# Patient Record
Sex: MALE | Race: WHITE | Employment: OTHER | URBAN - METROPOLITAN AREA
[De-identification: names, ages, dates, MRNs, and addresses within clinical notes are randomized per-mention and may not be internally consistent; named-entity substitution may affect disease eponyms.]

---

## 2019-09-03 ENCOUNTER — HOSPITAL ENCOUNTER (INPATIENT)
Age: 79
LOS: 3 days | Discharge: HOME | DRG: 292 | End: 2019-09-06
Attending: EMERGENCY MEDICINE | Admitting: INTERNAL MEDICINE
Payer: MEDICARE

## 2019-09-03 ENCOUNTER — APPOINTMENT (OUTPATIENT)
Dept: GENERAL RADIOLOGY | Age: 79
DRG: 292 | End: 2019-09-03
Payer: MEDICARE

## 2019-09-03 DIAGNOSIS — I50.9 ACUTE ON CHRONIC CONGESTIVE HEART FAILURE, UNSPECIFIED HEART FAILURE TYPE (HCC): Primary | ICD-10-CM

## 2019-09-03 DIAGNOSIS — E83.42 HYPOMAGNESEMIA: ICD-10-CM

## 2019-09-03 DIAGNOSIS — E87.6 HYPOKALEMIA: ICD-10-CM

## 2019-09-03 LAB
ALBUMIN SERPL-MCNC: 4.1 G/DL (ref 3.5–5.2)
ALP BLD-CCNC: 67 U/L (ref 40–129)
ALT SERPL-CCNC: 12 U/L (ref 0–40)
ANION GAP SERPL CALCULATED.3IONS-SCNC: 10 MMOL/L (ref 7–16)
AST SERPL-CCNC: 19 U/L (ref 0–39)
BASOPHILS ABSOLUTE: 0.01 E9/L (ref 0–0.2)
BASOPHILS RELATIVE PERCENT: 0.2 % (ref 0–2)
BILIRUB SERPL-MCNC: 0.9 MG/DL (ref 0–1.2)
BUN BLDV-MCNC: 11 MG/DL (ref 8–23)
CALCIUM SERPL-MCNC: 9 MG/DL (ref 8.6–10.2)
CHLORIDE BLD-SCNC: 104 MMOL/L (ref 98–107)
CO2: 33 MMOL/L (ref 22–29)
CREAT SERPL-MCNC: 1.1 MG/DL (ref 0.7–1.2)
EOSINOPHILS ABSOLUTE: 0.14 E9/L (ref 0.05–0.5)
EOSINOPHILS RELATIVE PERCENT: 2.9 % (ref 0–6)
GFR AFRICAN AMERICAN: >60
GFR NON-AFRICAN AMERICAN: >60 ML/MIN/1.73
GLUCOSE BLD-MCNC: 106 MG/DL (ref 74–99)
HCT VFR BLD CALC: 43 % (ref 37–54)
HEMOGLOBIN: 14.3 G/DL (ref 12.5–16.5)
IMMATURE GRANULOCYTES #: 0.01 E9/L
IMMATURE GRANULOCYTES %: 0.2 % (ref 0–5)
LACTIC ACID: 1 MMOL/L (ref 0.5–2.2)
LYMPHOCYTES ABSOLUTE: 0.95 E9/L (ref 1.5–4)
LYMPHOCYTES RELATIVE PERCENT: 19.4 % (ref 20–42)
MAGNESIUM: 1.5 MG/DL (ref 1.6–2.6)
MCH RBC QN AUTO: 33.1 PG (ref 26–35)
MCHC RBC AUTO-ENTMCNC: 33.3 % (ref 32–34.5)
MCV RBC AUTO: 99.5 FL (ref 80–99.9)
MONOCYTES ABSOLUTE: 0.47 E9/L (ref 0.1–0.95)
MONOCYTES RELATIVE PERCENT: 9.6 % (ref 2–12)
NEUTROPHILS ABSOLUTE: 3.32 E9/L (ref 1.8–7.3)
NEUTROPHILS RELATIVE PERCENT: 67.7 % (ref 43–80)
PDW BLD-RTO: 13.3 FL (ref 11.5–15)
PLATELET # BLD: 152 E9/L (ref 130–450)
PMV BLD AUTO: 11.4 FL (ref 7–12)
POTASSIUM SERPL-SCNC: 3.4 MMOL/L (ref 3.5–5)
PRO-BNP: 3344 PG/ML (ref 0–450)
RBC # BLD: 4.32 E12/L (ref 3.8–5.8)
SODIUM BLD-SCNC: 147 MMOL/L (ref 132–146)
TOTAL PROTEIN: 6.8 G/DL (ref 6.4–8.3)
TROPONIN: <0.01 NG/ML (ref 0–0.03)
WBC # BLD: 4.9 E9/L (ref 4.5–11.5)

## 2019-09-03 PROCEDURE — 2500000003 HC RX 250 WO HCPCS: Performed by: INTERNAL MEDICINE

## 2019-09-03 PROCEDURE — 80053 COMPREHEN METABOLIC PANEL: CPT

## 2019-09-03 PROCEDURE — 6370000000 HC RX 637 (ALT 250 FOR IP): Performed by: INTERNAL MEDICINE

## 2019-09-03 PROCEDURE — 85025 COMPLETE CBC W/AUTO DIFF WBC: CPT

## 2019-09-03 PROCEDURE — 6370000000 HC RX 637 (ALT 250 FOR IP): Performed by: EMERGENCY MEDICINE

## 2019-09-03 PROCEDURE — 6360000002 HC RX W HCPCS: Performed by: EMERGENCY MEDICINE

## 2019-09-03 PROCEDURE — 2580000003 HC RX 258: Performed by: INTERNAL MEDICINE

## 2019-09-03 PROCEDURE — 83605 ASSAY OF LACTIC ACID: CPT

## 2019-09-03 PROCEDURE — 84484 ASSAY OF TROPONIN QUANT: CPT

## 2019-09-03 PROCEDURE — 83735 ASSAY OF MAGNESIUM: CPT

## 2019-09-03 PROCEDURE — 93005 ELECTROCARDIOGRAM TRACING: CPT | Performed by: NURSE PRACTITIONER

## 2019-09-03 PROCEDURE — 36415 COLL VENOUS BLD VENIPUNCTURE: CPT

## 2019-09-03 PROCEDURE — 83880 ASSAY OF NATRIURETIC PEPTIDE: CPT

## 2019-09-03 PROCEDURE — 99285 EMERGENCY DEPT VISIT HI MDM: CPT

## 2019-09-03 PROCEDURE — 2060000000 HC ICU INTERMEDIATE R&B

## 2019-09-03 PROCEDURE — 71046 X-RAY EXAM CHEST 2 VIEWS: CPT

## 2019-09-03 PROCEDURE — 99223 1ST HOSP IP/OBS HIGH 75: CPT | Performed by: INTERNAL MEDICINE

## 2019-09-03 RX ORDER — SODIUM CHLORIDE 0.9 % (FLUSH) 0.9 %
10 SYRINGE (ML) INJECTION EVERY 12 HOURS SCHEDULED
Status: DISCONTINUED | OUTPATIENT
Start: 2019-09-03 | End: 2019-09-06 | Stop reason: HOSPADM

## 2019-09-03 RX ORDER — METOPROLOL SUCCINATE 100 MG/1
100 TABLET, EXTENDED RELEASE ORAL 2 TIMES DAILY
Status: DISCONTINUED | OUTPATIENT
Start: 2019-09-04 | End: 2019-09-06 | Stop reason: HOSPADM

## 2019-09-03 RX ORDER — LISINOPRIL 5 MG/1
5 TABLET ORAL DAILY
Status: DISCONTINUED | OUTPATIENT
Start: 2019-09-04 | End: 2019-09-04

## 2019-09-03 RX ORDER — DABIGATRAN ETEXILATE 75 MG/1
150 CAPSULE, COATED PELLETS ORAL 2 TIMES DAILY
Status: DISCONTINUED | OUTPATIENT
Start: 2019-09-03 | End: 2019-09-06 | Stop reason: HOSPADM

## 2019-09-03 RX ORDER — MAGNESIUM SULFATE IN WATER 40 MG/ML
2 INJECTION, SOLUTION INTRAVENOUS ONCE
Status: COMPLETED | OUTPATIENT
Start: 2019-09-03 | End: 2019-09-03

## 2019-09-03 RX ORDER — POTASSIUM CHLORIDE 20 MEQ/1
40 TABLET, EXTENDED RELEASE ORAL ONCE
Status: COMPLETED | OUTPATIENT
Start: 2019-09-03 | End: 2019-09-03

## 2019-09-03 RX ORDER — ONDANSETRON 2 MG/ML
4 INJECTION INTRAMUSCULAR; INTRAVENOUS EVERY 6 HOURS PRN
Status: DISCONTINUED | OUTPATIENT
Start: 2019-09-03 | End: 2019-09-06 | Stop reason: HOSPADM

## 2019-09-03 RX ORDER — SPIRONOLACTONE 25 MG/1
25 TABLET ORAL DAILY
Status: DISCONTINUED | OUTPATIENT
Start: 2019-09-04 | End: 2019-09-06 | Stop reason: HOSPADM

## 2019-09-03 RX ORDER — ACETAMINOPHEN 325 MG/1
650 TABLET ORAL EVERY 4 HOURS PRN
Status: DISCONTINUED | OUTPATIENT
Start: 2019-09-03 | End: 2019-09-06 | Stop reason: HOSPADM

## 2019-09-03 RX ORDER — SODIUM CHLORIDE 0.9 % (FLUSH) 0.9 %
10 SYRINGE (ML) INJECTION PRN
Status: DISCONTINUED | OUTPATIENT
Start: 2019-09-03 | End: 2019-09-06 | Stop reason: HOSPADM

## 2019-09-03 RX ORDER — LANOLIN ALCOHOL/MO/W.PET/CERES
1000 CREAM (GRAM) TOPICAL DAILY
Status: ON HOLD | COMMUNITY
End: 2022-10-14 | Stop reason: HOSPADM

## 2019-09-03 RX ORDER — FUROSEMIDE 10 MG/ML
20 INJECTION INTRAMUSCULAR; INTRAVENOUS 2 TIMES DAILY
Status: DISCONTINUED | OUTPATIENT
Start: 2019-09-04 | End: 2019-09-04

## 2019-09-03 RX ADMIN — POTASSIUM CHLORIDE 40 MEQ: 20 TABLET, EXTENDED RELEASE ORAL at 17:12

## 2019-09-03 RX ADMIN — Medication 10 ML: at 22:22

## 2019-09-03 RX ADMIN — DABIGATRAN ETEXILATE MESYLATE 150 MG: 75 CAPSULE ORAL at 22:51

## 2019-09-03 RX ADMIN — FAMOTIDINE 20 MG: 10 INJECTION, SOLUTION INTRAVENOUS at 22:50

## 2019-09-03 RX ADMIN — MAGNESIUM SULFATE HEPTAHYDRATE 2 G: 40 INJECTION, SOLUTION INTRAVENOUS at 19:28

## 2019-09-03 ASSESSMENT — PAIN SCALES - GENERAL: PAINLEVEL_OUTOF10: 0

## 2019-09-04 LAB
ANION GAP SERPL CALCULATED.3IONS-SCNC: 12 MMOL/L (ref 7–16)
BASOPHILS ABSOLUTE: 0.02 E9/L (ref 0–0.2)
BASOPHILS RELATIVE PERCENT: 0.3 % (ref 0–2)
BUN BLDV-MCNC: 12 MG/DL (ref 8–23)
CALCIUM SERPL-MCNC: 9.2 MG/DL (ref 8.6–10.2)
CHLORIDE BLD-SCNC: 103 MMOL/L (ref 98–107)
CHOLESTEROL, TOTAL: 147 MG/DL (ref 0–199)
CO2: 27 MMOL/L (ref 22–29)
CREAT SERPL-MCNC: 1 MG/DL (ref 0.7–1.2)
EKG ATRIAL RATE: 42 BPM
EKG Q-T INTERVAL: 476 MS
EKG QRS DURATION: 200 MS
EKG QTC CALCULATION (BAZETT): 559 MS
EKG R AXIS: -77 DEGREES
EKG T AXIS: 85 DEGREES
EKG VENTRICULAR RATE: 83 BPM
EOSINOPHILS ABSOLUTE: 0.21 E9/L (ref 0.05–0.5)
EOSINOPHILS RELATIVE PERCENT: 3.6 % (ref 0–6)
GFR AFRICAN AMERICAN: >60
GFR NON-AFRICAN AMERICAN: >60 ML/MIN/1.73
GLUCOSE BLD-MCNC: 121 MG/DL (ref 74–99)
HCT VFR BLD CALC: 46.4 % (ref 37–54)
HDLC SERPL-MCNC: 64 MG/DL
HEMOGLOBIN: 15.4 G/DL (ref 12.5–16.5)
IMMATURE GRANULOCYTES #: 0.01 E9/L
IMMATURE GRANULOCYTES %: 0.2 % (ref 0–5)
LDL CHOLESTEROL CALCULATED: 59 MG/DL (ref 0–99)
LV EF: 35 %
LVEF MODALITY: NORMAL
LYMPHOCYTES ABSOLUTE: 1.06 E9/L (ref 1.5–4)
LYMPHOCYTES RELATIVE PERCENT: 18.3 % (ref 20–42)
MAGNESIUM: 2 MG/DL (ref 1.6–2.6)
MCH RBC QN AUTO: 33.5 PG (ref 26–35)
MCHC RBC AUTO-ENTMCNC: 33.2 % (ref 32–34.5)
MCV RBC AUTO: 100.9 FL (ref 80–99.9)
MONOCYTES ABSOLUTE: 0.48 E9/L (ref 0.1–0.95)
MONOCYTES RELATIVE PERCENT: 8.3 % (ref 2–12)
NEUTROPHILS ABSOLUTE: 4.01 E9/L (ref 1.8–7.3)
NEUTROPHILS RELATIVE PERCENT: 69.3 % (ref 43–80)
PDW BLD-RTO: 13.5 FL (ref 11.5–15)
PLATELET # BLD: 147 E9/L (ref 130–450)
PMV BLD AUTO: 11 FL (ref 7–12)
POTASSIUM SERPL-SCNC: 3.6 MMOL/L (ref 3.5–5)
RBC # BLD: 4.6 E12/L (ref 3.8–5.8)
SODIUM BLD-SCNC: 142 MMOL/L (ref 132–146)
TRIGL SERPL-MCNC: 119 MG/DL (ref 0–149)
VLDLC SERPL CALC-MCNC: 24 MG/DL
WBC # BLD: 5.8 E9/L (ref 4.5–11.5)

## 2019-09-04 PROCEDURE — 36415 COLL VENOUS BLD VENIPUNCTURE: CPT

## 2019-09-04 PROCEDURE — 6370000000 HC RX 637 (ALT 250 FOR IP): Performed by: INTERNAL MEDICINE

## 2019-09-04 PROCEDURE — 93010 ELECTROCARDIOGRAM REPORT: CPT | Performed by: INTERNAL MEDICINE

## 2019-09-04 PROCEDURE — 2500000003 HC RX 250 WO HCPCS: Performed by: INTERNAL MEDICINE

## 2019-09-04 PROCEDURE — 6360000002 HC RX W HCPCS: Performed by: INTERNAL MEDICINE

## 2019-09-04 PROCEDURE — 2060000000 HC ICU INTERMEDIATE R&B

## 2019-09-04 PROCEDURE — 83735 ASSAY OF MAGNESIUM: CPT

## 2019-09-04 PROCEDURE — 80048 BASIC METABOLIC PNL TOTAL CA: CPT

## 2019-09-04 PROCEDURE — 85025 COMPLETE CBC W/AUTO DIFF WBC: CPT

## 2019-09-04 PROCEDURE — 80061 LIPID PANEL: CPT

## 2019-09-04 PROCEDURE — 99233 SBSQ HOSP IP/OBS HIGH 50: CPT | Performed by: INTERNAL MEDICINE

## 2019-09-04 PROCEDURE — 2580000003 HC RX 258: Performed by: INTERNAL MEDICINE

## 2019-09-04 PROCEDURE — 93306 TTE W/DOPPLER COMPLETE: CPT

## 2019-09-04 RX ORDER — LISINOPRIL 10 MG/1
10 TABLET ORAL DAILY
Status: DISCONTINUED | OUTPATIENT
Start: 2019-09-04 | End: 2019-09-05

## 2019-09-04 RX ORDER — FUROSEMIDE 10 MG/ML
40 INJECTION INTRAMUSCULAR; INTRAVENOUS 2 TIMES DAILY
Status: COMPLETED | OUTPATIENT
Start: 2019-09-04 | End: 2019-09-05

## 2019-09-04 RX ADMIN — DABIGATRAN ETEXILATE MESYLATE 150 MG: 75 CAPSULE ORAL at 09:12

## 2019-09-04 RX ADMIN — METOPROLOL SUCCINATE 100 MG: 100 TABLET, EXTENDED RELEASE ORAL at 16:35

## 2019-09-04 RX ADMIN — Medication 10 ML: at 21:28

## 2019-09-04 RX ADMIN — FAMOTIDINE 20 MG: 10 INJECTION, SOLUTION INTRAVENOUS at 09:20

## 2019-09-04 RX ADMIN — LISINOPRIL 10 MG: 10 TABLET ORAL at 09:11

## 2019-09-04 RX ADMIN — SPIRONOLACTONE 25 MG: 25 TABLET ORAL at 09:11

## 2019-09-04 RX ADMIN — DABIGATRAN ETEXILATE MESYLATE 150 MG: 75 CAPSULE ORAL at 21:28

## 2019-09-04 RX ADMIN — Medication 10 ML: at 09:13

## 2019-09-04 RX ADMIN — METOPROLOL SUCCINATE 100 MG: 100 TABLET, EXTENDED RELEASE ORAL at 09:11

## 2019-09-04 RX ADMIN — FUROSEMIDE 40 MG: 10 INJECTION, SOLUTION INTRAMUSCULAR; INTRAVENOUS at 09:12

## 2019-09-04 RX ADMIN — FUROSEMIDE 40 MG: 10 INJECTION, SOLUTION INTRAMUSCULAR; INTRAVENOUS at 18:11

## 2019-09-04 RX ADMIN — FAMOTIDINE 20 MG: 10 INJECTION, SOLUTION INTRAVENOUS at 21:28

## 2019-09-04 ASSESSMENT — PAIN SCALES - GENERAL: PAINLEVEL_OUTOF10: 0

## 2019-09-05 LAB
ANION GAP SERPL CALCULATED.3IONS-SCNC: 12 MMOL/L (ref 7–16)
BASOPHILS ABSOLUTE: 0.02 E9/L (ref 0–0.2)
BASOPHILS RELATIVE PERCENT: 0.3 % (ref 0–2)
BUN BLDV-MCNC: 15 MG/DL (ref 8–23)
CALCIUM SERPL-MCNC: 9.4 MG/DL (ref 8.6–10.2)
CHLORIDE BLD-SCNC: 103 MMOL/L (ref 98–107)
CO2: 30 MMOL/L (ref 22–29)
CREAT SERPL-MCNC: 1.2 MG/DL (ref 0.7–1.2)
EOSINOPHILS ABSOLUTE: 0.22 E9/L (ref 0.05–0.5)
EOSINOPHILS RELATIVE PERCENT: 3.5 % (ref 0–6)
GFR AFRICAN AMERICAN: >60
GFR NON-AFRICAN AMERICAN: 58 ML/MIN/1.73
GLUCOSE BLD-MCNC: 108 MG/DL (ref 74–99)
HCT VFR BLD CALC: 43.7 % (ref 37–54)
HEMOGLOBIN: 14.7 G/DL (ref 12.5–16.5)
IMMATURE GRANULOCYTES #: 0.02 E9/L
IMMATURE GRANULOCYTES %: 0.3 % (ref 0–5)
LYMPHOCYTES ABSOLUTE: 1.38 E9/L (ref 1.5–4)
LYMPHOCYTES RELATIVE PERCENT: 22.2 % (ref 20–42)
MAGNESIUM: 1.8 MG/DL (ref 1.6–2.6)
MCH RBC QN AUTO: 33.3 PG (ref 26–35)
MCHC RBC AUTO-ENTMCNC: 33.6 % (ref 32–34.5)
MCV RBC AUTO: 98.9 FL (ref 80–99.9)
MONOCYTES ABSOLUTE: 0.69 E9/L (ref 0.1–0.95)
MONOCYTES RELATIVE PERCENT: 11.1 % (ref 2–12)
NEUTROPHILS ABSOLUTE: 3.9 E9/L (ref 1.8–7.3)
NEUTROPHILS RELATIVE PERCENT: 62.6 % (ref 43–80)
PDW BLD-RTO: 13.3 FL (ref 11.5–15)
PLATELET # BLD: 147 E9/L (ref 130–450)
PMV BLD AUTO: 11 FL (ref 7–12)
POTASSIUM SERPL-SCNC: 3.5 MMOL/L (ref 3.5–5)
PRO-BNP: 2618 PG/ML (ref 0–450)
RBC # BLD: 4.42 E12/L (ref 3.8–5.8)
SODIUM BLD-SCNC: 145 MMOL/L (ref 132–146)
WBC # BLD: 6.2 E9/L (ref 4.5–11.5)

## 2019-09-05 PROCEDURE — 6370000000 HC RX 637 (ALT 250 FOR IP): Performed by: INTERNAL MEDICINE

## 2019-09-05 PROCEDURE — 80048 BASIC METABOLIC PNL TOTAL CA: CPT

## 2019-09-05 PROCEDURE — 36415 COLL VENOUS BLD VENIPUNCTURE: CPT

## 2019-09-05 PROCEDURE — 2580000003 HC RX 258: Performed by: INTERNAL MEDICINE

## 2019-09-05 PROCEDURE — 83735 ASSAY OF MAGNESIUM: CPT

## 2019-09-05 PROCEDURE — 99232 SBSQ HOSP IP/OBS MODERATE 35: CPT | Performed by: INTERNAL MEDICINE

## 2019-09-05 PROCEDURE — 6360000002 HC RX W HCPCS: Performed by: INTERNAL MEDICINE

## 2019-09-05 PROCEDURE — 2500000003 HC RX 250 WO HCPCS: Performed by: INTERNAL MEDICINE

## 2019-09-05 PROCEDURE — 85025 COMPLETE CBC W/AUTO DIFF WBC: CPT

## 2019-09-05 PROCEDURE — 2060000000 HC ICU INTERMEDIATE R&B

## 2019-09-05 PROCEDURE — 83880 ASSAY OF NATRIURETIC PEPTIDE: CPT

## 2019-09-05 PROCEDURE — 2580000003 HC RX 258: Performed by: EMERGENCY MEDICINE

## 2019-09-05 RX ORDER — LISINOPRIL 20 MG/1
20 TABLET ORAL DAILY
Status: DISCONTINUED | OUTPATIENT
Start: 2019-09-05 | End: 2019-09-06 | Stop reason: HOSPADM

## 2019-09-05 RX ORDER — POTASSIUM CHLORIDE 20 MEQ/1
20 TABLET, EXTENDED RELEASE ORAL ONCE
Status: COMPLETED | OUTPATIENT
Start: 2019-09-05 | End: 2019-09-05

## 2019-09-05 RX ORDER — METOLAZONE 5 MG/1
5 TABLET ORAL ONCE
Status: COMPLETED | OUTPATIENT
Start: 2019-09-05 | End: 2019-09-05

## 2019-09-05 RX ORDER — FUROSEMIDE 40 MG/1
40 TABLET ORAL DAILY
Status: DISCONTINUED | OUTPATIENT
Start: 2019-09-06 | End: 2019-09-06 | Stop reason: HOSPADM

## 2019-09-05 RX ADMIN — Medication 10 ML: at 20:17

## 2019-09-05 RX ADMIN — SPIRONOLACTONE 25 MG: 25 TABLET ORAL at 08:56

## 2019-09-05 RX ADMIN — Medication 10 ML: at 08:57

## 2019-09-05 RX ADMIN — DABIGATRAN ETEXILATE MESYLATE 150 MG: 75 CAPSULE ORAL at 20:30

## 2019-09-05 RX ADMIN — POTASSIUM CHLORIDE 20 MEQ: 20 TABLET, EXTENDED RELEASE ORAL at 08:56

## 2019-09-05 RX ADMIN — LISINOPRIL 20 MG: 20 TABLET ORAL at 08:56

## 2019-09-05 RX ADMIN — POTASSIUM CHLORIDE 20 MEQ: 20 TABLET, EXTENDED RELEASE ORAL at 12:38

## 2019-09-05 RX ADMIN — METOPROLOL SUCCINATE 100 MG: 100 TABLET, EXTENDED RELEASE ORAL at 08:56

## 2019-09-05 RX ADMIN — DABIGATRAN ETEXILATE MESYLATE 150 MG: 75 CAPSULE ORAL at 08:56

## 2019-09-05 RX ADMIN — FAMOTIDINE 20 MG: 10 INJECTION, SOLUTION INTRAVENOUS at 20:16

## 2019-09-05 RX ADMIN — METOLAZONE 5 MG: 5 TABLET ORAL at 12:38

## 2019-09-05 RX ADMIN — FUROSEMIDE 40 MG: 10 INJECTION, SOLUTION INTRAMUSCULAR; INTRAVENOUS at 08:56

## 2019-09-05 RX ADMIN — FAMOTIDINE 20 MG: 10 INJECTION, SOLUTION INTRAVENOUS at 08:56

## 2019-09-05 RX ADMIN — FUROSEMIDE 40 MG: 10 INJECTION, SOLUTION INTRAMUSCULAR; INTRAVENOUS at 17:31

## 2019-09-05 RX ADMIN — Medication 10 ML: at 21:30

## 2019-09-05 RX ADMIN — METOPROLOL SUCCINATE 100 MG: 100 TABLET, EXTENDED RELEASE ORAL at 17:31

## 2019-09-05 ASSESSMENT — PAIN SCALES - GENERAL: PAINLEVEL_OUTOF10: 0

## 2019-09-06 VITALS
HEART RATE: 70 BPM | WEIGHT: 249.6 LBS | SYSTOLIC BLOOD PRESSURE: 138 MMHG | DIASTOLIC BLOOD PRESSURE: 80 MMHG | BODY MASS INDEX: 35.73 KG/M2 | OXYGEN SATURATION: 94 % | HEIGHT: 70 IN | TEMPERATURE: 98.2 F | RESPIRATION RATE: 18 BRPM

## 2019-09-06 LAB
ANION GAP SERPL CALCULATED.3IONS-SCNC: 11 MMOL/L (ref 7–16)
BASOPHILS ABSOLUTE: 0.03 E9/L (ref 0–0.2)
BASOPHILS RELATIVE PERCENT: 0.4 % (ref 0–2)
BUN BLDV-MCNC: 19 MG/DL (ref 8–23)
CALCIUM SERPL-MCNC: 9.2 MG/DL (ref 8.6–10.2)
CHLORIDE BLD-SCNC: 96 MMOL/L (ref 98–107)
CO2: 31 MMOL/L (ref 22–29)
CREAT SERPL-MCNC: 1.5 MG/DL (ref 0.7–1.2)
EOSINOPHILS ABSOLUTE: 0.28 E9/L (ref 0.05–0.5)
EOSINOPHILS RELATIVE PERCENT: 4 % (ref 0–6)
GFR AFRICAN AMERICAN: 55
GFR NON-AFRICAN AMERICAN: 45 ML/MIN/1.73
GLUCOSE BLD-MCNC: 119 MG/DL (ref 74–99)
HCT VFR BLD CALC: 43.9 % (ref 37–54)
HEMOGLOBIN: 14.7 G/DL (ref 12.5–16.5)
IMMATURE GRANULOCYTES #: 0.02 E9/L
IMMATURE GRANULOCYTES %: 0.3 % (ref 0–5)
LYMPHOCYTES ABSOLUTE: 1.2 E9/L (ref 1.5–4)
LYMPHOCYTES RELATIVE PERCENT: 17.2 % (ref 20–42)
MAGNESIUM: 1.7 MG/DL (ref 1.6–2.6)
MCH RBC QN AUTO: 33.5 PG (ref 26–35)
MCHC RBC AUTO-ENTMCNC: 33.5 % (ref 32–34.5)
MCV RBC AUTO: 100 FL (ref 80–99.9)
MONOCYTES ABSOLUTE: 0.68 E9/L (ref 0.1–0.95)
MONOCYTES RELATIVE PERCENT: 9.8 % (ref 2–12)
NEUTROPHILS ABSOLUTE: 4.75 E9/L (ref 1.8–7.3)
NEUTROPHILS RELATIVE PERCENT: 68.3 % (ref 43–80)
PDW BLD-RTO: 13.3 FL (ref 11.5–15)
PLATELET # BLD: 152 E9/L (ref 130–450)
PMV BLD AUTO: 11.2 FL (ref 7–12)
POTASSIUM SERPL-SCNC: 3.6 MMOL/L (ref 3.5–5)
RBC # BLD: 4.39 E12/L (ref 3.8–5.8)
SODIUM BLD-SCNC: 138 MMOL/L (ref 132–146)
WBC # BLD: 7 E9/L (ref 4.5–11.5)

## 2019-09-06 PROCEDURE — 36415 COLL VENOUS BLD VENIPUNCTURE: CPT

## 2019-09-06 PROCEDURE — 83735 ASSAY OF MAGNESIUM: CPT

## 2019-09-06 PROCEDURE — 2580000003 HC RX 258: Performed by: INTERNAL MEDICINE

## 2019-09-06 PROCEDURE — 2500000003 HC RX 250 WO HCPCS: Performed by: INTERNAL MEDICINE

## 2019-09-06 PROCEDURE — 80048 BASIC METABOLIC PNL TOTAL CA: CPT

## 2019-09-06 PROCEDURE — 6370000000 HC RX 637 (ALT 250 FOR IP): Performed by: INTERNAL MEDICINE

## 2019-09-06 PROCEDURE — 2580000003 HC RX 258: Performed by: EMERGENCY MEDICINE

## 2019-09-06 PROCEDURE — 85025 COMPLETE CBC W/AUTO DIFF WBC: CPT

## 2019-09-06 PROCEDURE — 99239 HOSP IP/OBS DSCHRG MGMT >30: CPT | Performed by: INTERNAL MEDICINE

## 2019-09-06 RX ORDER — LISINOPRIL 20 MG/1
20 TABLET ORAL DAILY
Qty: 30 TABLET | Refills: 0 | Status: ON HOLD | OUTPATIENT
Start: 2019-09-07 | End: 2022-10-14 | Stop reason: HOSPADM

## 2019-09-06 RX ADMIN — Medication 10 ML: at 08:16

## 2019-09-06 RX ADMIN — DABIGATRAN ETEXILATE MESYLATE 150 MG: 75 CAPSULE ORAL at 08:14

## 2019-09-06 RX ADMIN — LISINOPRIL 20 MG: 20 TABLET ORAL at 08:15

## 2019-09-06 RX ADMIN — SPIRONOLACTONE 25 MG: 25 TABLET ORAL at 08:15

## 2019-09-06 RX ADMIN — METOPROLOL SUCCINATE 100 MG: 100 TABLET, EXTENDED RELEASE ORAL at 08:15

## 2019-09-06 RX ADMIN — FUROSEMIDE 40 MG: 40 TABLET ORAL at 08:15

## 2019-09-06 RX ADMIN — FAMOTIDINE 20 MG: 10 INJECTION, SOLUTION INTRAVENOUS at 08:14

## 2019-09-06 ASSESSMENT — PAIN SCALES - GENERAL
PAINLEVEL_OUTOF10: 0

## 2019-09-07 ENCOUNTER — CARE COORDINATION (OUTPATIENT)
Dept: CASE MANAGEMENT | Age: 79
End: 2019-09-07

## 2019-09-16 ENCOUNTER — CARE COORDINATION (OUTPATIENT)
Dept: CASE MANAGEMENT | Age: 79
End: 2019-09-16

## 2019-09-16 NOTE — CARE COORDINATION
[facility] Transitions Follow Up Call    2019    Patient: Edna Mancini  Patient : 1940   MRN: <[id number]>  Reason for Admission  Discharge Date: 19 RARS: Readmission Risk Score: 12         Spoke with: Judi Harris Transitions Subsequent and Final Call    Subsequent and Final Calls  Have your medications changed?:  No  Do you have any questions related to your medications?:  No  Do you currently have any active services?:  No  Do you have any needs or concerns that I can assist you with?:  No  Identified Barriers:  None  Care Transitions Interventions  Other Interventions: Follow Up : Spoke with Sharyle Labor who said he is doing well. Weight is down to #246 from #253 on 19. Pt said he is actually able to sleep in bed now and no longer has to sleep in the recliner. Pt said he sits in the recliner during the day when watching TV and does prop his legs up anytime he is sitting up. Pt said his breathing is good and during our call he did not sound short of breath and was able to complete full sentences. Pt said he does not have any swelling right now but did notice a little dent in his ankles from his socks and said he is calling his doctor to ask about taking an extra lasix. Pt said he is working on adjusting to the low sodium diet, said he is trying but it is hard. I encouraged patient to take it in baby steps and eventually he will get there. Pt was very appreciative of the call today Will continue to follow   No future appointments.     Sheeba Sharma RN

## 2019-09-26 ENCOUNTER — CARE COORDINATION (OUTPATIENT)
Dept: CASE MANAGEMENT | Age: 79
End: 2019-09-26

## 2019-10-10 ENCOUNTER — CARE COORDINATION (OUTPATIENT)
Dept: CASE MANAGEMENT | Age: 79
End: 2019-10-10

## 2019-10-17 ENCOUNTER — CARE COORDINATION (OUTPATIENT)
Dept: CASE MANAGEMENT | Age: 79
End: 2019-10-17

## 2019-11-01 ENCOUNTER — CARE COORDINATION (OUTPATIENT)
Dept: CASE MANAGEMENT | Age: 79
End: 2019-11-01

## 2019-11-22 ENCOUNTER — CARE COORDINATION (OUTPATIENT)
Dept: CASE MANAGEMENT | Age: 79
End: 2019-11-22

## 2019-11-29 ENCOUNTER — CARE COORDINATION (OUTPATIENT)
Dept: CASE MANAGEMENT | Age: 79
End: 2019-11-29

## 2019-12-05 ENCOUNTER — CARE COORDINATION (OUTPATIENT)
Dept: CASE MANAGEMENT | Age: 79
End: 2019-12-05

## 2020-03-10 ENCOUNTER — HOSPITAL ENCOUNTER (OUTPATIENT)
Age: 80
Setting detail: OBSERVATION
Discharge: HOME | End: 2020-03-11
Attending: EMERGENCY MEDICINE | Admitting: INTERNAL MEDICINE
Payer: MEDICARE

## 2020-03-10 ENCOUNTER — APPOINTMENT (OUTPATIENT)
Dept: GENERAL RADIOLOGY | Age: 80
End: 2020-03-10
Payer: MEDICARE

## 2020-03-10 PROBLEM — I50.43 CHF (CONGESTIVE HEART FAILURE), NYHA CLASS I, ACUTE ON CHRONIC, COMBINED (HCC): Status: ACTIVE | Noted: 2020-03-10

## 2020-03-10 LAB
ALBUMIN SERPL-MCNC: 3.9 G/DL (ref 3.5–5.2)
ALP BLD-CCNC: 89 U/L (ref 40–129)
ALT SERPL-CCNC: 13 U/L (ref 0–40)
ANION GAP SERPL CALCULATED.3IONS-SCNC: 11 MMOL/L (ref 7–16)
APTT: 34.4 SEC (ref 24.5–35.1)
AST SERPL-CCNC: 16 U/L (ref 0–39)
BASOPHILS ABSOLUTE: 0.01 E9/L (ref 0–0.2)
BASOPHILS RELATIVE PERCENT: 0.2 % (ref 0–2)
BILIRUB SERPL-MCNC: 0.6 MG/DL (ref 0–1.2)
BUN BLDV-MCNC: 18 MG/DL (ref 8–23)
CALCIUM SERPL-MCNC: 8.7 MG/DL (ref 8.6–10.2)
CHLORIDE BLD-SCNC: 102 MMOL/L (ref 98–107)
CO2: 30 MMOL/L (ref 22–29)
CREAT SERPL-MCNC: 1.2 MG/DL (ref 0.7–1.2)
EKG ATRIAL RATE: 55 BPM
EKG Q-T INTERVAL: 512 MS
EKG QRS DURATION: 194 MS
EKG QTC CALCULATION (BAZETT): 552 MS
EKG R AXIS: -67 DEGREES
EKG T AXIS: 93 DEGREES
EKG VENTRICULAR RATE: 70 BPM
EOSINOPHILS ABSOLUTE: 0.15 E9/L (ref 0.05–0.5)
EOSINOPHILS RELATIVE PERCENT: 3.1 % (ref 0–6)
GFR AFRICAN AMERICAN: >60
GFR NON-AFRICAN AMERICAN: 58 ML/MIN/1.73
GLUCOSE BLD-MCNC: 103 MG/DL (ref 74–99)
HCT VFR BLD CALC: 43.4 % (ref 37–54)
HEMOGLOBIN: 14 G/DL (ref 12.5–16.5)
IMMATURE GRANULOCYTES #: 0.02 E9/L
IMMATURE GRANULOCYTES %: 0.4 % (ref 0–5)
INR BLD: 1
LIPASE: 47 U/L (ref 13–60)
LYMPHOCYTES ABSOLUTE: 1.24 E9/L (ref 1.5–4)
LYMPHOCYTES RELATIVE PERCENT: 25.4 % (ref 20–42)
MCH RBC QN AUTO: 31.7 PG (ref 26–35)
MCHC RBC AUTO-ENTMCNC: 32.3 % (ref 32–34.5)
MCV RBC AUTO: 98.2 FL (ref 80–99.9)
MONOCYTES ABSOLUTE: 0.39 E9/L (ref 0.1–0.95)
MONOCYTES RELATIVE PERCENT: 8 % (ref 2–12)
NEUTROPHILS ABSOLUTE: 3.07 E9/L (ref 1.8–7.3)
NEUTROPHILS RELATIVE PERCENT: 62.9 % (ref 43–80)
PDW BLD-RTO: 13.5 FL (ref 11.5–15)
PLATELET # BLD: 135 E9/L (ref 130–450)
PMV BLD AUTO: 11.3 FL (ref 7–12)
POTASSIUM REFLEX MAGNESIUM: 3.8 MMOL/L (ref 3.5–5)
PRO-BNP: 1771 PG/ML (ref 0–450)
PROTHROMBIN TIME: 12.4 SEC (ref 9.3–12.4)
RBC # BLD: 4.42 E12/L (ref 3.8–5.8)
SODIUM BLD-SCNC: 143 MMOL/L (ref 132–146)
TOTAL PROTEIN: 6.5 G/DL (ref 6.4–8.3)
TROPONIN: <0.01 NG/ML (ref 0–0.03)
WBC # BLD: 4.9 E9/L (ref 4.5–11.5)

## 2020-03-10 PROCEDURE — 85730 THROMBOPLASTIN TIME PARTIAL: CPT

## 2020-03-10 PROCEDURE — 93010 ELECTROCARDIOGRAM REPORT: CPT | Performed by: INTERNAL MEDICINE

## 2020-03-10 PROCEDURE — 6360000002 HC RX W HCPCS: Performed by: EMERGENCY MEDICINE

## 2020-03-10 PROCEDURE — 71045 X-RAY EXAM CHEST 1 VIEW: CPT

## 2020-03-10 PROCEDURE — 80053 COMPREHEN METABOLIC PANEL: CPT

## 2020-03-10 PROCEDURE — 93005 ELECTROCARDIOGRAM TRACING: CPT | Performed by: EMERGENCY MEDICINE

## 2020-03-10 PROCEDURE — 84484 ASSAY OF TROPONIN QUANT: CPT

## 2020-03-10 PROCEDURE — 83690 ASSAY OF LIPASE: CPT

## 2020-03-10 PROCEDURE — 96375 TX/PRO/DX INJ NEW DRUG ADDON: CPT

## 2020-03-10 PROCEDURE — G0378 HOSPITAL OBSERVATION PER HR: HCPCS

## 2020-03-10 PROCEDURE — 85025 COMPLETE CBC W/AUTO DIFF WBC: CPT

## 2020-03-10 PROCEDURE — 85610 PROTHROMBIN TIME: CPT

## 2020-03-10 PROCEDURE — 99285 EMERGENCY DEPT VISIT HI MDM: CPT

## 2020-03-10 PROCEDURE — 99219 PR INITIAL OBSERVATION CARE/DAY 50 MINUTES: CPT | Performed by: INTERNAL MEDICINE

## 2020-03-10 PROCEDURE — 96374 THER/PROPH/DIAG INJ IV PUSH: CPT

## 2020-03-10 PROCEDURE — 2500000003 HC RX 250 WO HCPCS: Performed by: INTERNAL MEDICINE

## 2020-03-10 PROCEDURE — 83880 ASSAY OF NATRIURETIC PEPTIDE: CPT

## 2020-03-10 PROCEDURE — 96376 TX/PRO/DX INJ SAME DRUG ADON: CPT

## 2020-03-10 PROCEDURE — 6370000000 HC RX 637 (ALT 250 FOR IP): Performed by: INTERNAL MEDICINE

## 2020-03-10 RX ORDER — ACETAMINOPHEN 325 MG/1
650 TABLET ORAL EVERY 6 HOURS PRN
Status: DISCONTINUED | OUTPATIENT
Start: 2020-03-10 | End: 2020-03-11 | Stop reason: HOSPADM

## 2020-03-10 RX ORDER — SODIUM CHLORIDE 0.9 % (FLUSH) 0.9 %
10 SYRINGE (ML) INJECTION PRN
Status: DISCONTINUED | OUTPATIENT
Start: 2020-03-10 | End: 2020-03-11 | Stop reason: HOSPADM

## 2020-03-10 RX ORDER — FUROSEMIDE 10 MG/ML
40 INJECTION INTRAMUSCULAR; INTRAVENOUS ONCE
Status: COMPLETED | OUTPATIENT
Start: 2020-03-10 | End: 2020-03-10

## 2020-03-10 RX ORDER — SPIRONOLACTONE 25 MG/1
25 TABLET ORAL DAILY
Status: DISCONTINUED | OUTPATIENT
Start: 2020-03-10 | End: 2020-03-11 | Stop reason: HOSPADM

## 2020-03-10 RX ORDER — LISINOPRIL 20 MG/1
20 TABLET ORAL DAILY
Status: DISCONTINUED | OUTPATIENT
Start: 2020-03-10 | End: 2020-03-11 | Stop reason: HOSPADM

## 2020-03-10 RX ORDER — ACETAMINOPHEN 650 MG/1
650 SUPPOSITORY RECTAL EVERY 6 HOURS PRN
Status: DISCONTINUED | OUTPATIENT
Start: 2020-03-10 | End: 2020-03-11 | Stop reason: HOSPADM

## 2020-03-10 RX ORDER — LANOLIN ALCOHOL/MO/W.PET/CERES
1000 CREAM (GRAM) TOPICAL DAILY
Status: DISCONTINUED | OUTPATIENT
Start: 2020-03-10 | End: 2020-03-11 | Stop reason: HOSPADM

## 2020-03-10 RX ORDER — ONDANSETRON 2 MG/ML
4 INJECTION INTRAMUSCULAR; INTRAVENOUS EVERY 6 HOURS PRN
Status: DISCONTINUED | OUTPATIENT
Start: 2020-03-10 | End: 2020-03-11 | Stop reason: HOSPADM

## 2020-03-10 RX ORDER — CETIRIZINE HYDROCHLORIDE 10 MG/1
10 TABLET ORAL DAILY
Status: DISCONTINUED | OUTPATIENT
Start: 2020-03-10 | End: 2020-03-11 | Stop reason: HOSPADM

## 2020-03-10 RX ORDER — METOPROLOL SUCCINATE 100 MG/1
100 TABLET, EXTENDED RELEASE ORAL DAILY
Status: DISCONTINUED | OUTPATIENT
Start: 2020-03-10 | End: 2020-03-11 | Stop reason: HOSPADM

## 2020-03-10 RX ORDER — POLYETHYLENE GLYCOL 3350 17 G/17G
17 POWDER, FOR SOLUTION ORAL DAILY PRN
Status: DISCONTINUED | OUTPATIENT
Start: 2020-03-10 | End: 2020-03-11 | Stop reason: HOSPADM

## 2020-03-10 RX ORDER — PROMETHAZINE HYDROCHLORIDE 25 MG/1
12.5 TABLET ORAL EVERY 6 HOURS PRN
Status: DISCONTINUED | OUTPATIENT
Start: 2020-03-10 | End: 2020-03-11 | Stop reason: HOSPADM

## 2020-03-10 RX ORDER — SODIUM CHLORIDE 0.9 % (FLUSH) 0.9 %
10 SYRINGE (ML) INJECTION EVERY 12 HOURS SCHEDULED
Status: DISCONTINUED | OUTPATIENT
Start: 2020-03-10 | End: 2020-03-11 | Stop reason: HOSPADM

## 2020-03-10 RX ORDER — BUMETANIDE 0.25 MG/ML
1 INJECTION, SOLUTION INTRAMUSCULAR; INTRAVENOUS 2 TIMES DAILY
Status: DISCONTINUED | OUTPATIENT
Start: 2020-03-10 | End: 2020-03-11 | Stop reason: HOSPADM

## 2020-03-10 RX ADMIN — CETIRIZINE HYDROCHLORIDE 10 MG: 10 TABLET, FILM COATED ORAL at 08:05

## 2020-03-10 RX ADMIN — METOPROLOL SUCCINATE 100 MG: 100 TABLET, EXTENDED RELEASE ORAL at 08:05

## 2020-03-10 RX ADMIN — APIXABAN 5 MG: 5 TABLET, FILM COATED ORAL at 08:05

## 2020-03-10 RX ADMIN — BUMETANIDE 1 MG: 0.25 INJECTION INTRAMUSCULAR; INTRAVENOUS at 08:05

## 2020-03-10 RX ADMIN — BUMETANIDE 1 MG: 0.25 INJECTION INTRAMUSCULAR; INTRAVENOUS at 21:30

## 2020-03-10 RX ADMIN — LISINOPRIL 20 MG: 20 TABLET ORAL at 08:05

## 2020-03-10 RX ADMIN — CYANOCOBALAMIN TAB 1000 MCG 1000 MCG: 1000 TAB at 08:08

## 2020-03-10 RX ADMIN — APIXABAN 5 MG: 5 TABLET, FILM COATED ORAL at 21:30

## 2020-03-10 RX ADMIN — FUROSEMIDE 40 MG: 10 INJECTION, SOLUTION INTRAMUSCULAR; INTRAVENOUS at 02:47

## 2020-03-10 RX ADMIN — SPIRONOLACTONE 25 MG: 25 TABLET ORAL at 08:05

## 2020-03-10 ASSESSMENT — PAIN SCALES - GENERAL
PAINLEVEL_OUTOF10: 0

## 2020-03-10 ASSESSMENT — ENCOUNTER SYMPTOMS
SORE THROAT: 0
GASTROINTESTINAL NEGATIVE: 1
VOMITING: 0
SPUTUM PRODUCTION: 0
COUGH: 0
SWOLLEN GLANDS: 0
SHORTNESS OF BREATH: 1
HEMOPTYSIS: 0
ABDOMINAL PAIN: 0
EYES NEGATIVE: 1

## 2020-03-10 NOTE — H&P
4. 9   RBC 4.42   HGB 14.0   HCT 43.4   MCV 98.2   MCH 31.7   MCHC 32.3   RDW 13.5      MPV 11.3       No results for input(s): POCGLU in the last 72 hours. BNP 1771    Radiology:   XR CHEST PORTABLE   Final Result   Mild CHF without edema. EKG: Ventricular paced rhythm    ASSESSMENT:      Active Problems:    CHF (congestive heart failure), NYHA class I, acute on chronic, combined ([address]  Resolved Problems:    * No resolved hospital problems. *      PLAN:    1. Acute on chronic CHF Exacerbation - Diurese with IV Bumex 1mg BID. Continue home ACEI Lisinopril 20mg, Toprol 100mg daily, Spironolactone 25mg daily. Daily weights and I/Os. Cardiology consulted, NPO until cardiology sees    2. Hx of Afib with ICD - Currently rate controlled with pacer. Continue eliquis 5 mg BID for anticoagulation. Cardiology consulted. Code Status: Full  DVT prophylaxis: Eliquis 5mg BID      NOTE: This report was transcribed using voice recognition software. Every effort was made to ensure accuracy; however, inadvertent computerized transcription errors may be present.   Electronically signed by Marianna Babcock MD on 3/10/2020 at 9:27 AM

## 2020-03-10 NOTE — CONSULTS
The [address]    Name: Amarilis Ibanez    Age: [de-identified] y.o. Date of Admission: 3/10/2020  1:57 AM    Date of Service: 3/10/2020    Reason for Consultation: Shortness of breath, weight gain    Referring Physician:     History of Present Illness: The patient is a [de-identified]y.o. year old male with known history of chronic CAD with prior angioplasty, Medtronic permanent pacemaker implanted initially December 2007 and generator change April 2014 and has had AV coy ablation, chronic Eliquis 5 mg twice a day for atrial fibrillation. Echocardiogram September 4, 2019 LVEF 35%, moderate mitral valve regurgitation and mild tricuspid valve regurgitation. He reports over the 3 days prior to admission some progressive shortness of breath and weight gain up to 10 pounds by his report. He denies any clear-cut orthopnea, unstable angina or syncope. He does try to keep somewhat active walking in grocery shopping but has noticed some increased shortness of breath when bending over to tie shoes. Past Medical History: As above and also history of obstructive sleep apnea but prefers not to wear CPAP or BiPAP. Past surgical history prior bilateral shoulder surgery, partial finger amputation. Review of Systems:     Constitutional: No fever, chills, sweats  Cardiac: As per HPI  Pulmonary: No cough, wheeze, hemoptysis  HEENT: No visual disturbances or difficult swallowing  GI: No nausea, vomiting, diarrhea, abdominal pain, rectal bleeding  : No dysuria or hematuria  Endocrine: No excessive thirst, heat or cold intolerance. Musculoskeletal: No joint pain or muscle aches. No claudication  Skin: No skin breakdown or rashes  Neuro: No headache, confusion, or seizures  Psych: No depression, anxiety      Family History:  No family history on file.     Social History:  Social History     Socioeconomic History    Marital status:      Spouse name: Not on file    Number of children: Not on file    Years of education: Not on file    Highest education level: Not on file   Occupational History    Not on file   Social Needs    Financial resource strain: Not on file    Food insecurity     Worry: Not on file     Inability: Not on file    Transportation needs     Medical: Not on file     Non-medical: Not on file   Tobacco Use    Smoking status: Former Smoker    Smokeless tobacco: Never Used   Substance and Sexual Activity    Alcohol use: Yes     Alcohol/week: 14.0 standard drinks     Types: 14 Shots of liquor per week    Drug use: Not on file    Sexual activity: Not on file   Lifestyle    Physical activity     Days per week: Not on file     Minutes per session: Not on file    Stress: Not on file   Relationships    Social connections     Talks on phone: Not on file     Gets together: Not on file     Attends Rastafari service: Not on file     Active member of club or organization: Not on file     Attends meetings of clubs or organizations: Not on file     Relationship status: Not on file    Intimate partner violence     Fear of current or ex partner: Not on file     Emotionally abused: Not on file     Physically abused: Not on file     Forced sexual activity: Not on file   Other Topics Concern    Not on file   Social History Narrative    Not on file       Allergies:   Allergies   Allergen Reactions    Coumadin [Warfarin Sodium] Other (See Comments)     Excessive bleeding requiring transfusions      Heparin Other (See Comments)     Excessive bleeding requiring transfusions       Current Medications:  Current Facility-Administered Medications   Medication Dose Route Frequency Provider Last Rate Last Dose    apixaban (ELIQUIS) tablet 5 mg  5 mg Oral BID Mimi Perez MD   5 mg at 03/10/20 0805    cetirizine (ZYRTEC) tablet 10 mg  10 mg Oral Daily Mimi Perez MD   10 mg at 03/10/20 0805    lisinopril (PRINIVIL;ZESTRIL) tablet 20 mg  20 mg Oral Daily Mimi Perez MD   20 mg at 03/10/20 5437    metoprolol succinate (TOPROL XL) extended release tablet 100 mg  100 mg Oral Daily Mimi Perez MD   100 mg at 03/10/20 0805    spironolactone (ALDACTONE) tablet 25 mg  25 mg Oral Daily Mimi Perez MD   25 mg at 03/10/20 0805    vitamin B-12 (CYANOCOBALAMIN) tablet 1,000 mcg  1,000 mcg Oral Daily Mimi Perez MD   1,000 mcg at 03/10/20 3508    bumetanide (BUMEX) injection 1 mg  1 mg Intravenous BID Mimi Perez MD   1 mg at 03/10/20 0805         Physical Exam:  /78   Pulse 70   Temp 98 °F (36.7 °C) (Oral)   Resp 18   Ht 5' 11\" (1.803 m)   Wt 260 lb 3.2 oz (118 kg)   SpO2 92%   BMI 36.29 kg/m²   Weight change: Wt Readings from Last 3 Encounters:   03/10/20 260 lb 3.2 oz (118 kg)   09/06/19 249 lb 9.6 oz (113.2 kg)         General: Awake, alert, oriented x3, no acute distress  HEENT: Unremarkable  Neck: No JVD or bruits. Cardiac: Regular rate and rhythm, normal S1 and S2, no extra heart sounds, murmurs, heaves, thrills  Resp: Lungs clear without wheezing or crackles. No accessory muscle use or retraction  Abdomen: soft, nontender, nondistended, no gross organomegaly or mass  Skin: Warm and dry, no cyanosis. Musculoskeletal: normal tone and strength in the upper and lower extremities bilaterally  Neuro: Grossly unremarkable  Psych: Cooperative, and normal affect  Permanent pacemaker in the left subclavicular region intact. Intake/Output:  No intake or output data in the 24 hours ending 03/10/20 0930  No intake/output data recorded. Laboratory Tests:  Lab Results   Component Value Date    CREATININE 1.2 03/10/2020    BUN 18 03/10/2020     03/10/2020    K 3.8 03/10/2020     03/10/2020    CO2 30 (H) 03/10/2020     No results for input(s): CKTOTAL, CKMB in the last 72 hours.     Invalid input(s): TROPONONI  No results found for: BNP  Lab Results   Component Value Date    WBC 4.9 03/10/2020    RBC 4.42 03/10/2020    HGB 14.0 03/10/2020    HCT 43.4 03/10/2020    MCV 98.2 03/10/2020    MCH 31.7 03/10/2020    MCHC 32.3 03/10/2020    RDW 13.5 03/10/2020     03/10/2020    MPV 11.3 03/10/2020     Recent Labs     03/10/20  0230   ALKPHOS 89   ALT 13   AST 16   PROT 6.5   BILITOT 0.6   LABALBU 3.9     Lab Results   Component Value Date    MG 1.7 09/06/2019     Lab Results   Component Value Date    PROTIME 12.4 03/10/2020    PROTIME 11.4 10/31/2010    INR 1.0 03/10/2020     No results found for: TSH  No components found for: CHLPL  Lab Results   Component Value Date    TRIG 119 09/04/2019     Lab Results   Component Value Date    HDL 64 09/04/2019     Lab Results   Component Value Date    LDLCALC 59 09/04/2019     Lab Results   Component Value Date    INR 1.0 03/10/2020       Admission ECG personally reviewed by me revealed atrial fibrillation ventricular man pacing heart rate is 70. ASSESSMENT / PLAN:    1. Acute on chronic systolic CHF and mildly elevated BNP at 1771. IV Bumex 1 mg twice a day. CHF education reviewed. Also continue lisinopril 20 mg daily, Aldactone 25 mg daily, metoprolol succinate 100 mg daily. Suspect some of the decompensation related to dietary indiscretion as he eats several meals out per week and probably a fair amount of significant salt load intake. #2 permanent atrial fibrillation with ventricular rate controlled continue Eliquis 5 mg twice a day and denies any stroke symptoms, bleeding or falls. #3 chronic CAD prior PTCA and continue to modify cardiovascular risk factors. #4 Medtronic permanent pacemaker sensing and pacing appropriately. Underlying atrial fibrillation. Follow-up outpatient device both in office and remotely. #5 moderate mitral valve regurgitation and continue afterload reduction and optimizing blood pressure. Can ambulate on telemetry as tolerated. 2 g sodium restricted diet. CHF education provided.                 Electronically signed by Nico Bal MD on 3/10/2020 at 9:30 AM

## 2020-03-10 NOTE — ED PROVIDER NOTES
This is a [de-identified]years old male with a past medical history significant for CHF, who presented to our emergency room with a chief complaint of shortness of breath and weight gain about 13 pounds over past few days. Patient denies chest pain. Patient denies URI. Patient denies fever. No other complaints at this time. The history is provided by the patient. No  was used. Shortness of Breath   Severity:  Moderate  Onset quality:  Gradual  Timing:  Constant  Progression:  Worsening  Chronicity:  Recurrent  Context: activity    Context: not animal exposure, not emotional upset, not fumes, not known allergens, not occupational exposure, not pollens, not smoke exposure, not strong odors, not URI and not weather changes    Relieved by:  Nothing  Worsened by:  Nothing  Ineffective treatments:  None tried  Associated symptoms: no abdominal pain, no chest pain, no claudication, no cough, no diaphoresis, no ear pain, no fever, no headaches, no hemoptysis, no neck pain, no PND, no rash, no sore throat, no sputum production, no syncope, no swollen glands and no vomiting    Risk factors: obesity    Risk factors: no recent alcohol use, no family hx of DVT, no hx of cancer, no hx of PE/DVT, no oral contraceptive use, no recent surgery and no tobacco use         Review of Systems   Constitutional: Negative for diaphoresis and fever. HENT: Negative. Negative for ear pain and sore throat. Eyes: Negative. Respiratory: Positive for shortness of breath. Negative for cough, hemoptysis and sputum production. Cardiovascular: Negative. Negative for chest pain, claudication, syncope and PND. Gastrointestinal: Negative. Negative for abdominal pain and vomiting. Endocrine: Negative. Genitourinary: Negative. Musculoskeletal: Negative. Negative for neck pain. Skin: Negative for rash. Neurological: Negative for headaches. Psychiatric/Behavioral: Negative.     All other systems reviewed and Protime 12.4 9.3 - 12.4 sec    INR 1.0    APTT   Result Value Ref Range    aPTT 34.4 24.5 - 35.1 sec   EKG 12 Lead   Result Value Ref Range    Ventricular Rate 70 BPM    Atrial Rate 55 BPM    QRS Duration 194 ms    Q-T Interval 512 ms    QTc Calculation (Bazett) 552 ms    R Axis -67 degrees    T Axis 93 degrees       RADIOLOGY:  XR CHEST PORTABLE    (Results Pending)       EKG: This EKG is signed and interpreted by me. EKG was done at 252 a.m. electronic pacemaker. Rate of 70. No other information is available on this EKG. No old EKG available for comparison}      ------------------------- NURSING NOTES AND VITALS REVIEWED ---------------------------  Date / Time Roomed:  3/10/2020  1:57 AM  ED Bed Assignment:  25/25    The nursing notes within the ED encounter and vital signs as below have been reviewed. Patient Vitals for the past 24 hrs:   BP Temp Temp src Pulse Resp SpO2 Height Weight   03/10/20 0316 (!) 171/89 97.5 °F (36.4 °C) Oral 74 20 92 % 5' 11\" (1.803 m) 267 lb (121.1 kg)   03/10/20 0200 (!) 182/84 -- -- 73 24 98 % -- --   03/10/20 0157 -- -- -- -- -- -- 5' 10\" (1.778 m) 249 lb (112.9 kg)       Oxygen Saturation Interpretation: Abnormal    ------------------------------------------ PROGRESS NOTES ------------------------------------------  Re-evaluation(s):  Time: 5:00 am  Patients symptoms are improving  Repeat physical examination is improved    Counseling:  I have spoken with the patient and discussed todays results, in addition to providing specific details for the plan of care and counseling regarding the diagnosis and prognosis. Their questions are answered at this time and they are agreeable with the plan of admission.    --------------------------------- ADDITIONAL PROVIDER NOTES ---------------------------------  Consultations:  Time: 5:00 am. Spoke with Dr. Mihir Collado. Discussed case. They will admit the patient.   This patient's ED course included: a personal history and physicial examination and re-evaluation prior to disposition    This patient has remained hemodynamically stable during their ED course. Diagnosis:  1. Congestive heart failure, unspecified HF chronicity, unspecified heart failure type ([address] New Problem       Disposition:  Patient's disposition: Admit to telemetry  Patient's condition is stable.                 Aye Flaherty, [place]  03/10/20 7188

## 2020-03-10 NOTE — CARE COORDINATION
3/10/2020  Social Work Discharge Planning: This worker went to meet with Pt;however, Pt is currently out of his room. SW will continue to follow. Electronically signed by JENNIFER Freeman on 3/10/2020 at 1:24 PM

## 2020-03-11 VITALS
RESPIRATION RATE: 18 BRPM | HEART RATE: 70 BPM | SYSTOLIC BLOOD PRESSURE: 134 MMHG | HEIGHT: 71 IN | BODY MASS INDEX: 36.5 KG/M2 | DIASTOLIC BLOOD PRESSURE: 98 MMHG | WEIGHT: 260.7 LBS | TEMPERATURE: 98 F | OXYGEN SATURATION: 95 %

## 2020-03-11 LAB
ANION GAP SERPL CALCULATED.3IONS-SCNC: 11 MMOL/L (ref 7–16)
BASOPHILS ABSOLUTE: 0.02 E9/L (ref 0–0.2)
BASOPHILS RELATIVE PERCENT: 0.4 % (ref 0–2)
BUN BLDV-MCNC: 16 MG/DL (ref 8–23)
CALCIUM SERPL-MCNC: 9.2 MG/DL (ref 8.6–10.2)
CHLORIDE BLD-SCNC: 100 MMOL/L (ref 98–107)
CO2: 34 MMOL/L (ref 22–29)
CREAT SERPL-MCNC: 1.2 MG/DL (ref 0.7–1.2)
EOSINOPHILS ABSOLUTE: 0.17 E9/L (ref 0.05–0.5)
EOSINOPHILS RELATIVE PERCENT: 3.1 % (ref 0–6)
GFR AFRICAN AMERICAN: >60
GFR NON-AFRICAN AMERICAN: 58 ML/MIN/1.73
GLUCOSE BLD-MCNC: 104 MG/DL (ref 74–99)
HCT VFR BLD CALC: 45.9 % (ref 37–54)
HEMOGLOBIN: 15.1 G/DL (ref 12.5–16.5)
IMMATURE GRANULOCYTES #: 0.02 E9/L
IMMATURE GRANULOCYTES %: 0.4 % (ref 0–5)
LYMPHOCYTES ABSOLUTE: 1.12 E9/L (ref 1.5–4)
LYMPHOCYTES RELATIVE PERCENT: 20.7 % (ref 20–42)
MCH RBC QN AUTO: 32 PG (ref 26–35)
MCHC RBC AUTO-ENTMCNC: 32.9 % (ref 32–34.5)
MCV RBC AUTO: 97.2 FL (ref 80–99.9)
MONOCYTES ABSOLUTE: 0.47 E9/L (ref 0.1–0.95)
MONOCYTES RELATIVE PERCENT: 8.7 % (ref 2–12)
NEUTROPHILS ABSOLUTE: 3.62 E9/L (ref 1.8–7.3)
NEUTROPHILS RELATIVE PERCENT: 66.7 % (ref 43–80)
PDW BLD-RTO: 13.4 FL (ref 11.5–15)
PLATELET # BLD: 136 E9/L (ref 130–450)
PMV BLD AUTO: 11.2 FL (ref 7–12)
POTASSIUM REFLEX MAGNESIUM: 3.8 MMOL/L (ref 3.5–5)
RBC # BLD: 4.72 E12/L (ref 3.8–5.8)
SODIUM BLD-SCNC: 145 MMOL/L (ref 132–146)
WBC # BLD: 5.4 E9/L (ref 4.5–11.5)

## 2020-03-11 PROCEDURE — 2580000003 HC RX 258: Performed by: FAMILY MEDICINE

## 2020-03-11 PROCEDURE — 96376 TX/PRO/DX INJ SAME DRUG ADON: CPT

## 2020-03-11 PROCEDURE — 99217 PR OBSERVATION CARE DISCHARGE MANAGEMENT: CPT | Performed by: INTERNAL MEDICINE

## 2020-03-11 PROCEDURE — 85025 COMPLETE CBC W/AUTO DIFF WBC: CPT

## 2020-03-11 PROCEDURE — 36415 COLL VENOUS BLD VENIPUNCTURE: CPT

## 2020-03-11 PROCEDURE — 80048 BASIC METABOLIC PNL TOTAL CA: CPT

## 2020-03-11 PROCEDURE — 2500000003 HC RX 250 WO HCPCS: Performed by: FAMILY MEDICINE

## 2020-03-11 PROCEDURE — G0378 HOSPITAL OBSERVATION PER HR: HCPCS

## 2020-03-11 PROCEDURE — 6370000000 HC RX 637 (ALT 250 FOR IP): Performed by: FAMILY MEDICINE

## 2020-03-11 RX ORDER — BUMETANIDE 2 MG/1
1 TABLET ORAL DAILY
Qty: 30 TABLET | Refills: 0 | Status: ON HOLD | OUTPATIENT
Start: 2020-03-11 | End: 2020-11-22 | Stop reason: HOSPADM

## 2020-03-11 RX ADMIN — LISINOPRIL 20 MG: 20 TABLET ORAL at 08:29

## 2020-03-11 RX ADMIN — SPIRONOLACTONE 25 MG: 25 TABLET ORAL at 08:29

## 2020-03-11 RX ADMIN — CETIRIZINE HYDROCHLORIDE 10 MG: 10 TABLET, FILM COATED ORAL at 08:29

## 2020-03-11 RX ADMIN — BUMETANIDE 1 MG: 0.25 INJECTION INTRAMUSCULAR; INTRAVENOUS at 08:29

## 2020-03-11 RX ADMIN — CYANOCOBALAMIN TAB 1000 MCG 1000 MCG: 1000 TAB at 08:29

## 2020-03-11 RX ADMIN — SODIUM CHLORIDE, PRESERVATIVE FREE 10 ML: 5 INJECTION INTRAVENOUS at 08:30

## 2020-03-11 RX ADMIN — APIXABAN 5 MG: 5 TABLET, FILM COATED ORAL at 08:30

## 2020-03-11 RX ADMIN — METOPROLOL SUCCINATE 100 MG: 100 TABLET, EXTENDED RELEASE ORAL at 08:29

## 2020-03-11 ASSESSMENT — PAIN SCALES - GENERAL
PAINLEVEL_OUTOF10: 0
PAINLEVEL_OUTOF10: 0

## 2020-03-11 NOTE — FLOWSHEET NOTE
I met with the patient and his daughter to discuss advance care planning. The patient does not have advance directives and is not interested at this time. The patient is not Yazdanism.

## 2020-03-11 NOTE — PROGRESS NOTES
[facility] CARDIOLOGY PROGRESS NOTE  [facility]        Subjective:  SOB, VENEGAS September 2019 echo LVEF 35 to 40% with moderate mitral valve regurgitation. Permanent AF on chronic Eliquis 5 mg twice a day. By his report he is feeling better today sitting up in a chair at bedside without indication of sputum production, chest pain or distress. Objective: Labs chart medications and telemetry are reviewed. Patient Vitals for the past 24 hrs:   BP Temp Temp src Pulse Resp SpO2 Weight   03/11/20 0815 (!) 134/98 98 °F (36.7 °C) Oral 70 18 95 % --   03/11/20 0551 -- -- -- -- -- -- 260 lb 11.2 oz (118.3 kg)   03/11/20 0130 129/74 98 °F (36.7 °C) Oral 74 18 92 % --   03/10/20 2100 (!) 157/82 97.4 °F (36.3 °C) Oral 69 18 95 % --   03/10/20 1549 (!) 166/87 97.3 °F (36.3 °C) Oral 70 18 -- --   03/10/20 1515 -- -- -- 70 -- 94 % --         Intake/Output Summary (Last 24 hours) at 3/11/2020 1033  Last data filed at 3/11/2020 0130  Gross per 24 hour   Intake 360 ml   Output 1475 ml   Net -1115 ml       Wt Readings from Last 3 Encounters:   03/11/20 260 lb 11.2 oz (118.3 kg)   09/06/19 249 lb 9.6 oz (113.2 kg)       Telemetry: I personally reviewed and shows atrial fibrillation with ventricular demand pacing heart rate is 70.     Current meds: Scheduled Meds:   apixaban  5 mg Oral BID    cetirizine  10 mg Oral Daily    lisinopril  20 mg Oral Daily    metoprolol succinate  100 mg Oral Daily    spironolactone  25 mg Oral Daily    vitamin B-12  1,000 mcg Oral Daily    bumetanide  1 mg Intravenous BID    sodium chloride flush  10 mL Intravenous 2 times per day     Continuous Infusions:  PRN Meds:.sodium chloride flush, acetaminophen **OR** acetaminophen, polyethylene glycol, promethazine **OR** ondansetron    Allergies: Coumadin [warfarin sodium] and Heparin      Labs:   Recent Labs     03/10/20  0230 03/11/20  0740   WBC 4.9 5.4   HGB 14.0 15.1   HCT 43.4 45.9   MCV 98.2 97.2    136       Labs:   Recent

## 2020-03-11 NOTE — DISCHARGE SUMMARY
MG tablet  Commonly known as:  BUMEX  Take 0.5 tablets by mouth daily        CONTINUE taking these medications    cetirizine 10 MG tablet  Commonly known as:  ZYRTEC     Eliquis 5 MG Tabs tablet  Generic drug:  apixaban     lisinopril 20 MG tablet  Commonly known as:  PRINIVIL;ZESTRIL  Take 1 tablet by mouth daily     metoprolol succinate 100 MG extended release tablet  Commonly known as:  TOPROL XL     spironolactone 25 MG tablet  Commonly known as:  ALDACTONE     vitamin B-12 1000 MCG tablet  Commonly known as:  CYANOCOBALAMIN        STOP taking these medications    furosemide 40 MG tablet  Commonly known as:  LASIX           Where to Get Your Medications      These medications were sent to [facility] [address]  [address] [address]    Phone:  [phone]   · bumetanide 2 MG tablet           Note that more than 30 minutes was spent in preparing discharge papers, discussing discharge with patient, medication review, etc.    Signed:  Electronically signed by Shana Kenny MD on 3/11/2020 at 3:36 PM    NOTE: This report was transcribed using voice recognition software. Every effort was made to ensure accuracy; however, inadvertent computerized transcription errors may be present.

## 2020-03-11 NOTE — DISCHARGE INSTR - DIET

## 2020-08-16 ENCOUNTER — HOSPITAL ENCOUNTER (EMERGENCY)
Age: 80
Discharge: HOME | End: 2020-08-16
Attending: FAMILY MEDICINE
Payer: MEDICARE

## 2020-08-16 ENCOUNTER — APPOINTMENT (OUTPATIENT)
Dept: GENERAL RADIOLOGY | Age: 80
End: 2020-08-16
Payer: MEDICARE

## 2020-08-16 VITALS
RESPIRATION RATE: 18 BRPM | HEIGHT: 71 IN | TEMPERATURE: 95.3 F | HEART RATE: 82 BPM | SYSTOLIC BLOOD PRESSURE: 134 MMHG | WEIGHT: 258 LBS | DIASTOLIC BLOOD PRESSURE: 80 MMHG | OXYGEN SATURATION: 91 % | BODY MASS INDEX: 36.12 KG/M2

## 2020-08-16 LAB
ALBUMIN SERPL-MCNC: 3.8 G/DL (ref 3.5–5.2)
ALP BLD-CCNC: 82 U/L (ref 40–129)
ALT SERPL-CCNC: 14 U/L (ref 0–40)
ANION GAP SERPL CALCULATED.3IONS-SCNC: 10 MMOL/L (ref 7–16)
AST SERPL-CCNC: 20 U/L (ref 0–39)
BASOPHILS ABSOLUTE: 0.02 E9/L (ref 0–0.2)
BASOPHILS RELATIVE PERCENT: 0.3 % (ref 0–2)
BILIRUB SERPL-MCNC: 0.8 MG/DL (ref 0–1.2)
BUN BLDV-MCNC: 20 MG/DL (ref 8–23)
C-REACTIVE PROTEIN: 1.7 MG/DL (ref 0–0.4)
CALCIUM SERPL-MCNC: 9.3 MG/DL (ref 8.6–10.2)
CHLORIDE BLD-SCNC: 100 MMOL/L (ref 98–107)
CO2: 30 MMOL/L (ref 22–29)
CREAT SERPL-MCNC: 1.1 MG/DL (ref 0.7–1.2)
EOSINOPHILS ABSOLUTE: 0.2 E9/L (ref 0.05–0.5)
EOSINOPHILS RELATIVE PERCENT: 3.4 % (ref 0–6)
GFR AFRICAN AMERICAN: >60
GFR NON-AFRICAN AMERICAN: >60 ML/MIN/1.73
GLUCOSE BLD-MCNC: 105 MG/DL (ref 74–99)
HCT VFR BLD CALC: 43.3 % (ref 37–54)
HEMOGLOBIN: 15.1 G/DL (ref 12.5–16.5)
IMMATURE GRANULOCYTES #: 0.01 E9/L
IMMATURE GRANULOCYTES %: 0.2 % (ref 0–5)
LYMPHOCYTES ABSOLUTE: 0.81 E9/L (ref 1.5–4)
LYMPHOCYTES RELATIVE PERCENT: 13.8 % (ref 20–42)
MCH RBC QN AUTO: 33.5 PG (ref 26–35)
MCHC RBC AUTO-ENTMCNC: 34.9 % (ref 32–34.5)
MCV RBC AUTO: 96 FL (ref 80–99.9)
MONOCYTES ABSOLUTE: 0.72 E9/L (ref 0.1–0.95)
MONOCYTES RELATIVE PERCENT: 12.2 % (ref 2–12)
NEUTROPHILS ABSOLUTE: 4.13 E9/L (ref 1.8–7.3)
NEUTROPHILS RELATIVE PERCENT: 70.1 % (ref 43–80)
PDW BLD-RTO: 13.8 FL (ref 11.5–15)
PLATELET # BLD: 145 E9/L (ref 130–450)
PMV BLD AUTO: 11 FL (ref 7–12)
POTASSIUM SERPL-SCNC: 3.6 MMOL/L (ref 3.5–5)
RBC # BLD: 4.51 E12/L (ref 3.8–5.8)
SEDIMENTATION RATE, ERYTHROCYTE: 19 MM/HR (ref 0–15)
SODIUM BLD-SCNC: 140 MMOL/L (ref 132–146)
TOTAL PROTEIN: 6.8 G/DL (ref 6.4–8.3)
URIC ACID, SERUM: 8.3 MG/DL (ref 3.4–7)
WBC # BLD: 5.9 E9/L (ref 4.5–11.5)

## 2020-08-16 PROCEDURE — 96374 THER/PROPH/DIAG INJ IV PUSH: CPT

## 2020-08-16 PROCEDURE — 85025 COMPLETE CBC W/AUTO DIFF WBC: CPT

## 2020-08-16 PROCEDURE — 36415 COLL VENOUS BLD VENIPUNCTURE: CPT

## 2020-08-16 PROCEDURE — 80053 COMPREHEN METABOLIC PANEL: CPT

## 2020-08-16 PROCEDURE — 86140 C-REACTIVE PROTEIN: CPT

## 2020-08-16 PROCEDURE — 6370000000 HC RX 637 (ALT 250 FOR IP): Performed by: FAMILY MEDICINE

## 2020-08-16 PROCEDURE — 6360000002 HC RX W HCPCS: Performed by: FAMILY MEDICINE

## 2020-08-16 PROCEDURE — 99283 EMERGENCY DEPT VISIT LOW MDM: CPT

## 2020-08-16 PROCEDURE — 84550 ASSAY OF BLOOD/URIC ACID: CPT

## 2020-08-16 PROCEDURE — 96375 TX/PRO/DX INJ NEW DRUG ADDON: CPT

## 2020-08-16 PROCEDURE — 73630 X-RAY EXAM OF FOOT: CPT

## 2020-08-16 PROCEDURE — 85651 RBC SED RATE NONAUTOMATED: CPT

## 2020-08-16 RX ORDER — FUROSEMIDE 40 MG/1
40 TABLET ORAL DAILY
Status: ON HOLD | COMMUNITY
End: 2020-11-22 | Stop reason: HOSPADM

## 2020-08-16 RX ORDER — PREDNISONE 10 MG/1
TABLET ORAL
Qty: 10 TABLET | Refills: 0 | Status: SHIPPED | OUTPATIENT
Start: 2020-08-16 | End: 2020-08-26

## 2020-08-16 RX ORDER — ACETAMINOPHEN 500 MG
1000 TABLET ORAL EVERY 8 HOURS PRN
Qty: 50 TABLET | Refills: 0 | Status: ON HOLD | OUTPATIENT
Start: 2020-08-16 | End: 2022-10-14 | Stop reason: HOSPADM

## 2020-08-16 RX ORDER — VALSARTAN 80 MG/1
80 TABLET ORAL DAILY
Status: ON HOLD | COMMUNITY
End: 2022-10-14 | Stop reason: HOSPADM

## 2020-08-16 RX ORDER — KETOROLAC TROMETHAMINE 30 MG/ML
15 INJECTION, SOLUTION INTRAMUSCULAR; INTRAVENOUS ONCE
Status: COMPLETED | OUTPATIENT
Start: 2020-08-16 | End: 2020-08-16

## 2020-08-16 RX ORDER — DEXAMETHASONE SODIUM PHOSPHATE 10 MG/ML
10 INJECTION, SOLUTION INTRAMUSCULAR; INTRAVENOUS ONCE
Status: COMPLETED | OUTPATIENT
Start: 2020-08-16 | End: 2020-08-16

## 2020-08-16 RX ORDER — IBUPROFEN 400 MG/1
400 TABLET ORAL EVERY 8 HOURS PRN
Qty: 12 TABLET | Refills: 0 | Status: ON HOLD | OUTPATIENT
Start: 2020-08-16 | End: 2022-10-14 | Stop reason: HOSPADM

## 2020-08-16 RX ORDER — COLCHICINE 0.6 MG/1
1.2 TABLET ORAL ONCE
Status: COMPLETED | OUTPATIENT
Start: 2020-08-16 | End: 2020-08-16

## 2020-08-16 RX ORDER — OMEPRAZOLE 20 MG/1
20 CAPSULE, DELAYED RELEASE ORAL DAILY
Status: ON HOLD | COMMUNITY
End: 2021-08-12

## 2020-08-16 RX ADMIN — DEXAMETHASONE SODIUM PHOSPHATE 10 MG: 10 INJECTION, SOLUTION INTRAMUSCULAR; INTRAVENOUS at 13:28

## 2020-08-16 RX ADMIN — COLCHICINE 1.2 MG: 0.6 TABLET, FILM COATED ORAL at 13:28

## 2020-08-16 RX ADMIN — KETOROLAC TROMETHAMINE 15 MG: 30 INJECTION, SOLUTION INTRAMUSCULAR at 13:28

## 2020-08-16 ASSESSMENT — PAIN DESCRIPTION - DESCRIPTORS: DESCRIPTORS: THROBBING

## 2020-08-16 ASSESSMENT — PAIN DESCRIPTION - PAIN TYPE
TYPE: ACUTE PAIN
TYPE: ACUTE PAIN

## 2020-08-16 ASSESSMENT — PAIN DESCRIPTION - LOCATION
LOCATION: FOOT
LOCATION: FOOT

## 2020-08-16 ASSESSMENT — PAIN SCALES - GENERAL
PAINLEVEL_OUTOF10: 10
PAINLEVEL_OUTOF10: 6
PAINLEVEL_OUTOF10: 10

## 2020-08-16 ASSESSMENT — PAIN DESCRIPTION - ORIENTATION
ORIENTATION: LEFT
ORIENTATION: LEFT

## 2020-08-16 ASSESSMENT — PAIN DESCRIPTION - PROGRESSION
CLINICAL_PROGRESSION: GRADUALLY IMPROVING
CLINICAL_PROGRESSION: GRADUALLY WORSENING

## 2020-08-16 ASSESSMENT — PAIN DESCRIPTION - FREQUENCY: FREQUENCY: CONTINUOUS

## 2020-08-16 NOTE — ED PROVIDER NOTES
Department of Emergency Medicine   ED  Provider Note  Admit Date/RoomTime: 8/16/2020 11:07 AM  ED Room: 12/12   Chief Complaint   Foot Pain (left foot pain and edema starting thursday, denies injury)    History of Present Illness   Source of history provided by:  patient and relative(s). History/Exam Limitations: none. Odalys Robledo is a [de-identified] y.o. old male presenting to the emergency department by private vehicle and ambulatory, for Left foot pain which occured 3 day(s) prior to arrival.  Cause of complaint: none known There has been a history of no prior problems with this area in the past.  Since onset the symptoms have been moderate in degree with ability to bear weight, but with some pain. His pain is aggraveated by standing, walking and relieved by nothing. ROS    Pertinent positives and negatives are stated within HPI, all other systems reviewed and are negative. Past Surgical History:   Procedure Laterality Date    ABLATION OF DYSRHYTHMIC FOCUS      x4    FINGER AMPUTATION      x3 right hand    SHOULDER SURGERY      total replacement bilat   Social History:  reports that he has quit smoking. He has never used smokeless tobacco. He reports current alcohol use of about 14.0 standard drinks of alcohol per week. Family History: family history is not on file. Allergies: Coumadin [warfarin sodium] and Heparin    Physical Exam           ED Triage Vitals   BP Temp Temp Source Pulse Resp SpO2 Height Weight   08/16/20 1112 08/16/20 1112 08/16/20 1112 08/16/20 1112 08/16/20 1112 08/16/20 1112 08/16/20 1113 08/16/20 1113   134/80 95.3 °F (35.2 °C) Temporal 82 18 91 % 5' 11\" (1.803 m) 258 lb (117 kg)      Oxygen Saturation Interpretation: Normal.    Constitutional:  Alert, development consistent with age. Neck:  Normal ROM. Supple. Foot: Left entire foot               Tenderness:  moderate. Swelling: Mild. Deformity: no.              ROM: diminished range with pain. Skin: Erythematous. Neurovascular: Motor deficit: none. Sensory deficit:   none. Pulse deficit: none. Capillary refill: normal.  Ankle:               Tenderness:  none. Swelling: None. Deformity: no.             ROM: full range of motion. Skin:  no erythema, rash or wounds noted. Gait:  limp. Lymphatics: No lymphangitis or adenopathy noted. Neurological:  Oriented. Motor functions intact.     Lab / Imaging Results   (All laboratory and radiology results have been personally reviewed by myself)  Labs:  Results for orders placed or performed during the hospital encounter of 08/16/20   CBC Auto Differential   Result Value Ref Range    WBC 5.9 4.5 - 11.5 E9/L    RBC 4.51 3.80 - 5.80 E12/L    Hemoglobin 15.1 12.5 - 16.5 g/dL    Hematocrit 43.3 37.0 - 54.0 %    MCV 96.0 80.0 - 99.9 fL    MCH 33.5 26.0 - 35.0 pg    MCHC 34.9 (H) 32.0 - 34.5 %    RDW 13.8 11.5 - 15.0 fL    Platelets 426 281 - 957 E9/L    MPV 11.0 7.0 - 12.0 fL    Neutrophils % 70.1 43.0 - 80.0 %    Immature Granulocytes % 0.2 0.0 - 5.0 %    Lymphocytes % 13.8 (L) 20.0 - 42.0 %    Monocytes % 12.2 (H) 2.0 - 12.0 %    Eosinophils % 3.4 0.0 - 6.0 %    Basophils % 0.3 0.0 - 2.0 %    Neutrophils Absolute 4.13 1.80 - 7.30 E9/L    Immature Granulocytes # 0.01 E9/L    Lymphocytes Absolute 0.81 (L) 1.50 - 4.00 E9/L    Monocytes Absolute 0.72 0.10 - 0.95 E9/L    Eosinophils Absolute 0.20 0.05 - 0.50 E9/L    Basophils Absolute 0.02 0.00 - 0.20 E9/L   Comprehensive Metabolic Panel   Result Value Ref Range    Sodium 140 132 - 146 mmol/L    Potassium 3.6 3.5 - 5.0 mmol/L    Chloride 100 98 - 107 mmol/L    CO2 30 (H) 22 - 29 mmol/L    Anion Gap 10 7 - 16 mmol/L    Glucose 105 (H) 74 - 99 mg/dL    BUN 20 8 - 23 mg/dL    CREATININE 1.1 0.7 - 1.2 mg/dL    GFR Non-African American >60 >=60 mL/min/1.73    GFR African American >60     Calcium 9.3 8.6 - 10.2 mg/dL    Total Protein 6.8 6.4 - 8.3 g/dL    Alb 3.8 3.5 - 5.2 g/dL    Total Bilirubin 0.8 0.0 - 1.2 mg/dL    Alkaline Phosphatase 82 40 - 129 U/L    ALT 14 0 - 40 U/L    AST 20 0 - 39 U/L   Uric acid   Result Value Ref Range    Uric Acid, Serum 8.3 (H) 3.4 - 7.0 mg/dL     Imaging: All Radiology results interpreted by Radiologist unless otherwise noted. XR FOOT LEFT (MIN 3 VIEWS)   Final Result      No acute fracture is identified. Degenerative changes. ED Course / Medical Decision Making     Medications   ketorolac (TORADOL) injection 15 mg (has no administration in time range)   colchicine (COLCRYS) tablet 1.2 mg (has no administration in time range)   dexamethasone (PF) (DECADRON) injection 10 mg (has no administration in time range)        Consult(s):   none. Procedure(s):   none    Medical Decision Making:    Patient is noted to have elevated uric acid white count is normal most likely gout films were obtained based on low  suspicion for bony injury as per history/physical findings . Plan is subsequently for symptom control, limited use and  immobilization with appropriate outpatient follow-up. Terri Gill Counseling: The emergency provider has spoken with the patient and family member patient and daughter and discussed todays results, in addition to providing specific details for the plan of care and counseling regarding the diagnosis and prognosis. Questions are answered at this time and they are agreeable with the plan. Assessment      1.  Acute idiopathic gout of left foot      Plan   Discharge to home  Patient condition is good    New Medications     New Prescriptions    ACETAMINOPHEN (TYLENOL) 500 MG TABLET    Take 2 tablets by mouth every 8 hours as needed for Pain    IBUPROFEN (IBU) 400 MG TABLET    Take 1 tablet by mouth every 8 hours as needed for Pain Take with full glass of water    PREDNISONE (DELTASONE) 10 MG TABLET    Two tablets qd     Electronically signed by Melissa Lauren MD   DD: 8/16/20  **This report was transcribed using voice recognition software. Every effort was made to ensure accuracy; however, inadvertent computerized transcription errors may be present.   END OF ED PROVIDER NOTE        Scott Huang MD  08/16/20 [id number]

## 2020-11-21 ENCOUNTER — APPOINTMENT (OUTPATIENT)
Dept: CT IMAGING | Age: 80
DRG: 177 | End: 2020-11-21
Payer: MEDICARE

## 2020-11-21 ENCOUNTER — HOSPITAL ENCOUNTER (INPATIENT)
Age: 80
LOS: 1 days | Discharge: HOME | DRG: 177 | End: 2020-11-22
Attending: EMERGENCY MEDICINE | Admitting: FAMILY MEDICINE
Payer: MEDICARE

## 2020-11-21 PROBLEM — J96.00 ACUTE RESPIRATORY FAILURE DUE TO COVID-19 (HCC): Status: ACTIVE | Noted: 2020-11-21

## 2020-11-21 PROBLEM — I10 ESSENTIAL HYPERTENSION: Status: ACTIVE | Noted: 2020-11-21

## 2020-11-21 PROBLEM — I48.91 ATRIAL FIBRILLATION (HCC): Status: ACTIVE | Noted: 2020-11-21

## 2020-11-21 PROBLEM — R19.7 DIARRHEA OF PRESUMED INFECTIOUS ORIGIN: Status: ACTIVE | Noted: 2020-11-21

## 2020-11-21 PROBLEM — U07.1 ACUTE RESPIRATORY FAILURE DUE TO COVID-19 (HCC): Status: ACTIVE | Noted: 2020-11-21

## 2020-11-21 LAB
ADENOVIRUS BY PCR: NOT DETECTED
ALBUMIN SERPL-MCNC: 3.2 G/DL (ref 3.5–5.2)
ALBUMIN SERPL-MCNC: 3.5 G/DL (ref 3.5–5.2)
ALP BLD-CCNC: 81 U/L (ref 40–129)
ALP BLD-CCNC: 86 U/L (ref 40–129)
ALT SERPL-CCNC: 16 U/L (ref 0–40)
ALT SERPL-CCNC: 18 U/L (ref 0–40)
ANION GAP SERPL CALCULATED.3IONS-SCNC: 11 MMOL/L (ref 7–16)
APTT: 30.5 SEC (ref 24.5–35.1)
AST SERPL-CCNC: 41 U/L (ref 0–39)
AST SERPL-CCNC: 47 U/L (ref 0–39)
ATYPICAL LYMPHOCYTE RELATIVE PERCENT: 0.9 % (ref 0–4)
BASOPHILS ABSOLUTE: 0 E9/L (ref 0–0.2)
BASOPHILS ABSOLUTE: 0.02 E9/L (ref 0–0.2)
BASOPHILS RELATIVE PERCENT: 0 % (ref 0–2)
BASOPHILS RELATIVE PERCENT: 0.2 % (ref 0–2)
BILIRUB SERPL-MCNC: 0.4 MG/DL (ref 0–1.2)
BILIRUB SERPL-MCNC: 0.6 MG/DL (ref 0–1.2)
BILIRUBIN DIRECT: 0.2 MG/DL (ref 0–0.3)
BILIRUBIN URINE: NEGATIVE
BILIRUBIN, INDIRECT: 0.4 MG/DL (ref 0–1)
BLOOD, URINE: NEGATIVE
BORDETELLA PARAPERTUSSIS BY PCR: NOT DETECTED
BORDETELLA PERTUSSIS BY PCR: NOT DETECTED
BUN BLDV-MCNC: 23 MG/DL (ref 8–23)
BUN BLDV-MCNC: 25 MG/DL (ref 8–23)
CALCIUM SERPL-MCNC: 9.1 MG/DL (ref 8.6–10.2)
CHLAMYDOPHILIA PNEUMONIAE BY PCR: NOT DETECTED
CHLORIDE BLD-SCNC: 104 MMOL/L (ref 98–107)
CLARITY: CLEAR
CO2: 30 MMOL/L (ref 22–29)
COLOR: NORMAL
CORONAVIRUS 229E BY PCR: NOT DETECTED
CORONAVIRUS HKU1 BY PCR: NOT DETECTED
CORONAVIRUS NL63 BY PCR: NOT DETECTED
CORONAVIRUS OC43 BY PCR: NOT DETECTED
CREAT SERPL-MCNC: 1 MG/DL (ref 0.7–1.2)
CREAT SERPL-MCNC: 1.1 MG/DL (ref 0.7–1.2)
D DIMER: 502 NG/ML DDU
EOSINOPHILS ABSOLUTE: 0 E9/L (ref 0.05–0.5)
EOSINOPHILS ABSOLUTE: 0.03 E9/L (ref 0.05–0.5)
EOSINOPHILS RELATIVE PERCENT: 0 % (ref 0–6)
EOSINOPHILS RELATIVE PERCENT: 0.3 % (ref 0–6)
FERRITIN: 842 NG/ML
FIBRINOGEN: >700 MG/DL (ref 225–540)
GFR AFRICAN AMERICAN: >60
GFR AFRICAN AMERICAN: >60
GFR NON-AFRICAN AMERICAN: >60 ML/MIN/1.73
GFR NON-AFRICAN AMERICAN: >60 ML/MIN/1.73
GLUCOSE BLD-MCNC: 87 MG/DL (ref 74–99)
GLUCOSE URINE: NEGATIVE MG/DL
HCT VFR BLD CALC: 49.1 % (ref 37–54)
HCT VFR BLD CALC: 50.1 % (ref 37–54)
HEMOGLOBIN: 16 G/DL (ref 12.5–16.5)
HEMOGLOBIN: 16.6 G/DL (ref 12.5–16.5)
HUMAN METAPNEUMOVIRUS BY PCR: NOT DETECTED
HUMAN RHINOVIRUS/ENTEROVIRUS BY PCR: NOT DETECTED
IMMATURE GRANULOCYTES #: 0.06 E9/L
IMMATURE GRANULOCYTES %: 0.6 % (ref 0–5)
INFLUENZA A BY PCR: NOT DETECTED
INFLUENZA B BY PCR: NOT DETECTED
INR BLD: 1.3
KETONES, URINE: NEGATIVE MG/DL
LACTATE DEHYDROGENASE: 444 U/L (ref 135–225)
LACTIC ACID: 1.6 MMOL/L (ref 0.5–2.2)
LEUKOCYTE ESTERASE, URINE: NEGATIVE
LIPASE: 68 U/L (ref 13–60)
LYMPHOCYTES ABSOLUTE: 0.69 E9/L (ref 1.5–4)
LYMPHOCYTES ABSOLUTE: 0.8 E9/L (ref 1.5–4)
LYMPHOCYTES RELATIVE PERCENT: 6.1 % (ref 20–42)
LYMPHOCYTES RELATIVE PERCENT: 7.8 % (ref 20–42)
MCH RBC QN AUTO: 32.7 PG (ref 26–35)
MCH RBC QN AUTO: 33 PG (ref 26–35)
MCHC RBC AUTO-ENTMCNC: 32.6 % (ref 32–34.5)
MCHC RBC AUTO-ENTMCNC: 33.1 % (ref 32–34.5)
MCV RBC AUTO: 100.2 FL (ref 80–99.9)
MCV RBC AUTO: 99.6 FL (ref 80–99.9)
MONOCYTES ABSOLUTE: 0.2 E9/L (ref 0.1–0.95)
MONOCYTES ABSOLUTE: 0.44 E9/L (ref 0.1–0.95)
MONOCYTES RELATIVE PERCENT: 1.7 % (ref 2–12)
MONOCYTES RELATIVE PERCENT: 4.3 % (ref 2–12)
MYCOPLASMA PNEUMONIAE BY PCR: NOT DETECTED
NEUTROPHILS ABSOLUTE: 8.92 E9/L (ref 1.8–7.3)
NEUTROPHILS ABSOLUTE: 8.94 E9/L (ref 1.8–7.3)
NEUTROPHILS RELATIVE PERCENT: 86.8 % (ref 43–80)
NEUTROPHILS RELATIVE PERCENT: 91.3 % (ref 43–80)
NITRITE, URINE: NEGATIVE
NUCLEATED RED BLOOD CELLS: 0.9 /100 WBC
PARAINFLUENZA VIRUS 1 BY PCR: NOT DETECTED
PARAINFLUENZA VIRUS 2 BY PCR: NOT DETECTED
PARAINFLUENZA VIRUS 3 BY PCR: NOT DETECTED
PARAINFLUENZA VIRUS 4 BY PCR: NOT DETECTED
PDW BLD-RTO: 13.4 FL (ref 11.5–15)
PDW BLD-RTO: 13.7 FL (ref 11.5–15)
PH UA: 6.5 (ref 5–9)
PLATELET # BLD: 207 E9/L (ref 130–450)
PLATELET # BLD: 214 E9/L (ref 130–450)
PMV BLD AUTO: 11.6 FL (ref 7–12)
PMV BLD AUTO: 11.7 FL (ref 7–12)
POLYCHROMASIA: ABNORMAL
POTASSIUM REFLEX MAGNESIUM: 4.3 MMOL/L (ref 3.5–5)
PRO-BNP: ABNORMAL PG/ML (ref 0–450)
PROCALCITONIN: 0.06 NG/ML (ref 0–0.08)
PROTEIN UA: NEGATIVE MG/DL
PROTHROMBIN TIME: 14.9 SEC (ref 9.3–12.4)
RBC # BLD: 4.9 E12/L (ref 3.8–5.8)
RBC # BLD: 5.03 E12/L (ref 3.8–5.8)
RESPIRATORY SYNCYTIAL VIRUS BY PCR: NOT DETECTED
SARS-COV-2, PCR: DETECTED
SODIUM BLD-SCNC: 145 MMOL/L (ref 132–146)
SPECIFIC GRAVITY UA: 1.01 (ref 1–1.03)
TOTAL CK: 45 U/L (ref 20–200)
TOTAL PROTEIN: 6.9 G/DL (ref 6.4–8.3)
TOTAL PROTEIN: 7.3 G/DL (ref 6.4–8.3)
TROPONIN: <0.01 NG/ML (ref 0–0.03)
TSH SERPL DL<=0.05 MIU/L-ACNC: 0.39 UIU/ML (ref 0.27–4.2)
UROBILINOGEN, URINE: 0.2 E.U./DL
WBC # BLD: 10.3 E9/L (ref 4.5–11.5)
WBC # BLD: 9.8 E9/L (ref 4.5–11.5)

## 2020-11-21 PROCEDURE — 81003 URINALYSIS AUTO W/O SCOPE: CPT

## 2020-11-21 PROCEDURE — 82728 ASSAY OF FERRITIN: CPT

## 2020-11-21 PROCEDURE — 6360000002 HC RX W HCPCS: Performed by: STUDENT IN AN ORGANIZED HEALTH CARE EDUCATION/TRAINING PROGRAM

## 2020-11-21 PROCEDURE — 6370000000 HC RX 637 (ALT 250 FOR IP): Performed by: CLINICAL NURSE SPECIALIST

## 2020-11-21 PROCEDURE — 83605 ASSAY OF LACTIC ACID: CPT

## 2020-11-21 PROCEDURE — 2580000003 HC RX 258: Performed by: STUDENT IN AN ORGANIZED HEALTH CARE EDUCATION/TRAINING PROGRAM

## 2020-11-21 PROCEDURE — 84443 ASSAY THYROID STIM HORMONE: CPT

## 2020-11-21 PROCEDURE — 83615 LACTATE (LD) (LDH) ENZYME: CPT

## 2020-11-21 PROCEDURE — 71275 CT ANGIOGRAPHY CHEST: CPT

## 2020-11-21 PROCEDURE — 85730 THROMBOPLASTIN TIME PARTIAL: CPT

## 2020-11-21 PROCEDURE — 6360000004 HC RX CONTRAST MEDICATION: Performed by: RADIOLOGY

## 2020-11-21 PROCEDURE — 85378 FIBRIN DEGRADE SEMIQUANT: CPT

## 2020-11-21 PROCEDURE — 85384 FIBRINOGEN ACTIVITY: CPT

## 2020-11-21 PROCEDURE — 82565 ASSAY OF CREATININE: CPT

## 2020-11-21 PROCEDURE — 84520 ASSAY OF UREA NITROGEN: CPT

## 2020-11-21 PROCEDURE — 84145 PROCALCITONIN (PCT): CPT

## 2020-11-21 PROCEDURE — 80053 COMPREHEN METABOLIC PANEL: CPT

## 2020-11-21 PROCEDURE — 82550 ASSAY OF CK (CPK): CPT

## 2020-11-21 PROCEDURE — 84484 ASSAY OF TROPONIN QUANT: CPT

## 2020-11-21 PROCEDURE — 85610 PROTHROMBIN TIME: CPT

## 2020-11-21 PROCEDURE — 2580000003 HC RX 258: Performed by: CLINICAL NURSE SPECIALIST

## 2020-11-21 PROCEDURE — APPSS45 APP SPLIT SHARED TIME 31-45 MINUTES: Performed by: CLINICAL NURSE SPECIALIST

## 2020-11-21 PROCEDURE — 85025 COMPLETE CBC W/AUTO DIFF WBC: CPT

## 2020-11-21 PROCEDURE — 80076 HEPATIC FUNCTION PANEL: CPT

## 2020-11-21 PROCEDURE — 0202U NFCT DS 22 TRGT SARS-COV-2: CPT

## 2020-11-21 PROCEDURE — 2060000000 HC ICU INTERMEDIATE R&B

## 2020-11-21 PROCEDURE — 74177 CT ABD & PELVIS W/CONTRAST: CPT

## 2020-11-21 PROCEDURE — 93005 ELECTROCARDIOGRAM TRACING: CPT | Performed by: STUDENT IN AN ORGANIZED HEALTH CARE EDUCATION/TRAINING PROGRAM

## 2020-11-21 PROCEDURE — 99285 EMERGENCY DEPT VISIT HI MDM: CPT

## 2020-11-21 PROCEDURE — 83880 ASSAY OF NATRIURETIC PEPTIDE: CPT

## 2020-11-21 PROCEDURE — 83690 ASSAY OF LIPASE: CPT

## 2020-11-21 RX ORDER — SPIRONOLACTONE 25 MG/1
25 TABLET ORAL DAILY
Status: DISCONTINUED | OUTPATIENT
Start: 2020-11-22 | End: 2020-11-22 | Stop reason: HOSPADM

## 2020-11-21 RX ORDER — DEXAMETHASONE SODIUM PHOSPHATE 10 MG/ML
10 INJECTION, SOLUTION INTRAMUSCULAR; INTRAVENOUS ONCE
Status: COMPLETED | OUTPATIENT
Start: 2020-11-21 | End: 2020-11-21

## 2020-11-21 RX ORDER — BUMETANIDE 1 MG/1
1 TABLET ORAL DAILY
Status: DISCONTINUED | OUTPATIENT
Start: 2020-11-22 | End: 2020-11-22

## 2020-11-21 RX ORDER — PROCHLORPERAZINE EDISYLATE 5 MG/ML
10 INJECTION INTRAMUSCULAR; INTRAVENOUS EVERY 6 HOURS PRN
Status: DISCONTINUED | OUTPATIENT
Start: 2020-11-21 | End: 2020-11-22 | Stop reason: HOSPADM

## 2020-11-21 RX ORDER — FUROSEMIDE 10 MG/ML
40 INJECTION INTRAMUSCULAR; INTRAVENOUS ONCE
Status: COMPLETED | OUTPATIENT
Start: 2020-11-21 | End: 2020-11-21

## 2020-11-21 RX ORDER — LACTOBACILLUS RHAMNOSUS GG 10B CELL
1 CAPSULE ORAL DAILY
Status: DISCONTINUED | OUTPATIENT
Start: 2020-11-22 | End: 2020-11-22 | Stop reason: HOSPADM

## 2020-11-21 RX ORDER — SPIRONOLACTONE 25 MG/1
25 TABLET ORAL DAILY
Status: CANCELLED | OUTPATIENT
Start: 2020-11-21

## 2020-11-21 RX ORDER — POLYETHYLENE GLYCOL 3350 17 G/17G
17 POWDER, FOR SOLUTION ORAL DAILY PRN
Status: DISCONTINUED | OUTPATIENT
Start: 2020-11-21 | End: 2020-11-22 | Stop reason: HOSPADM

## 2020-11-21 RX ORDER — PROMETHAZINE HYDROCHLORIDE 25 MG/1
12.5 TABLET ORAL EVERY 6 HOURS PRN
Status: DISCONTINUED | OUTPATIENT
Start: 2020-11-21 | End: 2020-11-21

## 2020-11-21 RX ORDER — ONDANSETRON 2 MG/ML
4 INJECTION INTRAMUSCULAR; INTRAVENOUS EVERY 6 HOURS PRN
Status: DISCONTINUED | OUTPATIENT
Start: 2020-11-21 | End: 2020-11-21

## 2020-11-21 RX ORDER — ACETAMINOPHEN 325 MG/1
650 TABLET ORAL EVERY 6 HOURS PRN
Status: DISCONTINUED | OUTPATIENT
Start: 2020-11-21 | End: 2020-11-22 | Stop reason: HOSPADM

## 2020-11-21 RX ORDER — SODIUM CHLORIDE 0.9 % (FLUSH) 0.9 %
10 SYRINGE (ML) INJECTION PRN
Status: DISCONTINUED | OUTPATIENT
Start: 2020-11-21 | End: 2020-11-22 | Stop reason: HOSPADM

## 2020-11-21 RX ORDER — SODIUM CHLORIDE 0.9 % (FLUSH) 0.9 %
10 SYRINGE (ML) INJECTION EVERY 12 HOURS SCHEDULED
Status: DISCONTINUED | OUTPATIENT
Start: 2020-11-21 | End: 2020-11-22 | Stop reason: HOSPADM

## 2020-11-21 RX ORDER — 0.9 % SODIUM CHLORIDE 0.9 %
1000 INTRAVENOUS SOLUTION INTRAVENOUS ONCE
Status: COMPLETED | OUTPATIENT
Start: 2020-11-21 | End: 2020-11-21

## 2020-11-21 RX ORDER — FUROSEMIDE 40 MG/1
40 TABLET ORAL 2 TIMES DAILY
Status: DISCONTINUED | OUTPATIENT
Start: 2020-11-22 | End: 2020-11-22

## 2020-11-21 RX ORDER — PANTOPRAZOLE SODIUM 40 MG/1
40 TABLET, DELAYED RELEASE ORAL
Status: DISCONTINUED | OUTPATIENT
Start: 2020-11-22 | End: 2020-11-22 | Stop reason: HOSPADM

## 2020-11-21 RX ORDER — METOPROLOL SUCCINATE 25 MG/1
100 TABLET, EXTENDED RELEASE ORAL DAILY
Status: DISCONTINUED | OUTPATIENT
Start: 2020-11-22 | End: 2020-11-22 | Stop reason: HOSPADM

## 2020-11-21 RX ORDER — LISINOPRIL 20 MG/1
20 TABLET ORAL DAILY
Status: DISCONTINUED | OUTPATIENT
Start: 2020-11-22 | End: 2020-11-22 | Stop reason: HOSPADM

## 2020-11-21 RX ORDER — CETIRIZINE HYDROCHLORIDE 10 MG/1
10 TABLET ORAL DAILY
Status: DISCONTINUED | OUTPATIENT
Start: 2020-11-22 | End: 2020-11-22 | Stop reason: HOSPADM

## 2020-11-21 RX ORDER — ACETAMINOPHEN 650 MG/1
650 SUPPOSITORY RECTAL EVERY 6 HOURS PRN
Status: DISCONTINUED | OUTPATIENT
Start: 2020-11-21 | End: 2020-11-22 | Stop reason: HOSPADM

## 2020-11-21 RX ORDER — LANOLIN ALCOHOL/MO/W.PET/CERES
1000 CREAM (GRAM) TOPICAL DAILY
Status: DISCONTINUED | OUTPATIENT
Start: 2020-11-22 | End: 2020-11-22 | Stop reason: HOSPADM

## 2020-11-21 RX ADMIN — SODIUM CHLORIDE, PRESERVATIVE FREE 10 ML: 5 INJECTION INTRAVENOUS at 23:11

## 2020-11-21 RX ADMIN — IOPAMIDOL 80 ML: 755 INJECTION, SOLUTION INTRAVENOUS at 12:39

## 2020-11-21 RX ADMIN — FUROSEMIDE 40 MG: 10 INJECTION, SOLUTION INTRAMUSCULAR; INTRAVENOUS at 14:16

## 2020-11-21 RX ADMIN — APIXABAN 5 MG: 5 TABLET, FILM COATED ORAL at 23:11

## 2020-11-21 RX ADMIN — SODIUM CHLORIDE 1000 ML: 9 INJECTION, SOLUTION INTRAVENOUS at 10:55

## 2020-11-21 RX ADMIN — DEXAMETHASONE SODIUM PHOSPHATE 10 MG: 10 INJECTION, SOLUTION INTRAMUSCULAR; INTRAVENOUS at 14:16

## 2020-11-21 ASSESSMENT — ENCOUNTER SYMPTOMS
ABDOMINAL PAIN: 1
VOMITING: 0
PHOTOPHOBIA: 0
TROUBLE SWALLOWING: 0
ABDOMINAL DISTENTION: 1
COUGH: 1
SORE THROAT: 0
NAUSEA: 0
SHORTNESS OF BREATH: 1
CONSTIPATION: 0
WHEEZING: 0
DIARRHEA: 1
EYE PAIN: 0

## 2020-11-21 ASSESSMENT — PAIN SCALES - GENERAL: PAINLEVEL_OUTOF10: 0

## 2020-11-21 NOTE — H&P
[facility] History and Physical      CHIEF COMPLAINT: Abdominal pain, nausea, vomiting and diarrhea ongoing for 1 week also with shortness of breath cough worsening over the last week. Chest heaviness today. History of Present Illness: This is a 80-year-old  man with a past medical history of CHF, hypertension, morbid obesity, sleep apnea, atrial fibrillation, hypertension. Patient comes into the ER today complaining of abdominal pain with nausea vomiting and diarrhea ongoing a week. He tells me that he is also had some dark emesis on and off over the last week and even through today having diarrhea 7-10 times all day. He told me that he does have a history of congestive heart failure and does feel volume overloaded because he cannot lay down without feeling like he cannot breathe and this is happened to him before he follows with [facility] cardiology in the past he stated. He feels that the shortness of breath cough of been getting worse he does not really describe any significant dietary or medication changes and he says his weight is pretty much the same he feels. Also complains of some chest heaviness and chest tightness with some generalized radiation to the arms mainly the left side. Describes it to me as a dull aching versus a stabbing chest pain. Upon arrival into the ER today did note a room air saturation of 84% he was placed on 3 L nasal cannula oxygen he typically does not require home oxygen. Had a CTA of the chest and CT the abdomen pelvis was negative for PE, CTA did show extensive bibasilar groundglass opacities otherwise no other GI related issues. Lipase was 68, lactic acid 1.6 troponins negative thus far. Labs show a BNP of 10,289, white blood cell count 10, hemoglobin 16, platelets 806, sodium 145, potassium 4.3 creatinine 1.0. He did receive Decadron and Lasix in the ER and 1 L fluid bolus.   Covid 19 PCR has been sent  Per previous notes it vitamin B-12 (CYANOCOBALAMIN) 1000 MCG tablet Take 1,000 mcg by mouth daily    Historical Provider, MD   cetirizine (ZYRTEC) 10 MG tablet Take 10 mg by mouth daily. Historical Provider, MD   spironolactone (ALDACTONE) 25 MG tablet Take 25 mg by mouth daily. Historical Provider, MD   metoprolol (TOPROL-XL) 100 MG XL tablet Take 100 mg by mouth daily     Historical Provider, MD       Allergies:    Coumadin [warfarin sodium] and Heparin    Social History:    reports that he has quit smoking. He has never used smokeless tobacco. He reports current alcohol use of about 14.0 standard drinks of alcohol per week. Patient is , 6 children, retired , no history of tobacco use or drug use, social drinker as above. Lives in a house with his girlfriend. From the [facility] area. Family History:   family history is not on file. Mother--denies any contributary histories, used a few profanities to describe her  Father- around age 79 had coronary artery disease      PHYSICAL EXAM:  Vitals:  BP (!) 177/103   Pulse 72   Temp 98.8 °F (37.1 °C)   Resp 20   Ht 5' 10\" (1.778 m)   Wt 260 lb (117.9 kg)   SpO2 95%   BMI 37.31 kg/m²     General Appearance: alert and oriented to person, place and time . shortness of breath, fatigue, weakness sitting at the bedside leaning over the bedside tray  Skin: warm and dry  Head: normocephalic and atraumatic  Eyes: pupils equal, round, and reactive to light, extraocular eye movements intact, conjunctivae normal  Neck: neck supple and non tender without mass   Pulmonary/Chest: 3 L nasal cannula oxygen, faint expiratory wheeze with some diminished respirations bilaterally. Dry intermittent cough.   Cardiovascular: normal rate, normal S1 and S2 and no carotid bruits  -Chest pacemaker  Abdomen: soft, generalized epigastric tenderness, softly distended, normal bowel sounds, no masses or organomegaly  Extremities: no cyanosis, no clubbing in the ER  -Dexamethasone in the ER  -COVID-19 sent  -We will continue steroids  -Send COVID-19 labs, interleukin-1, ferritin, CRP, D-dimer, LDH, procalcitonin, baseline labs repeated. -Titrate oxygen    Atypical chest pain substernal tenderness -follow in the setting of acute hypoxic respiratory failure respiratory distress  -Cycle troponins negative thus far  -EKG        Abdominal pain  -CT abdomen unremarkable no acute findings. ,  Renal cyst noted. -Been having diarrhea cramps nausea vomiting for about a week  -Zofran, Phenergan  -Send stool for C. difficile        Diarrhea  -As mentioned above ongoing for at least a week he said 7-10 watery diarrhea today  Differential would include viral gastroenteritis, C. difficile, COVID-19  -Send stool for C. Difficile  -Supportive care          Nausea and vomiting  -As above  -Compazine in setting of prolonged QTC        Congestive heart failure [place] class I-acute on chronic combined heart failure  -Acute on chronic systolic heart failure  -Last 2D echocardiogram September 2019 with some left ventricular hypertrophy EF of 35%  -Home regimen includes Bumex, Lasix and Aldactone---we will review med rec  -Consult local cardiology Dr Nicci White last consult   -Continue Lasix, Bumex, Aldactone for now with adjustments  -Received a dose of Lasix in the ER        Atrial fibrillation  -Continue home regimen of Eliquis and Toprol  -Monitor rhythm            Hypertension-monitor vitals  Continue with lisinopril, Toprol, diuretics  -Patient also on ARB ?--- review med rec. 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 Every effort was made to ensure accuracy; however, inadvertent computerized transcription errors may be present.   Electronically signed by MANUEL Bass on 11/21/2020 at 3:33 PM

## 2020-11-21 NOTE — PROGRESS NOTES
Please NOTE : the patient's QTc is 536 . The use of Zofran and Phenergan may further prolong the QT interval. Please DC the Zofran and Phenergan  at this time. If an antiemetic is needed, Tigan 200 mg IM q6h prn is a safer alternative. Thank you.

## 2020-11-21 NOTE — ED PROVIDER NOTES
Patient is a 59-year-old male with a past medical history CHF, A. fib on Eliquis, hypertension resented to the ED complaining of abdominal pain and shortness of breath. He reports that symptoms started about a week ago abruptly. He has been having diarrhea constantly since. As generalized abdominal pain that is intermittent. Reports that he has been been able to take anything per mouth. Does also have vomiting, dark emesis with some blood-tinged sputum. He reports that shortness of breath has progressively been getting worse. It is constant and does not take any oxygen at home. He did have a virtual visit with his PCP and was prescribed medications (zinc, vitamin C and prednisone) but denies taking any antibiotics. Patient reports also having midsternal chest pain, sharp and intermittent with some numbness in his left hand but denies any radiation to jaw or her left arm. He is independent at home and denies having any contact with Covid positive individuals. Reports having lightheadedness, dizziness, and blurry vision but denies any fever, chills, lower leg edema, palpitation or dysuria. Review of Systems   Constitutional: Positive for activity change, appetite change and fatigue. Negative for chills and fever. HENT: Negative for congestion, sore throat and trouble swallowing. Eyes: Positive for visual disturbance. Negative for photophobia and pain. Respiratory: Positive for cough and shortness of breath. Negative for wheezing. Cardiovascular: Positive for chest pain. Negative for palpitations and leg swelling. Gastrointestinal: Positive for abdominal distention, abdominal pain and diarrhea. Negative for constipation, nausea and vomiting. Endocrine: Negative for heat intolerance and polydipsia. Genitourinary: Negative for difficulty urinating, dysuria, frequency and hematuria. Musculoskeletal: Negative for joint swelling, neck pain and neck stiffness.    Skin: Negative for rash and wound. Neurological: Positive for dizziness, weakness and light-headedness. Negative for syncope, numbness and headaches. Psychiatric/Behavioral: Positive for sleep disturbance. Negative for agitation, behavioral problems and confusion. The patient is nervous/anxious. Physical Exam  Constitutional:       General: He is in acute distress (mild). Appearance: Normal appearance. He is obese. He is diaphoretic. He is not ill-appearing. HENT:      Head: Normocephalic and atraumatic. Right Ear: External ear normal.      Left Ear: External ear normal.      Nose: Nose normal. No congestion. Mouth/Throat:      Mouth: Mucous membranes are moist.      Pharynx: Oropharynx is clear. Eyes:      Conjunctiva/sclera: Conjunctivae normal.      Pupils: Pupils are equal, round, and reactive to light. Neck:      Musculoskeletal: Normal range of motion and neck supple. Cardiovascular:      Rate and Rhythm: Normal rate and regular rhythm. Pulses: Normal pulses. Heart sounds: Normal heart sounds. No murmur. Pulmonary:      Effort: Pulmonary effort is normal. No respiratory distress. Breath sounds: Examination of the right-upper field reveals decreased breath sounds. Examination of the left-upper field reveals decreased breath sounds. Examination of the right-middle field reveals decreased breath sounds. Examination of the left-middle field reveals decreased breath sounds. Examination of the right-lower field reveals decreased breath sounds and rales. Examination of the left-lower field reveals decreased breath sounds and rales. Decreased breath sounds and rales present. No wheezing or rhonchi. Comments: And on 4 L via nasal cannula  Abdominal:      General: Bowel sounds are normal. There is no distension. Palpations: Abdomen is soft. There is no mass. Tenderness: There is abdominal tenderness. There is no guarding or rebound.       Comments: Diffuse abdominal Detected    Influenza B by PCR Not Detected Not Detected    Mycoplasma pneumoniae by PCR Not Detected Not Detected    Parainfluenza Virus 1 by PCR Not Detected Not Detected    Parainfluenza Virus 2 by PCR Not Detected Not Detected    Parainfluenza Virus 3 by PCR Not Detected Not Detected    Parainfluenza Virus 4 by PCR Not Detected Not Detected    Respiratory Syncytial Virus by PCR Not Detected Not Detected   Troponin   Result Value Ref Range    Troponin <0.01 0.00 - 0.03 ng/mL   Brain Natriuretic Peptide   Result Value Ref Range    Pro-BNP 10,289 (H) 0 - 450 pg/mL   Lactic Acid, Plasma   Result Value Ref Range    Lactic Acid 1.6 0.5 - 2.2 mmol/L   CBC Auto Differential   Result Value Ref Range    WBC 10.3 4.5 - 11.5 E9/L    RBC 4.90 3.80 - 5.80 E12/L    Hemoglobin 16.0 12.5 - 16.5 g/dL    Hematocrit 49.1 37.0 - 54.0 %    .2 (H) 80.0 - 99.9 fL    MCH 32.7 26.0 - 35.0 pg    MCHC 32.6 32.0 - 34.5 %    RDW 13.4 11.5 - 15.0 fL    Platelets 476 058 - 632 E9/L    MPV 11.7 7.0 - 12.0 fL    Neutrophils % 86.8 (H) 43.0 - 80.0 %    Immature Granulocytes % 0.6 0.0 - 5.0 %    Lymphocytes % 7.8 (L) 20.0 - 42.0 %    Monocytes % 4.3 2.0 - 12.0 %    Eosinophils % 0.3 0.0 - 6.0 %    Basophils % 0.2 0.0 - 2.0 %    Neutrophils Absolute 8.94 (H) 1.80 - 7.30 E9/L    Immature Granulocytes # 0.06 E9/L    Lymphocytes Absolute 0.80 (L) 1.50 - 4.00 E9/L    Monocytes Absolute 0.44 0.10 - 0.95 E9/L    Eosinophils Absolute 0.03 (L) 0.05 - 0.50 E9/L    Basophils Absolute 0.02 0.00 - 0.20 E9/L   Comprehensive Metabolic Panel w/ Reflex to MG   Result Value Ref Range    Sodium 145 132 - 146 mmol/L    Potassium reflex Magnesium 4.3 3.5 - 5.0 mmol/L    Chloride 104 98 - 107 mmol/L    CO2 30 (H) 22 - 29 mmol/L    Anion Gap 11 7 - 16 mmol/L    Glucose 87 74 - 99 mg/dL    BUN 25 (H) 8 - 23 mg/dL    CREATININE 1.0 0.7 - 1.2 mg/dL    GFR Non-African American >60 >=60 mL/min/1.73    GFR African American >60     Calcium 9.1 8.6 - 10.2 mg/dL    Total Protein 6.9 6.4 - 8.3 g/dL    Alb 3.2 (L) 3.5 - 5.2 g/dL    Total Bilirubin 0.4 0.0 - 1.2 mg/dL    Alkaline Phosphatase 81 40 - 129 U/L    ALT 18 0 - 40 U/L    AST 47 (H) 0 - 39 U/L   Lipase   Result Value Ref Range    Lipase 68 (H) 13 - 60 U/L   Urinalysis   Result Value Ref Range    Color, UA Straw Straw/Yellow    Clarity, UA Clear Clear    Glucose, Ur Negative Negative mg/dL    Bilirubin Urine Negative Negative    Ketones, Urine Negative Negative mg/dL    Specific Gravity, UA 1.015 1.005 - 1.030    Blood, Urine Negative Negative    pH, UA 6.5 5.0 - 9.0    Protein, UA Negative Negative mg/dL    Urobilinogen, Urine 0.2 <2.0 E.U./dL    Nitrite, Urine Negative Negative    Leukocyte Esterase, Urine Negative Negative   Procalcitonin   Result Value Ref Range    Procalcitonin 0.06 0.00 - 0.08 ng/mL   Fibrinogen   Result Value Ref Range    Fibrinogen >700 (H) 225 - 540 mg/dL   APTT   Result Value Ref Range    aPTT 30.5 24.5 - 35.1 sec   BUN   Result Value Ref Range    BUN 23 8 - 23 mg/dL   CBC Auto Differential   Result Value Ref Range    WBC 9.8 4.5 - 11.5 E9/L    RBC 5.03 3.80 - 5.80 E12/L    Hemoglobin 16.6 (H) 12.5 - 16.5 g/dL    Hematocrit 50.1 37.0 - 54.0 %    MCV 99.6 80.0 - 99.9 fL    MCH 33.0 26.0 - 35.0 pg    MCHC 33.1 32.0 - 34.5 %    RDW 13.7 11.5 - 15.0 fL    Platelets 613 723 - 755 E9/L    MPV 11.6 7.0 - 12.0 fL    Neutrophils % 91.3 (H) 43.0 - 80.0 %    Lymphocytes % 6.1 (L) 20.0 - 42.0 %    Monocytes % 1.7 (L) 2.0 - 12.0 %    Eosinophils % 0.0 0.0 - 6.0 %    Basophils % 0.0 0.0 - 2.0 %    Neutrophils Absolute 8.92 (H) 1.80 - 7.30 E9/L    Lymphocytes Absolute 0.69 (L) 1.50 - 4.00 E9/L    Monocytes Absolute 0.20 0.10 - 0.95 E9/L    Eosinophils Absolute 0.00 (L) 0.05 - 0.50 E9/L    Basophils Absolute 0.00 0.00 - 0.20 E9/L    Atypical Lymphocytes Relative 0.9 0.0 - 4.0 %    nRBC 0.9 /100 WBC    Polychromasia 1+    Creatinine, Serum   Result Value Ref Range    CREATININE 1.1 0.7 - 1.2 mg/dL    GFR Non- American >60 >=60 mL/min/1.73    GFR African American >60    D-Dimer, Quantitative   Result Value Ref Range    D-Dimer, Quant 502 ng/mL DDU   Hepatic Function Panel   Result Value Ref Range    Total Protein 7.3 6.4 - 8.3 g/dL    Alb 3.5 3.5 - 5.2 g/dL    Alkaline Phosphatase 86 40 - 129 U/L    ALT 16 0 - 40 U/L    AST 41 (H) 0 - 39 U/L    Total Bilirubin 0.6 0.0 - 1.2 mg/dL    Bilirubin, Direct 0.2 0.0 - 0.3 mg/dL    Bilirubin, Indirect 0.4 0.0 - 1.0 mg/dL   Protime-INR   Result Value Ref Range    Protime 14.9 (H) 9.3 - 12.4 sec    INR 1.3    Lactate dehydrogenase   Result Value Ref Range     (H) 135 - 225 U/L   Ferritin   Result Value Ref Range    Ferritin 842 ng/mL   CK   Result Value Ref Range    Total CK 45 20 - 200 U/L   Troponin   Result Value Ref Range    Troponin <0.01 0.00 - 0.03 ng/mL   EKG 12 Lead   Result Value Ref Range    Ventricular Rate 77 BPM    Atrial Rate 53 BPM    QRS Duration 200 ms    Q-T Interval 474 ms    QTc Calculation (Bazett) 536 ms    R Axis -75 degrees    T Axis 84 degrees       Radiology:  CTA PULMONARY W CONTRAST   Final Result   Limited study due to some motion in the chest.  There is no obvious central   pulmonary emboli. Extensive bilateral predominantly peripheral ground-glass opacities and   infiltrates compatible with Covid-19 pattern. Cardiomegaly and significant coronary arterial vascular plaque      CT ABDOMEN PELVIS W IV CONTRAST Additional Contrast? None   Final Result   Limited study due to some motion in the chest.  There is no obvious central   pulmonary emboli. Extensive bilateral predominantly peripheral ground-glass opacities and   infiltrates compatible with Covid-19 pattern.    Cardiomegaly and significant coronary arterial vascular plaque               --------------------------------------------- PAST HISTORY ---------------------------------------------  Past Medical History:  has a past medical history of Apnea, sleep, Atrial fibrillation ([facility] [address] Range    Troponin <0.01 0.00 - 0.03 ng/mL   Brain Natriuretic Peptide   Result Value Ref Range    Pro-BNP 10,289 (H) 0 - 450 pg/mL   Lactic Acid, Plasma   Result Value Ref Range    Lactic Acid 1.6 0.5 - 2.2 mmol/L   CBC Auto Differential   Result Value Ref Range    WBC 10.3 4.5 - 11.5 E9/L    RBC 4.90 3.80 - 5.80 E12/L    Hemoglobin 16.0 12.5 - 16.5 g/dL    Hematocrit 49.1 37.0 - 54.0 %    .2 (H) 80.0 - 99.9 fL    MCH 32.7 26.0 - 35.0 pg    MCHC 32.6 32.0 - 34.5 %    RDW 13.4 11.5 - 15.0 fL    Platelets 535 952 - 268 E9/L    MPV 11.7 7.0 - 12.0 fL    Neutrophils % 86.8 (H) 43.0 - 80.0 %    Immature Granulocytes % 0.6 0.0 - 5.0 %    Lymphocytes % 7.8 (L) 20.0 - 42.0 %    Monocytes % 4.3 2.0 - 12.0 %    Eosinophils % 0.3 0.0 - 6.0 %    Basophils % 0.2 0.0 - 2.0 %    Neutrophils Absolute 8.94 (H) 1.80 - 7.30 E9/L    Immature Granulocytes # 0.06 E9/L    Lymphocytes Absolute 0.80 (L) 1.50 - 4.00 E9/L    Monocytes Absolute 0.44 0.10 - 0.95 E9/L    Eosinophils Absolute 0.03 (L) 0.05 - 0.50 E9/L    Basophils Absolute 0.02 0.00 - 0.20 E9/L   Comprehensive Metabolic Panel w/ Reflex to MG   Result Value Ref Range    Sodium 145 132 - 146 mmol/L    Potassium reflex Magnesium 4.3 3.5 - 5.0 mmol/L    Chloride 104 98 - 107 mmol/L    CO2 30 (H) 22 - 29 mmol/L    Anion Gap 11 7 - 16 mmol/L    Glucose 87 74 - 99 mg/dL    BUN 25 (H) 8 - 23 mg/dL    CREATININE 1.0 0.7 - 1.2 mg/dL    GFR Non-African American >60 >=60 mL/min/1.73    GFR African American >60     Calcium 9.1 8.6 - 10.2 mg/dL    Total Protein 6.9 6.4 - 8.3 g/dL    Alb 3.2 (L) 3.5 - 5.2 g/dL    Total Bilirubin 0.4 0.0 - 1.2 mg/dL    Alkaline Phosphatase 81 40 - 129 U/L    ALT 18 0 - 40 U/L    AST 47 (H) 0 - 39 U/L   Lipase   Result Value Ref Range    Lipase 68 (H) 13 - 60 U/L   Urinalysis   Result Value Ref Range    Color, UA Straw Straw/Yellow    Clarity, UA Clear Clear    Glucose, Ur Negative Negative mg/dL    Bilirubin Urine Negative Negative    Ketones, Urine Negative Negative mg/dL    Specific Gravity, UA 1.015 1.005 - 1.030    Blood, Urine Negative Negative    pH, UA 6.5 5.0 - 9.0    Protein, UA Negative Negative mg/dL    Urobilinogen, Urine 0.2 <2.0 E.U./dL    Nitrite, Urine Negative Negative    Leukocyte Esterase, Urine Negative Negative   Procalcitonin   Result Value Ref Range    Procalcitonin 0.06 0.00 - 0.08 ng/mL   Fibrinogen   Result Value Ref Range    Fibrinogen >700 (H) 225 - 540 mg/dL   APTT   Result Value Ref Range    aPTT 30.5 24.5 - 35.1 sec   BUN   Result Value Ref Range    BUN 23 8 - 23 mg/dL   CBC Auto Differential   Result Value Ref Range    WBC 9.8 4.5 - 11.5 E9/L    RBC 5.03 3.80 - 5.80 E12/L    Hemoglobin 16.6 (H) 12.5 - 16.5 g/dL    Hematocrit 50.1 37.0 - 54.0 %    MCV 99.6 80.0 - 99.9 fL    MCH 33.0 26.0 - 35.0 pg    MCHC 33.1 32.0 - 34.5 %    RDW 13.7 11.5 - 15.0 fL    Platelets 903 222 - 786 E9/L    MPV 11.6 7.0 - 12.0 fL    Neutrophils % 91.3 (H) 43.0 - 80.0 %    Lymphocytes % 6.1 (L) 20.0 - 42.0 %    Monocytes % 1.7 (L) 2.0 - 12.0 %    Eosinophils % 0.0 0.0 - 6.0 %    Basophils % 0.0 0.0 - 2.0 %    Neutrophils Absolute 8.92 (H) 1.80 - 7.30 E9/L    Lymphocytes Absolute 0.69 (L) 1.50 - 4.00 E9/L    Monocytes Absolute 0.20 0.10 - 0.95 E9/L    Eosinophils Absolute 0.00 (L) 0.05 - 0.50 E9/L    Basophils Absolute 0.00 0.00 - 0.20 E9/L    Atypical Lymphocytes Relative 0.9 0.0 - 4.0 %    nRBC 0.9 /100 WBC    Polychromasia 1+    Creatinine, Serum   Result Value Ref Range    CREATININE 1.1 0.7 - 1.2 mg/dL    GFR Non-African American >60 >=60 mL/min/1.73    GFR African American >60    D-Dimer, Quantitative   Result Value Ref Range    D-Dimer, Quant 502 ng/mL DDU   Hepatic Function Panel   Result Value Ref Range    Total Protein 7.3 6.4 - 8.3 g/dL    Alb 3.5 3.5 - 5.2 g/dL    Alkaline Phosphatase 86 40 - 129 U/L    ALT 16 0 - 40 U/L    AST 41 (H) 0 - 39 U/L    Total Bilirubin 0.6 0.0 - 1.2 mg/dL    Bilirubin, Direct 0.2 0.0 - 0.3 mg/dL    Bilirubin, Indirect 0.4 0.0 - 1.0 mg/dL   Protime-INR   Result Value Ref Range    Protime 14.9 (H) 9.3 - 12.4 sec    INR 1.3    Lactate dehydrogenase   Result Value Ref Range     (H) 135 - 225 U/L   Ferritin   Result Value Ref Range    Ferritin 842 ng/mL   CK   Result Value Ref Range    Total CK 45 20 - 200 U/L   Troponin   Result Value Ref Range    Troponin <0.01 0.00 - 0.03 ng/mL   EKG 12 Lead   Result Value Ref Range    Ventricular Rate 77 BPM    Atrial Rate 53 BPM    QRS Duration 200 ms    Q-T Interval 474 ms    QTc Calculation (Bazett) 536 ms    R Axis -75 degrees    T Axis 84 degrees       RADIOLOGY:  CTA PULMONARY W CONTRAST   Final Result   Limited study due to some motion in the chest.  There is no obvious central   pulmonary emboli. Extensive bilateral predominantly peripheral ground-glass opacities and   infiltrates compatible with Covid-19 pattern. Cardiomegaly and significant coronary arterial vascular plaque      CT ABDOMEN PELVIS W IV CONTRAST Additional Contrast? None   Final Result   Limited study due to some motion in the chest.  There is no obvious central   pulmonary emboli. Extensive bilateral predominantly peripheral ground-glass opacities and   infiltrates compatible with Covid-19 pattern. Cardiomegaly and significant coronary arterial vascular plaque                ------------------------- NURSING NOTES AND VITALS REVIEWED ---------------------------  Date / Time Roomed:  11/21/2020  9:15 AM  ED Bed Assignment:  07/07    The nursing notes within the ED encounter and vital signs as below have been reviewed.      Patient Vitals for the past 24 hrs:   BP Temp Temp src Pulse Resp SpO2 Height Weight   11/21/20 2116 (!) 155/86 -- -- 76 16 94 % -- --   11/21/20 2051 (!) 155/85 -- -- 81 16 93 % -- --   11/21/20 2000 (!) 185/94 -- -- 71 16 96 % -- --   11/21/20 1900 (!) 157/87 98.9 °F (37.2 °C) -- 70 18 98 % -- --   11/21/20 1645 (!) 151/83 98.4 °F (36.9 °C) -- 86 20 96 % -- --   11/21/20 1430 (!) 177/103 98.8 °F (37.1 °C) -- 72 20 95 % -- --   11/21/20 1315 (!) 191/98 98.6 °F (37 °C) -- 73 20 98 % -- --   11/21/20 1215 (!) 169/97 97.8 °F (36.6 °C) -- 71 20 96 % -- --   11/21/20 1045 (!) 150/89 98.6 °F (37 °C) -- 73 (!) 80 96 % -- --   11/21/20 0930 -- -- -- -- -- 96 % -- --   11/21/20 0929 (!) 150/89 97.9 °F (36.6 °C) Oral 80 20 (!) 84 % 5' 10\" (1.778 m) 260 lb (117.9 kg)       Oxygen Saturation Interpretation: Abnormal and Improved after treatment    ------------------------------------------ PROGRESS NOTES ------------------------------------------  Re-evaluation(s):  Time: 5839  Patients symptoms show no change  Repeat physical examination is not changed    Counseling:  I have spoken with the patient and discussed todays results, in addition to providing specific details for the plan of care and counseling regarding the diagnosis and prognosis. Their questions are answered at this time and they are agreeable with the plan of admission.    --------------------------------- ADDITIONAL PROVIDER NOTES ---------------------------------  Consultations:  Time: 9278. Spoke with Dr. Mat Duval. Discussed case. They will admit the patient. This patient's ED course included: a personal history and physicial examination, re-evaluation prior to disposition, multiple bedside re-evaluations, IV medications, cardiac monitoring, continuous pulse oximetry and complex medical decision making and emergency management    This patient has remained hemodynamically stable during their ED course. Diagnosis:  1. Acute hypoxemic respiratory failure due to COVID-19 [facility])        Disposition:  Patient's disposition: Admit to telemetry  Patient's condition is stable.                  Pamela Lazaro MD  Resident  11/21/20 5586

## 2020-11-22 VITALS
TEMPERATURE: 97.7 F | BODY MASS INDEX: 37.53 KG/M2 | DIASTOLIC BLOOD PRESSURE: 82 MMHG | OXYGEN SATURATION: 92 % | HEIGHT: 70 IN | HEART RATE: 78 BPM | SYSTOLIC BLOOD PRESSURE: 163 MMHG | RESPIRATION RATE: 18 BRPM | WEIGHT: 262.13 LBS

## 2020-11-22 LAB
ALBUMIN SERPL-MCNC: 3.3 G/DL (ref 3.5–5.2)
ALP BLD-CCNC: 88 U/L (ref 40–129)
ALT SERPL-CCNC: 18 U/L (ref 0–40)
ANION GAP SERPL CALCULATED.3IONS-SCNC: 12 MMOL/L (ref 7–16)
AST SERPL-CCNC: 32 U/L (ref 0–39)
BASOPHILS ABSOLUTE: 0 E9/L (ref 0–0.2)
BASOPHILS RELATIVE PERCENT: 0 % (ref 0–2)
BILIRUB SERPL-MCNC: 0.5 MG/DL (ref 0–1.2)
BUN BLDV-MCNC: 24 MG/DL (ref 8–23)
CALCIUM SERPL-MCNC: 9.1 MG/DL (ref 8.6–10.2)
CHLORIDE BLD-SCNC: 100 MMOL/L (ref 98–107)
CO2: 31 MMOL/L (ref 22–29)
CREAT SERPL-MCNC: 1 MG/DL (ref 0.7–1.2)
EKG ATRIAL RATE: 53 BPM
EKG Q-T INTERVAL: 474 MS
EKG QRS DURATION: 200 MS
EKG QTC CALCULATION (BAZETT): 536 MS
EKG R AXIS: -75 DEGREES
EKG T AXIS: 84 DEGREES
EKG VENTRICULAR RATE: 77 BPM
EOSINOPHILS ABSOLUTE: 0 E9/L (ref 0.05–0.5)
EOSINOPHILS RELATIVE PERCENT: 0 % (ref 0–6)
GFR AFRICAN AMERICAN: >60
GFR NON-AFRICAN AMERICAN: >60 ML/MIN/1.73
GLUCOSE BLD-MCNC: 107 MG/DL (ref 74–99)
HCT VFR BLD CALC: 54.1 % (ref 37–54)
HEMOGLOBIN: 17.7 G/DL (ref 12.5–16.5)
LYMPHOCYTES ABSOLUTE: 0.25 E9/L (ref 1.5–4)
LYMPHOCYTES RELATIVE PERCENT: 2.6 % (ref 20–42)
MCH RBC QN AUTO: 32.7 PG (ref 26–35)
MCHC RBC AUTO-ENTMCNC: 32.7 % (ref 32–34.5)
MCV RBC AUTO: 99.8 FL (ref 80–99.9)
METAMYELOCYTES RELATIVE PERCENT: 0.9 % (ref 0–1)
MONOCYTES ABSOLUTE: 0.25 E9/L (ref 0.1–0.95)
MONOCYTES RELATIVE PERCENT: 2.6 % (ref 2–12)
MYELOCYTE PERCENT: 0.9 % (ref 0–0)
NEUTROPHILS ABSOLUTE: 7.98 E9/L (ref 1.8–7.3)
NEUTROPHILS RELATIVE PERCENT: 93 % (ref 43–80)
NUCLEATED RED BLOOD CELLS: 0 /100 WBC
PDW BLD-RTO: 13.6 FL (ref 11.5–15)
PLATELET # BLD: 221 E9/L (ref 130–450)
PMV BLD AUTO: 11.3 FL (ref 7–12)
POTASSIUM REFLEX MAGNESIUM: 4.1 MMOL/L (ref 3.5–5)
RBC # BLD: 5.42 E12/L (ref 3.8–5.8)
SODIUM BLD-SCNC: 143 MMOL/L (ref 132–146)
TOTAL PROTEIN: 7.8 G/DL (ref 6.4–8.3)
WBC # BLD: 8.4 E9/L (ref 4.5–11.5)

## 2020-11-22 PROCEDURE — 99239 HOSP IP/OBS DSCHRG MGMT >30: CPT | Performed by: FAMILY MEDICINE

## 2020-11-22 PROCEDURE — 2580000003 HC RX 258: Performed by: CLINICAL NURSE SPECIALIST

## 2020-11-22 PROCEDURE — APPSS45 APP SPLIT SHARED TIME 31-45 MINUTES: Performed by: CLINICAL NURSE SPECIALIST

## 2020-11-22 PROCEDURE — 6370000000 HC RX 637 (ALT 250 FOR IP): Performed by: INTERNAL MEDICINE

## 2020-11-22 PROCEDURE — 36415 COLL VENOUS BLD VENIPUNCTURE: CPT

## 2020-11-22 PROCEDURE — 83520 IMMUNOASSAY QUANT NOS NONAB: CPT

## 2020-11-22 PROCEDURE — 85025 COMPLETE CBC W/AUTO DIFF WBC: CPT

## 2020-11-22 PROCEDURE — 80053 COMPREHEN METABOLIC PANEL: CPT

## 2020-11-22 PROCEDURE — 2700000000 HC OXYGEN THERAPY PER DAY

## 2020-11-22 PROCEDURE — 6370000000 HC RX 637 (ALT 250 FOR IP): Performed by: FAMILY MEDICINE

## 2020-11-22 PROCEDURE — 6370000000 HC RX 637 (ALT 250 FOR IP): Performed by: CLINICAL NURSE SPECIALIST

## 2020-11-22 RX ORDER — ZINC SULFATE 50(220)MG
50 CAPSULE ORAL DAILY
Qty: 10 CAPSULE | Refills: 0 | Status: ON HOLD | OUTPATIENT
Start: 2020-11-22 | End: 2022-10-11

## 2020-11-22 RX ORDER — BUMETANIDE 1 MG/1
1 TABLET ORAL 2 TIMES DAILY
Qty: 30 TABLET | Refills: 3 | Status: SHIPPED | OUTPATIENT
Start: 2020-11-22

## 2020-11-22 RX ORDER — ASCORBIC ACID 500 MG
1000 TABLET ORAL DAILY
Qty: 30 TABLET | Refills: 3 | Status: SHIPPED | OUTPATIENT
Start: 2020-11-22 | End: 2022-10-11

## 2020-11-22 RX ORDER — BUMETANIDE 1 MG/1
1 TABLET ORAL 2 TIMES DAILY
Status: DISCONTINUED | OUTPATIENT
Start: 2020-11-22 | End: 2020-11-22 | Stop reason: HOSPADM

## 2020-11-22 RX ORDER — DEXAMETHASONE 6 MG/1
6 TABLET ORAL 2 TIMES DAILY WITH MEALS
Qty: 20 TABLET | Refills: 0 | Status: SHIPPED | OUTPATIENT
Start: 2020-11-22 | End: 2020-12-02

## 2020-11-22 RX ORDER — BUMETANIDE 0.25 MG/ML
1 INJECTION, SOLUTION INTRAMUSCULAR; INTRAVENOUS 2 TIMES DAILY
Status: DISCONTINUED | OUTPATIENT
Start: 2020-11-22 | End: 2020-11-22

## 2020-11-22 RX ADMIN — SPIRONOLACTONE 25 MG: 25 TABLET ORAL at 09:05

## 2020-11-22 RX ADMIN — SODIUM CHLORIDE, PRESERVATIVE FREE 10 ML: 5 INJECTION INTRAVENOUS at 09:06

## 2020-11-22 RX ADMIN — Medication 1 CAPSULE: at 09:05

## 2020-11-22 RX ADMIN — METOPROLOL SUCCINATE 100 MG: 25 TABLET, EXTENDED RELEASE ORAL at 09:05

## 2020-11-22 RX ADMIN — APIXABAN 5 MG: 5 TABLET, FILM COATED ORAL at 09:05

## 2020-11-22 RX ADMIN — PANTOPRAZOLE SODIUM 40 MG: 40 TABLET, DELAYED RELEASE ORAL at 06:00

## 2020-11-22 RX ADMIN — BUMETANIDE 1 MG: 1 TABLET ORAL at 09:04

## 2020-11-22 RX ADMIN — CYANOCOBALAMIN TAB 1000 MCG 1000 MCG: 1000 TAB at 09:05

## 2020-11-22 RX ADMIN — LISINOPRIL 20 MG: 20 TABLET ORAL at 09:05

## 2020-11-22 RX ADMIN — CETIRIZINE HYDROCHLORIDE 10 MG: 10 TABLET, FILM COATED ORAL at 09:05

## 2020-11-22 ASSESSMENT — PAIN SCALES - GENERAL
PAINLEVEL_OUTOF10: 0
PAINLEVEL_OUTOF10: 0

## 2020-11-22 NOTE — ED NOTES
Patient unable to provide stool sample at this time; instructed to use call light when able to.      Cristiano Rice RN  11/21/20 3113

## 2020-11-22 NOTE — PROGRESS NOTES
Pt discharged home. Instructions given, pt verbalized understanding. Pt's home oxygen concentrator here, explained to pt how to use, verbalized understanding. IV removed, tele pack monitor removed, cleaned, and placed in storage room. [facility] Significant other here at this time waiting.

## 2020-11-22 NOTE — CONSULTS
The [address] [facility] CONSULT-[facility] CARDIOLOGY    Name: Vikas Nguyen    Age: [de-identified] y.o. Date of Admission: 11/21/2020  9:15 AM    Date of Service: 11/22/2020    Reason for Consultation: Acute on chronic systolic heart failure    Referring Physician: [facility]    History of Present Illness: The patient is a [de-identified]y.o. year old male admitted with worsening shortness of breath on activity he could tolerate in the past over the last several days and found to have diffuse groundglass opacities due to COVID-19. Also tested positive for COVID-19 in ER. This morning, he states he feels \"like a brand-new person. \"  Much better and with no further dyspnea. Does not believe the testing that states he has COVID-19. Past Medical History:   Hypertension, hyperlipidemia, permanent atrial fibrillation post AV node ablation in the past with permanent single-chamber pacemaker. On anticoagulation. Remote PCI of unknown vessel with no ischemia on Lexiscan June 2018. Echo done late 2019 revealed LVEF 35%, moderate mitral regurgitation and pulmonary hypertension. Echocardiogram completed September 4, 2019 revealed moderately severe left ventricular concentric hypertrophy, LVEF 35%, indeterminate diastolic function, moderate mitral regurgitation, mild tricuspid regurgitation and pulmonary hypertension. Review of Systems:     Constitutional: No fever, chills, sweats  Cardiac: As per HPI  Pulmonary: As per HPI  HEENT: No visual disturbances or difficult swallowing  GI: No nausea, vomiting, diarrhea, abdominal pain, rectal bleeding  : No dysuria or hematuria  Endocrine: No excessive thirst, heat or cold intolerance. Musculoskeletal: Joint pain but no muscle aches. No claudication  Skin: No skin breakdown or rashes  Neuro: No headache, confusion, or seizures  Psych: No depression, anxiety      Family History:  History reviewed. No pertinent family history.     Social History:  Social History  cetirizine (ZYRTEC) tablet 10 mg  10 mg Oral Daily Erik Roman, APRN - CNS        furosemide (LASIX) tablet 40 mg  40 mg Oral BID Erik Owens, APRN - CNS        lisinopril (PRINIVIL;ZESTRIL) tablet 20 mg  20 mg Oral Daily Erik Roman, APRN - CNS        metoprolol succinate (TOPROL XL) extended release tablet 100 mg  100 mg Oral Daily Erik Roman, APRN - CNS        pantoprazole (PROTONIX) tablet 40 mg  40 mg Oral QAM AC Erik Owens, APRN - CNS   40 mg at 11/22/20 0600    vitamin B-12 (CYANOCOBALAMIN) tablet 1,000 mcg  1,000 mcg Oral Daily Erik Roman, APRN - CNS        sodium chloride flush 0.9 % injection 10 mL  10 mL Intravenous 2 times per day Erik Roman, APRN - CNS   10 mL at 11/21/20 2311    sodium chloride flush 0.9 % injection 10 mL  10 mL Intravenous PRN Erik Roman, APRN - CNS        acetaminophen (TYLENOL) tablet 650 mg  650 mg Oral Q6H PRN Erik Roman, APRN - CNS        Or    acetaminophen (TYLENOL) suppository 650 mg  650 mg Rectal Q6H PRN Erik Roman, APRN - CNS        polyethylene glycol (GLYCOLAX) packet 17 g  17 g Oral Daily PRN Erik Roman, APRN - CNS        prochlorperazine (COMPAZINE) injection 10 mg  10 mg Intravenous Q6H PRN Erik Roman, APRN - CNS        perflutren lipid microspheres (DEFINITY) injection 1.65 mg  1.5 mL Intravenous ONCE PRN Matthew Mark MD        spironolactone (ALDACTONE) tablet 25 mg  25 mg Oral Daily Matthew Mark MD        lactobacillus (CULTURELLE) capsule 1 capsule  1 capsule Oral Daily Matthew Mark MD        bismuth subsalicylate (PEPTO BISMOL) 525 MG/15ML suspension 30 mL  30 mL Oral Q6H PRN Matthew Mark MD             Physical Exam:  BP (!) 146/90   Pulse 70   Temp 97.7 °F (36.5 °C) (Oral)   Resp 16   Ht 5' 10\" (1.778 m)   Wt 262 lb 2 oz (118.9 kg)   SpO2 95%   BMI 37.61 kg/m²   Weight change:    Wt Readings from Last 3 Encounters:   11/22/20 262 lb 2 oz (118.9 kg)   08/16/20 258 lb (117 kg)   03/11/20 260 lb 11.2 oz (118.3 kg)         General: Awake, alert, oriented x3, no acute distress  HEENT: Unremarkable  Neck: No JVD or bruits. Cardiac: Regular rate and rhythm, normal S1 and S2, no extra heart sounds, murmurs, heaves, thrills  Resp: Lungs clear bilaterally without wheezing or crackles. No accessory muscle use or retraction  Abdomen: soft, nontender, nondistended, no gross organomegaly or mass  Skin: Warm and dry, no cyanosis. Trace bilateral lower extremity edema. Musculoskeletal: normal tone and strength in the upper and lower extremities bilaterally  Neuro: Grossly unremarkable  Psych: Cooperative, and normal affect    Intake/Output:  No intake or output data in the 24 hours ending 11/22/20 0654  No intake/output data recorded.     Laboratory Tests:  Lab Results   Component Value Date    CREATININE 1.0 11/22/2020    BUN 24 (H) 11/22/2020     11/22/2020    K 4.1 11/22/2020     11/22/2020    CO2 31 (H) 11/22/2020     Recent Labs     11/21/20  1658   CKTOTAL 45     No results found for: BNP  Lab Results   Component Value Date    WBC 8.4 11/22/2020    RBC 5.42 11/22/2020    HGB 17.7 11/22/2020    HCT 54.1 11/22/2020    MCV 99.8 11/22/2020    MCH 32.7 11/22/2020    MCHC 32.7 11/22/2020    RDW 13.6 11/22/2020     11/22/2020    MPV 11.3 11/22/2020     Recent Labs     11/21/20  1043 11/21/20  1658 11/22/20  0335   ALKPHOS 81 86 88   ALT 18 16 18   AST 47* 41* 32   PROT 6.9 7.3 7.8   BILITOT 0.4 0.6 0.5   BILIDIR  --  0.2  --    LABALBU 3.2* 3.5 3.3*     Lab Results   Component Value Date    MG 1.7 09/06/2019     Lab Results   Component Value Date    PROTIME 14.9 11/21/2020    PROTIME 11.4 10/31/2010    INR 1.3 11/21/2020     Lab Results   Component Value Date    TSH 0.391 11/21/2020     No components found for: CHLPL  Lab Results   Component Value Date    TRIG 119 09/04/2019     Lab Results   Component Value Date    HDL 64 09/04/2019     Lab Results   Component Value Date    LDLCALC 59 09/04/2019     Lab Results   Component Value Date    INR 1.3 11/21/2020       Admission ECG reviewed by me revealed AF with ventricular paced rhythm. ASSESSMENT / PLAN:    #1.  Shortness of breath-probably due to combination of COVID-19 pneumonia and acute systolic heart failure. Echocardiogram ordered by primary care physician but he had echo around 1 year ago. Do not want to expose echo staff to COVID 19. He appears euvolemic to me with clear lungs on examination and trace bilateral lower extremity edema. On both oral bumetanide and furosemide. I feel he can stop furosemide and continue with bumetanide 1 mg orally twice daily. Okay from my standpoint for discharge when okay with PCP. Sign off and have arranged follow-up in my office in 4 weeks. #2.  Secondary cardiomyopathy-continue beta-blocker and ACE inhibitor for cardioprotection. #3. Hypertension-blood pressure borderline elevated but have made no changes pending diuresis. #4.  Hyperlipidemia-continue statin. #5.  Permanent atrial fibrillation and pacemaker-no acute issues. #6.  Anticoagulation status-continue Eliquis. #7.  As above, okay for discharge from my standpoint and he already has follow-up appointment arranged.     Electronically signed by Simi Zayas MD on 11/22/2020 at 6:54 AM

## 2020-11-22 NOTE — ED NOTES
Patient unable to provide stool sample at this time; instructed to use call light when able to.      Eloy Hernandez RN  11/21/20 9905

## 2020-11-22 NOTE — PROGRESS NOTES
non-tender, non-distended, normal bowel sounds, no masses or organomegaly  Extremities: no cyanosis, no clubbing and no edema  Neurologic: no cranial nerve deficit and speech normal  hl      Recent Labs     11/21/20  1043 11/21/20  1658 11/22/20  0335     --  143   K 4.3  --  4.1     --  100   CO2 30*  --  31*   BUN 25* 23 24*   CREATININE 1.0 1.1 1.0   GLUCOSE 87  --  107*   CALCIUM 9.1  --  9.1       Recent Labs     11/21/20  1043 11/21/20  1658 11/22/20  0335   WBC 10.3 9.8 8.4   RBC 4.90 5.03 5.42   HGB 16.0 16.6* 17.7*   HCT 49.1 50.1 54.1*   .2* 99.6 99.8   MCH 32.7 33.0 32.7   MCHC 32.6 33.1 32.7   RDW 13.4 13.7 13.6    214 221   MPV 11.7 11.6 11.3       Lactic acid 1.6  Lipase 68  Troponin negative     Radiology:   CTA PULMONARY W CONTRAST   Final Result   Limited study due to some motion in the chest.  There is no obvious central   pulmonary emboli. Extensive bilateral predominantly peripheral ground-glass opacities and   infiltrates compatible with Covid-19 pattern. Cardiomegaly and significant coronary arterial vascular plaque       CT ABDOMEN PELVIS W IV CONTRAST Additional Contrast? None   Final Result   Limited study due to some motion in the chest.  There is no obvious central   pulmonary emboli. Extensive bilateral predominantly peripheral ground-glass opacities and   infiltrates compatible with Covid-19 pattern. Cardiomegaly and significant coronary arterial vascular plaque             Assessment:    Active Problems:    Acute respiratory failure due to COVID-19 [facility])    Atrial fibrillation (HCC)    Essential hypertension    Diarrhea of presumed infectious origin  Resolved Problems:    * No resolved hospital problems. *            PLAN:     1.   Acute hypoxic respiratory failure  -Room air saturation on admission was 84% he required 3 L nasal cannula to maintain And typically does not require oxygen  -Differential includes COVID-19 viral pneumonia, CHF exacerbation, bacterial pneumonia, fluid volume overload     -Patient received dose of Lasix in the ER  -Dexamethasone in the ER  -COVID-19 sent  -We will continue steroids  -Send COVID-19 labs, interleukin-1, ferritin, CRP, D-dimer, LDH, procalcitonin, baseline labs repeated. -Titrate oxygen     Atypical chest pain substernal tenderness -follow in the setting of acute hypoxic respiratory failure respiratory distress  -Cycle troponins negative thus far  -EKG           Abdominal pain  -CT abdomen unremarkable no acute findings. ,  Renal cyst noted. -Been having diarrhea cramps nausea vomiting for about a week  -Zofran, Phenergan  -Send stool for C. difficile           Diarrhea  -As mentioned above ongoing for at least a week he said 7-10 watery diarrhea today  Differential would include viral gastroenteritis, C. difficile, COVID-19  -Send stool for C. Difficile  -Supportive care              Nausea and vomiting  -As above  -Compazine in setting of prolonged QTC           Congestive heart failure [place] class I-acute on chronic combined heart failure  -Acute on chronic systolic heart failure  -Last 2D echocardiogram September 2019 with some left ventricular hypertrophy EF of 35%  -Home regimen includes Bumex, Lasix and Aldactone---we will review med rec  -Consult local cardiology Dr Elvia Avalos last consult   -Continue Lasix, Bumex, Aldactone for now with adjustments  -Received a dose of Lasix in the ER           Atrial fibrillation  -Continue home regimen of Eliquis and Toprol  -Monitor rhythm                 Hypertension-monitor vitals  Continue with lisinopril, Toprol, diuretics  -Patient also on ARB ?--- review med rec. 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   Discussion with patient and he understands he is making informed decision that could result in death        Code Status: full  DVT prophylaxis: Eliquis  Diet low-sodium    NOTE: This report was transcribed using voice recognition software. Every effort was made to ensure accuracy; however, inadvertent computerized transcription errors may be present.   Electronically signed by MANUEL Umanzor on 11/22/2020 at 9:11 AM

## 2020-11-22 NOTE — DISCHARGE SUMMARY
[facility] Physician Discharge Summary       Ella Gonzalez DO  [address] [facility] [facility] [address]  [phone]    In 1 week        Activity level: As tolerated     Dispo: Home    Condition on discharge: Patient wants to be discharged, still has ongoing hypoxia he was educated on importance of following medical advice but he wants to go home. Patient ID:  Sophia Yeh  [id number]  9 y.o.  1940    Admit date: 11/21/2020    Discharge date and time:  11/22/2020  2:01 PM    Admission Diagnoses: Active Problems:    Acute respiratory failure due to COVID-19 [facility])    Atrial fibrillation (HCC)    Essential hypertension    Diarrhea of presumed infectious origin  Resolved Problems:    * No resolved hospital problems. *      Discharge Diagnoses: Active Problems:    Acute respiratory failure due to COVID-19 [facility])    Atrial fibrillation (HCC)    Essential hypertension    Diarrhea of presumed infectious origin  Resolved Problems:    * No resolved hospital problems. *      Consults:  IP CONSULT TO CARDIOLOGY    Procedures:   No OR procedures    Hospital Course:   Patient Sophia Yeh is a [de-identified] y.o. presented with Acute respiratory failure due to COVID-19 ([address] [U07.1, J96.00]  Acute respiratory failure due to COVID-19 ([address] [U07.1, J96.00]  This is a 19-year-old  man with a past medical history of CHF, hypertension, morbid obesity, sleep apnea, atrial fibrillation, hypertension. Patient admitted yesterday afternoon he came in from home with a week of shortness of breath, abdominal pain, nausea and vomiting. As previously mentioned in my admission he is got a history of congestive heart failure. He does not require home oxygen in the ER he was hypoxic at 84% he had to be placed on 3 L nasal cannula CT the abdomen pelvis was negative for PE CTA showing bibasilar groundglass opacities.   COVID-19 test was positive today he was started on Decadron he did receive Lasix and IV fluids in Component  Value  Ref Range & Units  Status  Collected  Lab    Adenovirus by PCR  Not Detected  Not Detected  Final  11/21/2020 10:43 AM  [facility]    Bordetella parapertussis by PCR  Not Detected  Not Detected  Final  11/21/2020 10:43 AM  [facility]    Bordetella pertussis by PCR  Not Detected  Not Detected  Final  11/21/2020 10:43 AM  [facility]    Chlamydophilia pneumoniae by PCR  Not Detected  Not Detected  Final  11/21/2020 10:43 AM  [facility]    Coronavirus 229E by PCR  Not Detected  Not Detected  Final  11/21/2020 10:43 AM  [facility]    Coronavirus HKU1 by PCR  Not Detected  Not Detected  Final  11/21/2020 10:43 AM  [facility]    Coronavirus NL63 by PCR  Not Detected  Not Detected  Final  11/21/2020 10:43 AM  [facility]    Coronavirus OC43 by PCR  Not Detected  Not Detected  Final  11/21/2020 10:43 AM  [facility]    SARS-CoV-2, PCR  DETECTEDAbnormal    Not Detected  Final  11/21/2020 10:43 AM  [facility]        Labs 11/21/2020  CK total 45  Troponin 0 0.01  Ferritin 842    D-dimer 502  Fibrinogen 700  Calcitonin 0.06    No results for input(s): POCGLU in the last 72 hours. Imaging:  Ct Abdomen Pelvis W Iv Contrast Additional Contrast? None    Result Date: 11/21/2020  EXAMINATION: CTA OF THE CHEST; CT OF THE ABDOMEN AND PELVIS WITH CONTRAST 11/21/2020 12:35 pm TECHNIQUE: CTA of the chest was performed after the administration of intravenous contrast.  Multiplanar reformatted images are provided for review. MIP images are provided for review.  Dose modulation, iterative reconstruction, and/or weight based adjustment of the mA/kV was utilized to reduce the radiation dose to as low as reasonably achievable.; CT of the abdomen and pelvis was performed with the administration of intravenous contrast. Multiplanar reformatted images are provided for review. Dose modulation, iterative reconstruction, and/or weight based adjustment of the mA/kV was utilized to reduce the radiation dose to as low as reasonably achievable. COMPARISON: None HISTORY: ORDERING SYSTEM PROVIDED HISTORY: SOB TECHNOLOGIST PROVIDED HISTORY: Reason for exam:->SOB; ORDERING SYSTEM PROVIDED HISTORY: dairrhea TECHNOLOGIST PROVIDED HISTORY: Reason for exam:->dairrhea Additional Contrast?->None FINDINGS: Chest: Motion artifacts limit evaluation. There is some bibasilar hyperinflation. Mediastinum: No central pulmonary emboli are noted. Heart is moderately enlarged. There are small paratracheal lymph nodes. There is coronary arterial vascular plaque. Lungs/pleura: There are bilateral peripheral ground-glass opacities and infiltrative changes. These are most pronounced in the lower lobes and lingular region. There are no significant effusions. Soft Tissues/Bones: There are mild degenerative changes in the thoracic spine Abdomen/Pelvis: Organs: Liver and spleen are normal.  There are tiny gallstones. Pancreas is normal.  There are small right renal cysts. There is a dominant left renal cyst measuring 6.8 cm. GI/Bowel: Unremarkable Pelvis: Bladder is normal. Peritoneum/Retroperitoneum: No abdominal or pelvic lymphadenopathy or fluid collections are noted. Aorta has no acute process. Bones/Soft Tissues: There is moderate degenerative changes in lower lumbar spine. Limited study due to some motion in the chest.  There is no obvious central pulmonary emboli. Extensive bilateral predominantly peripheral ground-glass opacities and infiltrates compatible with Covid-19 pattern.  Cardiomegaly and significant coronary arterial vascular plaque    Cta Pulmonary W Contrast    Result Date: 11/21/2020  EXAMINATION: CTA OF THE CHEST; CT OF THE ABDOMEN AND PELVIS WITH CONTRAST 11/21/2020 12:35 pm TECHNIQUE: CTA of the chest was performed after the Extensive bilateral predominantly peripheral ground-glass opacities and infiltrates compatible with Covid-19 pattern.  Cardiomegaly and significant coronary arterial vascular plaque      Patient Instructions:      Medication List      START taking these medications    dexamethasone 6 MG tablet  Commonly known as:  DECADRON  Take 1 tablet by mouth 2 times daily (with meals) for 10 days     vitamin C 500 MG tablet  Commonly known as:  ASCORBIC ACID  Take 2 tablets by mouth daily for 10 days     zinc sulfate 220 (50 Zn) MG capsule  Commonly known as:  Orazinc  Take 1 capsule by mouth daily        CHANGE how you take these medications    bumetanide 1 MG tablet  Commonly known as:  BUMEX  Take 1 tablet by mouth 2 times daily  What changed:    · medication strength  · when to take this        CONTINUE taking these medications    acetaminophen 500 MG tablet  Commonly known as:  TYLENOL  Take 2 tablets by mouth every 8 hours as needed for Pain     cetirizine 10 MG tablet  Commonly known as:  ZYRTEC     Eliquis 5 MG Tabs tablet  Generic drug:  apixaban     ibuprofen 400 MG tablet  Commonly known as:  IBU  Take 1 tablet by mouth every 8 hours as needed for Pain Take with full glass of water     lisinopril 20 MG tablet  Commonly known as:  PRINIVIL;ZESTRIL  Take 1 tablet by mouth daily     metoprolol succinate 100 MG extended release tablet  Commonly known as:  TOPROL XL     omeprazole 20 MG delayed release capsule  Commonly known as:  PRILOSEC     spironolactone 25 MG tablet  Commonly known as:  ALDACTONE     valsartan 80 MG tablet  Commonly known as:  DIOVAN     vitamin B-12 1000 MCG tablet  Commonly known as:  CYANOCOBALAMIN        STOP taking these medications    furosemide 40 MG tablet  Commonly known as:  LASIX           Where to Get Your Medications      These medications were sent to [facility], [address] [place] [address] [phone] Tia Dawn [phone]  [address] [facility], [address] [place] [id number]    Phone: [phone]   · bumetanide 1 MG tablet  · dexamethasone 6 MG tablet  · vitamin C 500 MG tablet  · zinc sulfate 220 (50 Zn) MG capsule       PLAN:     1.  Acute hypoxic respiratory failure  -Room air saturation on admission was 84% he required 3 L nasal cannula to maintain And typically does not require oxygen  -Differential includes COVID-19 viral pneumonia, CHF exacerbation, bacterial pneumonia, fluid volume overload     -Patient received dose of Lasix in the ER  -Dexamethasone in the ER  -We will continue steroids  -Send COVID-19 labs, interleukin-1, ferritin, CRP, D-dimer, LDH, procalcitonin, baseline labs repeated. -Titrate oxygen    -COVID-19 positive     Atypical chest pain substernal tenderness -follow in the setting of acute hypoxic respiratory failure respiratory distress  -Cycle troponins negative thus far  -EKG           Abdominal pain  -CT abdomen unremarkable no acute findings. ,  Renal cyst noted. -Been having diarrhea cramps nausea vomiting for about a week  -Zofran, Phenergan  -Send stool for C. difficile           Diarrhea  -As mentioned above ongoing for at least a week he said 7-10 watery diarrhea today  Differential would include viral gastroenteritis, C. difficile, COVID-19  -Send stool for C.  Difficile  -Supportive care              Nausea and vomiting  -As above  -Compazine in setting of prolonged QTC           Congestive heart failure [place] class I-acute on chronic combined heart failure  -Acute on chronic systolic heart failure  -Last 2D echocardiogram September 2019 with some left ventricular hypertrophy EF of 35%  -Home regimen includes Bumex, Lasix and Aldactone---we will review med rec  -Consult local cardiology Dr Sekou Murguia last consult   -Continue Lasix, Bumex, Aldactone for now with adjustments  -Received a dose of Lasix in the ER   -Reviewed cardiology consult this morning otherwise no changes        Atrial fibrillation  -Continue home regimen of Eliquis and Toprol  -Monitor

## 2020-11-22 NOTE — ED NOTES
Patient unable to provide stool sample at this time; instructed to use call light when able to.      Amada Julio RN  11/21/20 2001

## 2020-11-22 NOTE — PROGRESS NOTES
Pulse ox was_94__% on room air at rest.  Ambulated patient on room air. Oxygen saturation was ___86__% on room air while ambulating. Oxygen applied. Recovery pulse ox was __90___% on __2___liters of oxygen while ambulating.

## 2020-11-23 ENCOUNTER — CARE COORDINATION (OUTPATIENT)
Dept: CASE MANAGEMENT | Age: 80
End: 2020-11-23

## 2020-11-23 NOTE — CARE COORDINATION
[facility] Transitions Initial Follow Up Call    Call within 2 business days of discharge: Yes    Patient: Yehuda Decker Patient : 1940   MRN: [id number]  Reason for Admission: acute hypoxemic respiratory failure due to covid-19  Discharge Date: 20 RARS: Readmission Risk Score: 12      Last Discharge [facility]       Complaint Diagnosis Description Type Department Provider    20 Shortness of Breath; Abdominal Pain Acute hypoxemic respiratory failure due to COVID-19 [facility]) ED to Hosp-Admission (Discharged) (ADMITTED) NEELA Domingo MD; Theodore Vaca. .. Patient contacted regarding [id number] diagnosis\". Discussed COVID-19 related testing which was available at this time. Test results were positive done on 20. Patient informed of results, if available? Yes, patient was already aware of positive result. Care Transition Nurse/ Ambulatory Care Manager contacted the patient by telephone to perform post discharge assessment. Call within 2 business days of discharge: Yes. Verified name and  with patient as identifiers. Provided introduction to self, and explanation of the CTN/ACM role, and reason for call due to risk factors for infection and/or exposure to COVID-19. Symptoms reviewed with patient who verbalized the following symptoms: Patient states the only symptom remaining is nausea. Patient denies any SOB, states he is wearing 2L NC. Patient states he is self-isolating. Due to no new or worsening symptoms encounter was not routed to provider for escalation. Discussed follow-up appointments. If no appointment was previously scheduled, appointment scheduling offered: No, patient states he will call his PCP office himself. [address] follow up appointment(s): No future appointments.   Non[facility] follow up appointment(s): none    Non-face-to-face services provided:  Obtained and reviewed discharge summary and/or continuity of care documents     Advance Care Planning: Does patient have an Advance Directive:  decision maker updated. Patient has following risk factors of: heart failure, acute respiratory failure and HTN. CTN/ACM reviewed discharge instructions, medical action plan and red flags such as increased shortness of breath, increasing fever and signs of decompensation with patient who verbalized understanding. Discussed exposure protocols and quarantine with CDC Guidelines What to do if you are sick with coronavirus disease 2019.  Patient was given an opportunity for questions and concerns. The patient agrees to contact the [facility] exposure line [phone], [facility] department [place] Department of Health: ([phone]) and PCP office for questions related to their healthcare. CTN/ACM provided contact information for future needs. Reviewed and educated patient on any new and changed medications related to discharge diagnosis. Patient declined full med review. Patient states he has his new medications of decadron, vitamin c, and zinc as per AVS    Patient/family/caregiver given information for [employer] [facility] and agrees to enroll no  Patient's preferred e-mail: declines   Patient's preferred phone number: declines    Plan for follow-up call in 5-7 days based on severity of symptoms and risk factors. Patient agreeable to follow up call. Follow Up  No future appointments.     Alin Soares RN

## 2020-12-01 ENCOUNTER — CARE COORDINATION (OUTPATIENT)
Dept: CASE MANAGEMENT | Age: 80
End: 2020-12-01

## 2020-12-01 NOTE — CARE COORDINATION
Patient resolved from the Care Transitions episode on 12/1/20. Patient/family has been provided the following resources and education related to COVID-19:                         Signs, symptoms and red flags related to COVID-19            CDC exposure and quarantine guidelines            [facility] exposure contact - [phone]            Contact for their local Department of Health                 Patient currently reports that the following symptoms have improved:  Nausea has resolved. Patient remains in his oxygen at 2 L NC and states as long as he is on the oxygen he does not have SOB but becomes SOB without it. Patient states he has not yet called his PCP for appointment but will do so tomorrow. No further outreach scheduled with this CTN/ACM. Episode of Care resolved. Patient has this CTN/ACM contact information if future needs arise.

## 2020-12-07 LAB
Lab: NORMAL
REPORT: NORMAL
THIS TEST SENT TO: NORMAL

## 2021-06-15 ENCOUNTER — HOSPITAL ENCOUNTER (OUTPATIENT)
Dept: ULTRASOUND IMAGING | Age: 81
Discharge: HOME | End: 2021-06-17
Payer: OTHER GOVERNMENT

## 2021-06-15 DIAGNOSIS — M79.604 PAIN OF RIGHT LOWER EXTREMITY: ICD-10-CM

## 2021-06-15 DIAGNOSIS — R60.9 SWELLING: ICD-10-CM

## 2021-06-15 PROCEDURE — 93971 EXTREMITY STUDY: CPT | Performed by: RADIOLOGY

## 2021-06-15 PROCEDURE — 93971 EXTREMITY STUDY: CPT

## 2021-08-12 ENCOUNTER — HOSPITAL ENCOUNTER (INPATIENT)
Age: 81
LOS: 3 days | Discharge: HOME | DRG: 871 | End: 2021-08-15
Attending: EMERGENCY MEDICINE | Admitting: INTERNAL MEDICINE
Payer: OTHER GOVERNMENT

## 2021-08-12 ENCOUNTER — APPOINTMENT (OUTPATIENT)
Dept: GENERAL RADIOLOGY | Age: 81
DRG: 871 | End: 2021-08-12
Payer: OTHER GOVERNMENT

## 2021-08-12 ENCOUNTER — APPOINTMENT (OUTPATIENT)
Dept: CT IMAGING | Age: 81
DRG: 871 | End: 2021-08-12
Payer: OTHER GOVERNMENT

## 2021-08-12 DIAGNOSIS — A41.9 SEPTIC SHOCK (HCC): Primary | ICD-10-CM

## 2021-08-12 DIAGNOSIS — R65.21 SEPTIC SHOCK (HCC): Primary | ICD-10-CM

## 2021-08-12 DIAGNOSIS — Z86.79 HX OF CARDIOMYOPATHY: ICD-10-CM

## 2021-08-12 DIAGNOSIS — J96.01 ACUTE RESPIRATORY FAILURE WITH HYPOXIA (HCC): ICD-10-CM

## 2021-08-12 DIAGNOSIS — J18.9 PNEUMONIA OF BOTH LUNGS DUE TO INFECTIOUS ORGANISM, UNSPECIFIED PART OF LUNG: ICD-10-CM

## 2021-08-12 LAB
ADENOVIRUS BY PCR: NOT DETECTED
ALBUMIN SERPL-MCNC: 3.8 G/DL (ref 3.5–5.2)
ALP BLD-CCNC: 88 U/L (ref 40–129)
ALT SERPL-CCNC: 10 U/L (ref 0–40)
ANION GAP SERPL CALCULATED.3IONS-SCNC: 15 MMOL/L (ref 7–16)
ANISOCYTOSIS: ABNORMAL
AST SERPL-CCNC: 14 U/L (ref 0–39)
B.E.: -1.5 MMOL/L (ref -3–3)
B.E.: -12.2 MMOL/L (ref -3–3)
B.E.: -4.3 MMOL/L (ref -3–3)
BASOPHILS ABSOLUTE: 0 E9/L (ref 0–0.2)
BASOPHILS RELATIVE PERCENT: 0 % (ref 0–2)
BILIRUB SERPL-MCNC: 1.2 MG/DL (ref 0–1.2)
BILIRUBIN DIRECT: 0.4 MG/DL (ref 0–0.3)
BILIRUBIN, INDIRECT: 0.8 MG/DL (ref 0–1)
BORDETELLA PARAPERTUSSIS BY PCR: NOT DETECTED
BORDETELLA PERTUSSIS BY PCR: NOT DETECTED
BUN BLDV-MCNC: 31 MG/DL (ref 6–23)
C-REACTIVE PROTEIN: 26.5 MG/DL (ref 0–0.4)
CALCIUM SERPL-MCNC: 9 MG/DL (ref 8.6–10.2)
CHLAMYDOPHILIA PNEUMONIAE BY PCR: NOT DETECTED
CHLORIDE BLD-SCNC: 100 MMOL/L (ref 98–107)
CO2: 23 MMOL/L (ref 22–29)
COHB: 1.4 % (ref 0–1.5)
COHB: 1.4 % (ref 0–1.5)
COHB: 1.5 % (ref 0–1.5)
COMMENT: ABNORMAL
CORONAVIRUS 229E BY PCR: NOT DETECTED
CORONAVIRUS HKU1 BY PCR: NOT DETECTED
CORONAVIRUS NL63 BY PCR: NOT DETECTED
CORONAVIRUS OC43 BY PCR: NOT DETECTED
CREAT SERPL-MCNC: 1.9 MG/DL (ref 0.7–1.2)
CRITICAL: ABNORMAL
DATE ANALYZED: ABNORMAL
DATE OF COLLECTION: ABNORMAL
EKG ATRIAL RATE: 85 BPM
EKG Q-T INTERVAL: 422 MS
EKG QRS DURATION: 196 MS
EKG QTC CALCULATION (BAZETT): 498 MS
EKG R AXIS: -84 DEGREES
EKG T AXIS: 86 DEGREES
EKG VENTRICULAR RATE: 84 BPM
EOSINOPHILS ABSOLUTE: 0 E9/L (ref 0.05–0.5)
EOSINOPHILS RELATIVE PERCENT: 0 % (ref 0–6)
GFR AFRICAN AMERICAN: 41
GFR NON-AFRICAN AMERICAN: 34 ML/MIN/1.73
GLUCOSE BLD-MCNC: 131 MG/DL (ref 74–99)
HCO3: 12.3 MMOL/L (ref 22–26)
HCO3: 20.5 MMOL/L (ref 22–26)
HCO3: 25.3 MMOL/L (ref 22–26)
HCT VFR BLD CALC: 43.6 % (ref 37–54)
HEMOGLOBIN: 15 G/DL (ref 12.5–16.5)
HHB: 2.7 % (ref 0–5)
HHB: 40.7 % (ref 0–5)
HHB: 66 % (ref 0–5)
HUMAN METAPNEUMOVIRUS BY PCR: NOT DETECTED
HUMAN RHINOVIRUS/ENTEROVIRUS BY PCR: NOT DETECTED
INFLUENZA A BY PCR: NOT DETECTED
INFLUENZA B BY PCR: NOT DETECTED
LAB: ABNORMAL
LACTIC ACID, SEPSIS: 1.7 MMOL/L (ref 0.5–1.9)
LACTIC ACID, SEPSIS: 3.3 MMOL/L (ref 0.5–1.9)
LACTIC ACID: 1.2 MMOL/L (ref 0.5–2.2)
LYMPHOCYTES ABSOLUTE: 0.26 E9/L (ref 1.5–4)
LYMPHOCYTES RELATIVE PERCENT: 0.9 % (ref 20–42)
Lab: ABNORMAL
MAGNESIUM: 1.2 MG/DL (ref 1.6–2.6)
MCH RBC QN AUTO: 32.8 PG (ref 26–35)
MCHC RBC AUTO-ENTMCNC: 34.4 % (ref 32–34.5)
MCV RBC AUTO: 95.2 FL (ref 80–99.9)
METAMYELOCYTES RELATIVE PERCENT: 0.9 % (ref 0–1)
METER GLUCOSE: 133 MG/DL (ref 74–99)
METHB: 0.2 % (ref 0–1.5)
METHB: 0.2 % (ref 0–1.5)
METHB: 0.3 % (ref 0–1.5)
MODE: ABNORMAL
MONOCYTES ABSOLUTE: 1.03 E9/L (ref 0.1–0.95)
MONOCYTES RELATIVE PERCENT: 3.5 % (ref 2–12)
MYCOPLASMA PNEUMONIAE BY PCR: NOT DETECTED
NEUTROPHILS ABSOLUTE: 24.42 E9/L (ref 1.8–7.3)
NEUTROPHILS RELATIVE PERCENT: 93.8 % (ref 43–80)
NUCLEATED RED BLOOD CELLS: 0 /100 WBC
O2 CONTENT: 18.8 ML/DL
O2 CONTENT: 6.4 ML/DL
O2 CONTENT: 7.4 ML/DL
O2 SATURATION: 32.9 % (ref 92–98.5)
O2 SATURATION: 58.6 % (ref 92–98.5)
O2 SATURATION: 97.3 % (ref 92–98.5)
O2HB: 32.3 % (ref 94–97)
O2HB: 57.7 % (ref 94–97)
O2HB: 95.6 % (ref 94–97)
OPERATOR ID: 316
OPERATOR ID: 316
OPERATOR ID: 420
OVALOCYTES: ABNORMAL
PARAINFLUENZA VIRUS 1 BY PCR: NOT DETECTED
PARAINFLUENZA VIRUS 2 BY PCR: NOT DETECTED
PARAINFLUENZA VIRUS 3 BY PCR: NOT DETECTED
PARAINFLUENZA VIRUS 4 BY PCR: NOT DETECTED
PATIENT TEMP: 37 C
PCO2: 24 MMHG (ref 35–45)
PCO2: 37.1 MMHG (ref 35–45)
PCO2: 50.5 MMHG (ref 35–45)
PDW BLD-RTO: 13.5 FL (ref 11.5–15)
PH BLOOD GAS: 7.32 (ref 7.35–7.45)
PH BLOOD GAS: 7.33 (ref 7.35–7.45)
PH BLOOD GAS: 7.36 (ref 7.35–7.45)
PLATELET # BLD: 188 E9/L (ref 130–450)
PMV BLD AUTO: 12 FL (ref 7–12)
PO2: 21.4 MMHG (ref 75–100)
PO2: 32.3 MMHG (ref 75–100)
PO2: 97.4 MMHG (ref 75–100)
POIKILOCYTES: ABNORMAL
POLYCHROMASIA: ABNORMAL
POTASSIUM SERPL-SCNC: 4.1 MMOL/L (ref 3.5–5)
PRO-BNP: 3836 PG/ML (ref 0–450)
PROMYELOCYTES PERCENT: 0.9 % (ref 0–0)
RBC # BLD: 4.58 E12/L (ref 3.8–5.8)
RESPIRATORY SYNCYTIAL VIRUS BY PCR: NOT DETECTED
SARS-COV-2, NAAT: NOT DETECTED
SARS-COV-2, PCR: NOT DETECTED
SEDIMENTATION RATE, ERYTHROCYTE: 57 MM/HR (ref 0–15)
SODIUM BLD-SCNC: 138 MMOL/L (ref 132–146)
SOURCE, BLOOD GAS: ABNORMAL
THB: 13.9 G/DL (ref 11.5–16.5)
THB: 14.1 G/DL (ref 11.5–16.5)
THB: 9.1 G/DL (ref 11.5–16.5)
TIME ANALYZED: 1555
TIME ANALYZED: 1625
TIME ANALYZED: 1732
TOTAL PROTEIN: 7.3 G/DL (ref 6.4–8.3)
TROPONIN, HIGH SENSITIVITY: 35 NG/L (ref 0–11)
TROPONIN, HIGH SENSITIVITY: 37 NG/L (ref 0–11)
WBC # BLD: 25.7 E9/L (ref 4.5–11.5)

## 2021-08-12 PROCEDURE — 6370000000 HC RX 637 (ALT 250 FOR IP): Performed by: INTERNAL MEDICINE

## 2021-08-12 PROCEDURE — 82805 BLOOD GASES W/O2 SATURATION: CPT

## 2021-08-12 PROCEDURE — 2500000003 HC RX 250 WO HCPCS: Performed by: EMERGENCY MEDICINE

## 2021-08-12 PROCEDURE — 84484 ASSAY OF TROPONIN QUANT: CPT

## 2021-08-12 PROCEDURE — 83735 ASSAY OF MAGNESIUM: CPT

## 2021-08-12 PROCEDURE — 86140 C-REACTIVE PROTEIN: CPT

## 2021-08-12 PROCEDURE — 6360000002 HC RX W HCPCS: Performed by: EMERGENCY MEDICINE

## 2021-08-12 PROCEDURE — 96361 HYDRATE IV INFUSION ADD-ON: CPT

## 2021-08-12 PROCEDURE — 87070 CULTURE OTHR SPECIMN AEROBIC: CPT

## 2021-08-12 PROCEDURE — 36415 COLL VENOUS BLD VENIPUNCTURE: CPT

## 2021-08-12 PROCEDURE — 83880 ASSAY OF NATRIURETIC PEPTIDE: CPT

## 2021-08-12 PROCEDURE — 99223 1ST HOSP IP/OBS HIGH 75: CPT | Performed by: INTERNAL MEDICINE

## 2021-08-12 PROCEDURE — 6360000002 HC RX W HCPCS: Performed by: INTERNAL MEDICINE

## 2021-08-12 PROCEDURE — 93010 ELECTROCARDIOGRAM REPORT: CPT | Performed by: INTERNAL MEDICINE

## 2021-08-12 PROCEDURE — 87635 SARS-COV-2 COVID-19 AMP PRB: CPT

## 2021-08-12 PROCEDURE — 87040 BLOOD CULTURE FOR BACTERIA: CPT

## 2021-08-12 PROCEDURE — 70450 CT HEAD/BRAIN W/O DYE: CPT

## 2021-08-12 PROCEDURE — 84145 PROCALCITONIN (PCT): CPT

## 2021-08-12 PROCEDURE — 2580000003 HC RX 258: Performed by: INTERNAL MEDICINE

## 2021-08-12 PROCEDURE — 0202U NFCT DS 22 TRGT SARS-COV-2: CPT

## 2021-08-12 PROCEDURE — 99284 EMERGENCY DEPT VISIT MOD MDM: CPT

## 2021-08-12 PROCEDURE — 71275 CT ANGIOGRAPHY CHEST: CPT

## 2021-08-12 PROCEDURE — 80048 BASIC METABOLIC PNL TOTAL CA: CPT

## 2021-08-12 PROCEDURE — 87081 CULTURE SCREEN ONLY: CPT

## 2021-08-12 PROCEDURE — 2580000003 HC RX 258: Performed by: EMERGENCY MEDICINE

## 2021-08-12 PROCEDURE — 80076 HEPATIC FUNCTION PANEL: CPT

## 2021-08-12 PROCEDURE — 71045 X-RAY EXAM CHEST 1 VIEW: CPT

## 2021-08-12 PROCEDURE — 93005 ELECTROCARDIOGRAM TRACING: CPT | Performed by: EMERGENCY MEDICINE

## 2021-08-12 PROCEDURE — 6360000004 HC RX CONTRAST MEDICATION: Performed by: RADIOLOGY

## 2021-08-12 PROCEDURE — 83605 ASSAY OF LACTIC ACID: CPT

## 2021-08-12 PROCEDURE — 85651 RBC SED RATE NONAUTOMATED: CPT

## 2021-08-12 PROCEDURE — 87206 SMEAR FLUORESCENT/ACID STAI: CPT

## 2021-08-12 PROCEDURE — 85025 COMPLETE CBC W/AUTO DIFF WBC: CPT

## 2021-08-12 PROCEDURE — 02HV33Z INSERTION OF INFUSION DEVICE INTO SUPERIOR VENA CAVA, PERCUTANEOUS APPROACH: ICD-10-PCS | Performed by: EMERGENCY MEDICINE

## 2021-08-12 PROCEDURE — 96374 THER/PROPH/DIAG INJ IV PUSH: CPT

## 2021-08-12 PROCEDURE — 82962 GLUCOSE BLOOD TEST: CPT

## 2021-08-12 PROCEDURE — 2000000000 HC ICU R&B

## 2021-08-12 PROCEDURE — 74174 CTA ABD&PLVS W/CONTRAST: CPT

## 2021-08-12 RX ORDER — SODIUM CHLORIDE 9 MG/ML
25 INJECTION, SOLUTION INTRAVENOUS PRN
Status: DISCONTINUED | OUTPATIENT
Start: 2021-08-12 | End: 2021-08-15 | Stop reason: HOSPADM

## 2021-08-12 RX ORDER — PANTOPRAZOLE SODIUM 40 MG/1
40 TABLET, DELAYED RELEASE ORAL
Status: DISCONTINUED | OUTPATIENT
Start: 2021-08-13 | End: 2021-08-15 | Stop reason: HOSPADM

## 2021-08-12 RX ORDER — 0.9 % SODIUM CHLORIDE 0.9 %
500 INTRAVENOUS SOLUTION INTRAVENOUS ONCE
Status: COMPLETED | OUTPATIENT
Start: 2021-08-12 | End: 2021-08-12

## 2021-08-12 RX ORDER — POLYETHYLENE GLYCOL 3350 17 G/17G
17 POWDER, FOR SOLUTION ORAL DAILY PRN
Status: DISCONTINUED | OUTPATIENT
Start: 2021-08-12 | End: 2021-08-15 | Stop reason: HOSPADM

## 2021-08-12 RX ORDER — SODIUM CHLORIDE 0.9 % (FLUSH) 0.9 %
10 SYRINGE (ML) INJECTION PRN
Status: DISCONTINUED | OUTPATIENT
Start: 2021-08-12 | End: 2021-08-15 | Stop reason: HOSPADM

## 2021-08-12 RX ORDER — MAGNESIUM SULFATE IN WATER 40 MG/ML
2000 INJECTION, SOLUTION INTRAVENOUS ONCE
Status: COMPLETED | OUTPATIENT
Start: 2021-08-12 | End: 2021-08-12

## 2021-08-12 RX ORDER — SODIUM CHLORIDE 9 MG/ML
INJECTION, SOLUTION INTRAVENOUS EVERY 12 HOURS
Status: DISCONTINUED | OUTPATIENT
Start: 2021-08-13 | End: 2021-08-13

## 2021-08-12 RX ORDER — ZINC SULFATE 50(220)MG
50 CAPSULE ORAL DAILY
Status: DISCONTINUED | OUTPATIENT
Start: 2021-08-12 | End: 2021-08-15 | Stop reason: HOSPADM

## 2021-08-12 RX ORDER — SODIUM CHLORIDE 0.9 % (FLUSH) 0.9 %
10 SYRINGE (ML) INJECTION PRN
Status: DISCONTINUED | OUTPATIENT
Start: 2021-08-12 | End: 2021-08-12 | Stop reason: SDUPTHER

## 2021-08-12 RX ORDER — ACETAMINOPHEN 650 MG/1
650 SUPPOSITORY RECTAL EVERY 6 HOURS PRN
Status: DISCONTINUED | OUTPATIENT
Start: 2021-08-12 | End: 2021-08-15 | Stop reason: HOSPADM

## 2021-08-12 RX ORDER — ACETAMINOPHEN 325 MG/1
650 TABLET ORAL EVERY 6 HOURS PRN
Status: DISCONTINUED | OUTPATIENT
Start: 2021-08-12 | End: 2021-08-15 | Stop reason: HOSPADM

## 2021-08-12 RX ORDER — SODIUM CHLORIDE 0.9 % (FLUSH) 0.9 %
10 SYRINGE (ML) INJECTION EVERY 12 HOURS SCHEDULED
Status: DISCONTINUED | OUTPATIENT
Start: 2021-08-12 | End: 2021-08-15 | Stop reason: HOSPADM

## 2021-08-12 RX ADMIN — APIXABAN 5 MG: 5 TABLET, FILM COATED ORAL at 20:30

## 2021-08-12 RX ADMIN — SODIUM CHLORIDE 500 ML: 9 INJECTION, SOLUTION INTRAVENOUS at 15:56

## 2021-08-12 RX ADMIN — SODIUM CHLORIDE 500 ML: 9 INJECTION, SOLUTION INTRAVENOUS at 17:23

## 2021-08-12 RX ADMIN — IOPAMIDOL 100 ML: 755 INJECTION, SOLUTION INTRAVENOUS at 15:33

## 2021-08-12 RX ADMIN — DOXYCYCLINE 100 MG: 100 INJECTION, POWDER, LYOPHILIZED, FOR SOLUTION INTRAVENOUS at 18:55

## 2021-08-12 RX ADMIN — SODIUM CHLORIDE 500 ML: 9 INJECTION, SOLUTION INTRAVENOUS at 16:41

## 2021-08-12 RX ADMIN — SODIUM CHLORIDE 500 ML: 9 INJECTION, SOLUTION INTRAVENOUS at 16:08

## 2021-08-12 RX ADMIN — SODIUM CHLORIDE: 9 INJECTION, SOLUTION INTRAVENOUS at 23:00

## 2021-08-12 RX ADMIN — CEFEPIME HYDROCHLORIDE 1000 MG: 1 INJECTION, POWDER, FOR SOLUTION INTRAMUSCULAR; INTRAVENOUS at 20:17

## 2021-08-12 RX ADMIN — MAGNESIUM SULFATE HEPTAHYDRATE 2000 MG: 40 INJECTION, SOLUTION INTRAVENOUS at 16:58

## 2021-08-12 RX ADMIN — ZINC SULFATE 220 MG (50 MG) CAPSULE 50 MG: CAPSULE at 20:28

## 2021-08-12 RX ADMIN — SODIUM CHLORIDE, PRESERVATIVE FREE 10 ML: 5 INJECTION INTRAVENOUS at 20:14

## 2021-08-12 RX ADMIN — WATER 2000 MG: 1 INJECTION INTRAMUSCULAR; INTRAVENOUS; SUBCUTANEOUS at 16:52

## 2021-08-12 ASSESSMENT — PAIN SCALES - GENERAL
PAINLEVEL_OUTOF10: 0

## 2021-08-12 ASSESSMENT — ENCOUNTER SYMPTOMS
BACK PAIN: 0
SWOLLEN GLANDS: 0
DIARRHEA: 0
SHORTNESS OF BREATH: 1
EYE PAIN: 0
SORE THROAT: 0
COUGH: 1
EYE DISCHARGE: 0
HEMOPTYSIS: 0
WHEEZING: 0
VOMITING: 0
SINUS PRESSURE: 0
EYE REDNESS: 0
ABDOMINAL PAIN: 0
NAUSEA: 0

## 2021-08-12 NOTE — ED NOTES
Called will ext [phone] to tell him patient ready to come to ICU TLC done Levophed not started /51 mean [address], RN  08/12/21 8069

## 2021-08-12 NOTE — ED NOTES
Name: Ayana Holman  : 1940  MRN: [id number]    Date: 2021    Benefits of immediately proceeding with Radiology exam outweigh the risks and therefore the following is being waived:      [] Pregnancy test    [] Protocol for Iodine allergy    [] MRI questionnaire    [x] BUN/Creatinine        DO Jose David Bosch DO  21 3537

## 2021-08-12 NOTE — H&P
[facility] History and Physical      CHIEF COMPLAINT:  SOB    History of Present Illness:  81 yo female with a hx of afib on eliquis, hypertension, heart failure LVEF 35%. For the past few days he has been complaining of a cough productive of mucus and shortness of breath. Has generalized weakness. Had one episode of vomiting. No fever chills that he knows of. He tracks his weight states it is around 250 with no recent abrupt change. No sick contacts. Had Covid vaccine and March. Presented to the ED due to shortness of breath. Found to be hypotensive despite 2 L IVF therefore started on pressors. Informant(s) for H&P: Patient    REVIEW OF SYSTEMS:  A comprehensive 14 point review of systems was negative except for: what is in the HPI    PMH:  Past Medical History:   Diagnosis Date    Apnea, sleep     Atrial fibrillation ([address]     Hypertension     Obesity     Pacemaker        Surgical History:  Past Surgical History:   Procedure Laterality Date    ABLATION OF DYSRHYTHMIC FOCUS      x4    FINGER AMPUTATION      x3 right hand    SHOULDER SURGERY      total replacement bilat       Medications Prior to Admission:    Prior to Admission medications    Medication Sig Start Date End Date Taking?  Authorizing Provider   vitamin C (ASCORBIC ACID) 500 MG tablet Take 2 tablets by mouth daily for 10 days 11/22/20 12/2/20  MANUEL Burton   zinc sulfate (ORAZINC) 220 (50 Zn) MG capsule Take 1 capsule by mouth daily 11/22/20 11/22/21  MANUEL Burton   bumetanide (BUMEX) 1 MG tablet Take 1 tablet by mouth 2 times daily 11/22/20   MANUEL Burton   omeprazole (PRILOSEC) 20 MG delayed release capsule Take 20 mg by mouth daily    Historical Provider, MD   valsartan (DIOVAN) 80 MG tablet Take 80 mg by mouth daily    Historical Provider, MD   ibuprofen (IBU) 400 MG tablet Take 1 tablet by mouth every 8 hours as needed for Pain Take with full glass of water 8/16/20 Edson Colin MD   acetaminophen (TYLENOL) 500 MG tablet Take 2 tablets by mouth every 8 hours as needed for Pain 20   Edson Colin MD   apixaban (ELIQUIS) 5 MG TABS tablet Take by mouth 2 times daily    Historical Provider, MD   lisinopril (PRINIVIL;ZESTRIL) 20 MG tablet Take 1 tablet by mouth daily 19   Ferdinand Castro MD   vitamin B-12 (CYANOCOBALAMIN) 1000 MCG tablet Take 1,000 mcg by mouth daily    Historical Provider, MD   cetirizine (ZYRTEC) 10 MG tablet Take 10 mg by mouth daily. Historical Provider, MD   spironolactone (ALDACTONE) 25 MG tablet Take 25 mg by mouth daily. Historical Provider, MD   metoprolol (TOPROL-XL) 100 MG XL tablet Take 100 mg by mouth daily     Historical Provider, MD       Allergies:    Coumadin [warfarin sodium] and Heparin    Social History:    reports that he has quit smoking. He has never used smokeless tobacco. He reports current alcohol use of about 14.0 standard drinks of alcohol per week. Family History:    Mother--denies any contributary histories  Father- around age 79 had coronary artery disease    PHYSICAL EXAM:  Vitals:  BP (!) 88/56   Pulse 69   Temp 96.9 °F (36.1 °C) (Temporal)   Resp 18   Ht 5' 10\" (1.778 m)   Wt 250 lb (113.4 kg)   SpO2 97%   BMI 35.87 kg/m²   General Appearance: alert and oriented to person, place and time and in no acute distress  Skin: warm and dry, turgor not diminished  Head: normocephalic and atraumatic  Eyes: pupils equal, round, and reactive to light, extraocular eye movements intact, conjunctivae normal  Neck: neck supple and non tender without mass   Pulmonary/Chest: clear to auscultation bilaterally- no wheezes, rales or rhonchi, normal air movement, no respiratory distress  Cardiovascular: normal rate, normal S1 and S2 and 2 out of 6 systolic ejection murmur  Abdomen: soft, non-tender, non-distended, normal bowel sounds, no masses or organomegaly  Extremities: no cyanosis, no clubbing and no edema  Neurologic: no cranial nerve deficit and speech normal    LABS:  Recent Labs     08/12/21  1526      K 4.1      CO2 23   BUN 31*   CREATININE 1.9*   GLUCOSE 131*   CALCIUM 9.0       Recent Labs     08/12/21  1526   WBC 25.7*   RBC 4.58   HGB 15.0   HCT 43.6   MCV 95.2   MCH 32.8   MCHC 34.4   RDW 13.5      MPV 12.0       No results for input(s): POCGLU in the last 72 hours. Radiology:   CT HEAD WO CONTRAST   Final Result   No acute intracranial abnormality. No evidence of metastatic disease on this study limited by absence of   intravenous contrast.         CTA CHEST W CONTRAST   Final Result   1. Multifocal areas of consolidation seen within the left upper lobe, right   lower lobe and to a lesser extent within the right middle lobe. The area of   consolidation within the left upper lobe is more rounded in appearance and   measures approximately 5 cm x 4.7 cm. Given the multiplicity and appearance   of the consolidation within the right lower lobe I would favor these findings   to represent pneumonia. Malignancy within the left upper lobe is less likely   but cannot be completely excluded. Dedicated follow-up chest x-ray is   recommended in 2-3 weeks to confirm reduction in size or resolution and   exclude underlying pathology. 2.  There is no evidence of a thoracic aortic aneurysm or dissection      3. There is no pulmonary embolus. CTA ABDOMEN PELVIS W CONTRAST   Final Result   1. There is no abdominal aortic aneurysm or dissection. 2. There is no acute intra-abdominal or intrapelvic pathology   3. Bilateral renal cortical thinning. 4. Multifocal bilateral pulmonary opacities as described on the dedicated CT   of the chest.  Please see the CT of the chest report. EKG:  Ventricular-paced rhythm with frequent premature ventricular complexes  Abnormal ECG  When compared with ECG of 21-NOV-2020 10:30,  premature

## 2021-08-12 NOTE — ED PROVIDER NOTES
Pupils: Pupils are equal, round, and reactive to light. Cardiovascular:      Rate and Rhythm: Normal rate and regular rhythm. Pulmonary:      Effort: Pulmonary effort is normal.      Breath sounds: Normal breath sounds. No decreased breath sounds, wheezing, rhonchi or rales. Abdominal:      General: Bowel sounds are normal.      Palpations: Abdomen is soft. Genitourinary:     Rectum: Guaiac result negative. Musculoskeletal:         General: Normal range of motion. Cervical back: Normal range of motion. Right lower leg: No tenderness. No edema. Left lower leg: No tenderness. No edema. Skin:     General: Skin is warm. Capillary Refill: Capillary refill takes less than 2 seconds. Neurological:      General: No focal deficit present. Mental Status: He is alert. Procedures   Central Line Placement Procedure Note    Indication: vascular access, central venous monitoring and centrally administered medications    Consent: The patient was counseled regarding the procedure, its indications, risks, potential complications and alternatives, and any questions were answered. Consent was obtained to proceed. Procedure: The patient was positioned appropriately and the skin over the right internal jugular vein was prepped with Chloraprep. Local anesthesia was obtained by infiltration using 1% Lidocaine without epinephrine. A large bore needle was used to identify the vein. A guide wire was then inserted into the vein through the needle. A triple lumen catheter was then inserted into the vessel over the guide wire using the Seldinger technique. All ports showed good, free flowing blood return and were flushed with saline solution. The catheter was then securely fastened to the skin with suture at 18 cm.   Two sutures were placed into the kit included tube clamp, proximal eyelets and a suture end from each of the securing sutures was extended around the catheter and tied to the proximal eyelets as an added measure to prevent dislodgement. An antibiotic disk was placed and the site was then covered with a sterile dressing. A post procedure X-ray was ordered and showed good line position. The patient tolerated the procedure well. Complications: None        MDM  Number of Diagnoses or Management Options  Diagnosis management comments: Patient seen and examined. Labs and imaging were ordered. Patient hypoxic hypotensive and diaphoretic. Labs and emergent CT scans of the chest and abdomen were ordered to rule out dissection or aneurysm rupture these were found to show evidence of pneumonia. Patient was given small boluses of fluids given his history of congestive heart failure with an EF of 35 patient made hypotensive despite 2 L of fluid it was felt the patient warranted ICU admission. Central line was placed and Levophed was started. Antibiotics were initiated. Case discussed with ICU and with hospitalist who will admit the patient. Central line showed good placement and patient was accepted to the intensive care unit. ED Course as of Aug 12 1836   Thu Aug 12, 2021   1544 EKG: This EKG is signed and interpreted by the EP. Time: 15:10  Rate: 75  Rhythm: V-paced  Interpretation: V paced rhythm  Comparison: stable as compared to patient's most recent EKG      [CF]      ED Course User Index  [CF] Shantel Dodson, DO        --------------------------------------------- PAST HISTORY ---------------------------------------------  Past Medical History:  has a past medical history of Apnea, sleep, Atrial fibrillation ([address] Hypertension, Obesity, and Pacemaker. Past Surgical History:  has a past surgical history that includes ablation of dysrhythmic focus; shoulder surgery; and Finger amputation. Social History:  reports that he has quit smoking.  He has never used smokeless tobacco. He reports current alcohol use of about 14.0 standard drinks of alcohol per week.    Family History: family history is not on file. The patients home medications have been reviewed.     Allergies: Coumadin [warfarin sodium] and Heparin    -------------------------------------------------- RESULTS -------------------------------------------------    Lab  Results for orders placed or performed during the hospital encounter of 08/12/21   COVID-19, Rapid    Specimen: Nasopharyngeal Swab   Result Value Ref Range    SARS-CoV-2, NAAT Not Detected Not Detected   CBC Auto Differential   Result Value Ref Range    WBC 25.7 (H) 4.5 - 11.5 E9/L    RBC 4.58 3.80 - 5.80 E12/L    Hemoglobin 15.0 12.5 - 16.5 g/dL    Hematocrit 43.6 37.0 - 54.0 %    MCV 95.2 80.0 - 99.9 fL    MCH 32.8 26.0 - 35.0 pg    MCHC 34.4 32.0 - 34.5 %    RDW 13.5 11.5 - 15.0 fL    Platelets 003 821 - 910 E9/L    MPV 12.0 7.0 - 12.0 fL    Neutrophils % 93.8 (H) 43.0 - 80.0 %    Lymphocytes % 0.9 (L) 20.0 - 42.0 %    Monocytes % 3.5 2.0 - 12.0 %    Eosinophils % 0.0 0.0 - 6.0 %    Basophils % 0.0 0.0 - 2.0 %    Neutrophils Absolute 24.42 (H) 1.80 - 7.30 E9/L    Lymphocytes Absolute 0.26 (L) 1.50 - 4.00 E9/L    Monocytes Absolute 1.03 (H) 0.10 - 0.95 E9/L    Eosinophils Absolute 0.00 (L) 0.05 - 0.50 E9/L    Basophils Absolute 0.00 0.00 - 0.20 E9/L    Metamyelocytes Relative 0.9 0.0 - 1.0 %    Promyelocytes Percent 0.9 0 - 0 %    nRBC 0.0 /100 WBC    Anisocytosis 1+     Polychromasia 1+     Poikilocytes 1+     Ovalocytes 1+    Basic Metabolic Panel   Result Value Ref Range    Sodium 138 132 - 146 mmol/L    Potassium 4.1 3.5 - 5.0 mmol/L    Chloride 100 98 - 107 mmol/L    CO2 23 22 - 29 mmol/L    Anion Gap 15 7 - 16 mmol/L    Glucose 131 (H) 74 - 99 mg/dL    BUN 31 (H) 6 - 23 mg/dL    CREATININE 1.9 (H) 0.7 - 1.2 mg/dL    GFR Non-African American 34 >=60 mL/min/1.73    GFR African American 41     Calcium 9.0 8.6 - 10.2 mg/dL   Hepatic Function Panel   Result Value Ref Range    Total Protein 7.3 6.4 - 8.3 g/dL    Albumin 3.8 3.5 - 5.2 g/dL    Alkaline Phosphatase 88 40 - 129 U/L    ALT 10 0 - 40 U/L    AST 14 0 - 39 U/L    Total Bilirubin 1.2 0.0 - 1.2 mg/dL    Bilirubin, Direct 0.4 (H) 0.0 - 0.3 mg/dL    Bilirubin, Indirect 0.8 0.0 - 1.0 mg/dL   Brain Natriuretic Peptide   Result Value Ref Range    Pro-BNP 3,836 (H) 0 - 450 pg/mL   Troponin   Result Value Ref Range    Troponin, High Sensitivity 37 (H) 0 - 11 ng/L   Lactate, Sepsis   Result Value Ref Range    Lactic Acid, Sepsis 3.3 (H) 0.5 - 1.9 mmol/L   Lactate, Sepsis   Result Value Ref Range    Lactic Acid, Sepsis 1.7 0.5 - 1.9 mmol/L   Magnesium   Result Value Ref Range    Magnesium 1.2 (L) 1.6 - 2.6 mg/dL   Sedimentation Rate   Result Value Ref Range    Sed Rate 57 (H) 0 - 15 mm/Hr   Blood Gas, Arterial   Result Value Ref Range    Date Analyzed [id number]     Time Analyzed 155     Source: Blood Arterial     pH, Blood Gas 7.317 (L) 7.350 - 7.450    PCO2 50.5 (H) 35.0 - 45.0 mmHg    PO2 21.4 (LL) 75.0 - 100.0 mmHg    HCO3 25.3 22.0 - 26.0 mmol/L    B.E. -1.5 -3.0 - 3.0 mmol/L    O2 Sat 32.9 (L) 92.0 - 98.5 %    O2Hb 32.3 (L) 94.0 - 97.0 %    COHb 1.4 0.0 - 1.5 %    MetHb 0.3 0.0 - 1.5 %    O2 Content 6.4 mL/dL    HHb 66.0 (H) 0.0 - 5.0 %    tHb (est) 14.1 11.5 - 16.5 g/dL    Mode RA     Date Of Collection      Time Collected      Pt Temp 37.0 C     ID [id number]     Lab [id number]     Critical(s) Notified Handed report to Dr/RN    Blood Gas, Arterial   Result Value Ref Range    Date Analyzed [id number]     Time Analyzed 1621     Source: Blood Arterial     pH, Blood Gas 7.329 (L) 7.350 - 7.450    PCO2 24.0 (L) 35.0 - 45.0 mmHg    PO2 32.3 (LL) 75.0 - 100.0 mmHg    HCO3 12.3 (L) 22.0 - 26.0 mmol/L    B.E. -12.2 (L) -3.0 - 3.0 mmol/L    O2 Sat 58.6 (L) 92.0 - 98.5 %    O2Hb 57.7 (L) 94.0 - 97.0 %    COHb 1.4 0.0 - 1.5 %    MetHb 0.2 0.0 - 1.5 %    O2 Content 7.4 mL/dL    HHb 40.7 (H) 0.0 - 5.0 %    tHb (est) 9.1 (L) 11.5 - 16.5 g/dL    Mode RA     Comment Lynne Leyva.  VITOR with verbal read back      Date Of spoken with the patient and discussed todays results, in addition to providing specific details for the plan of care and counseling regarding the diagnosis and prognosis. Their questions are answered at this time and they are agreeable with the plan.      --------------------------------- ADDITIONAL PROVIDER NOTES ---------------------------------  This patient's ED course included: a personal history and physicial examination, re-evaluation prior to disposition, multiple bedside re-evaluations, IV medications, cardiac monitoring, continuous pulse oximetry and complex medical decision making and emergency management    This patient has remained unchanged and been closely monitored during their ED course. Please note that the withdrawal or failure to initiate urgent interventions for this patient would likely result in a life threatening deterioration or permanent disability. Accordingly this patient received 30 minutes of critical care time, excluding separately billable procedures. Clinical Impression  1. Septic shock ([address]    2. Pneumonia of both lungs due to infectious organism, unspecified part of lung    3. Acute respiratory failure with hypoxia (HCC)    4. Hx of cardiomyopathy          Disposition  Patient's disposition: Admit to CCU/ICU  Patient's condition is serious.        Lincoln Kern DO  08/12/21 1836

## 2021-08-12 NOTE — ED NOTES
Bed: 10  Expected date:   Expected time:   Means of arrival:   Comments:  Adam Milan RN  08/12/21 7629

## 2021-08-12 NOTE — PLAN OF CARE
Problem: Falls - Risk of:  Goal: Will remain free from falls  Description: Will remain free from falls  Outcome: Met This Shift  Goal: Absence of physical injury  Description: Absence of physical injury  Outcome: Met This Shift     Problem: Airway Clearance - Ineffective:  Goal: Ability to maintain a clear airway will improve  Description: Ability to maintain a clear airway will improve  Outcome: Met This Shift

## 2021-08-13 LAB
ANION GAP SERPL CALCULATED.3IONS-SCNC: 6 MMOL/L (ref 7–16)
BASOPHILS ABSOLUTE: 0.07 E9/L (ref 0–0.2)
BASOPHILS RELATIVE PERCENT: 0.3 % (ref 0–2)
BUN BLDV-MCNC: 36 MG/DL (ref 6–23)
BURR CELLS: ABNORMAL
CALCIUM SERPL-MCNC: 8.7 MG/DL (ref 8.6–10.2)
CHLORIDE BLD-SCNC: 103 MMOL/L (ref 98–107)
CO2: 26 MMOL/L (ref 22–29)
CREAT SERPL-MCNC: 1.9 MG/DL (ref 0.7–1.2)
EKG ATRIAL RATE: 75 BPM
EKG Q-T INTERVAL: 444 MS
EKG QRS DURATION: 196 MS
EKG QTC CALCULATION (BAZETT): 495 MS
EKG R AXIS: -83 DEGREES
EKG T AXIS: 76 DEGREES
EKG VENTRICULAR RATE: 75 BPM
EOSINOPHILS ABSOLUTE: 0.03 E9/L (ref 0.05–0.5)
EOSINOPHILS RELATIVE PERCENT: 0.1 % (ref 0–6)
GFR AFRICAN AMERICAN: 41
GFR NON-AFRICAN AMERICAN: 34 ML/MIN/1.73
GLUCOSE BLD-MCNC: 131 MG/DL (ref 74–99)
HCT VFR BLD CALC: 38.3 % (ref 37–54)
HEMOGLOBIN: 12.7 G/DL (ref 12.5–16.5)
IMMATURE GRANULOCYTES #: 1.1 E9/L
IMMATURE GRANULOCYTES %: 4.7 % (ref 0–5)
L. PNEUMOPHILA SEROGP 1 UR AG: NORMAL
LYMPHOCYTES ABSOLUTE: 1.34 E9/L (ref 1.5–4)
LYMPHOCYTES RELATIVE PERCENT: 5.8 % (ref 20–42)
MAGNESIUM: 1.8 MG/DL (ref 1.6–2.6)
MCH RBC QN AUTO: 32.6 PG (ref 26–35)
MCHC RBC AUTO-ENTMCNC: 33.2 % (ref 32–34.5)
MCV RBC AUTO: 98.2 FL (ref 80–99.9)
METER GLUCOSE: 116 MG/DL (ref 74–99)
MONOCYTES ABSOLUTE: 1.1 E9/L (ref 0.1–0.95)
MONOCYTES RELATIVE PERCENT: 4.7 % (ref 2–12)
NEUTROPHILS ABSOLUTE: 19.6 E9/L (ref 1.8–7.3)
NEUTROPHILS RELATIVE PERCENT: 84.4 % (ref 43–80)
OVALOCYTES: ABNORMAL
PDW BLD-RTO: 13.9 FL (ref 11.5–15)
PHOSPHORUS: 3.5 MG/DL (ref 2.5–4.5)
PLATELET # BLD: 167 E9/L (ref 130–450)
PMV BLD AUTO: 11.9 FL (ref 7–12)
POIKILOCYTES: ABNORMAL
POTASSIUM REFLEX MAGNESIUM: 4.4 MMOL/L (ref 3.5–5)
PROCALCITONIN: 2.51 NG/ML (ref 0–0.08)
PROCALCITONIN: 2.8 NG/ML (ref 0–0.08)
RBC # BLD: 3.9 E12/L (ref 3.8–5.8)
SODIUM BLD-SCNC: 135 MMOL/L (ref 132–146)
STREP PNEUMONIAE ANTIGEN, URINE: NORMAL
WBC # BLD: 23.2 E9/L (ref 4.5–11.5)

## 2021-08-13 PROCEDURE — 6360000002 HC RX W HCPCS: Performed by: INTERNAL MEDICINE

## 2021-08-13 PROCEDURE — 2500000003 HC RX 250 WO HCPCS: Performed by: INTERNAL MEDICINE

## 2021-08-13 PROCEDURE — 6370000000 HC RX 637 (ALT 250 FOR IP): Performed by: INTERNAL MEDICINE

## 2021-08-13 PROCEDURE — 93010 ELECTROCARDIOGRAM REPORT: CPT | Performed by: INTERNAL MEDICINE

## 2021-08-13 PROCEDURE — 84145 PROCALCITONIN (PCT): CPT

## 2021-08-13 PROCEDURE — 36592 COLLECT BLOOD FROM PICC: CPT

## 2021-08-13 PROCEDURE — 85025 COMPLETE CBC W/AUTO DIFF WBC: CPT

## 2021-08-13 PROCEDURE — 83735 ASSAY OF MAGNESIUM: CPT

## 2021-08-13 PROCEDURE — 99233 SBSQ HOSP IP/OBS HIGH 50: CPT | Performed by: INTERNAL MEDICINE

## 2021-08-13 PROCEDURE — 87449 NOS EACH ORGANISM AG IA: CPT

## 2021-08-13 PROCEDURE — 6370000000 HC RX 637 (ALT 250 FOR IP): Performed by: FAMILY MEDICINE

## 2021-08-13 PROCEDURE — 2580000003 HC RX 258: Performed by: INTERNAL MEDICINE

## 2021-08-13 PROCEDURE — 36415 COLL VENOUS BLD VENIPUNCTURE: CPT

## 2021-08-13 PROCEDURE — 82962 GLUCOSE BLOOD TEST: CPT

## 2021-08-13 PROCEDURE — 2060000000 HC ICU INTERMEDIATE R&B

## 2021-08-13 PROCEDURE — 84100 ASSAY OF PHOSPHORUS: CPT

## 2021-08-13 PROCEDURE — 80048 BASIC METABOLIC PNL TOTAL CA: CPT

## 2021-08-13 RX ORDER — SODIUM CHLORIDE, SODIUM LACTATE, POTASSIUM CHLORIDE, CALCIUM CHLORIDE 600; 310; 30; 20 MG/100ML; MG/100ML; MG/100ML; MG/100ML
INJECTION, SOLUTION INTRAVENOUS CONTINUOUS
Status: DISCONTINUED | OUTPATIENT
Start: 2021-08-13 | End: 2021-08-14

## 2021-08-13 RX ORDER — GUAIFENESIN 400 MG/1
400 TABLET ORAL 2 TIMES DAILY PRN
Status: DISCONTINUED | OUTPATIENT
Start: 2021-08-13 | End: 2021-08-15 | Stop reason: HOSPADM

## 2021-08-13 RX ORDER — DOXYCYCLINE HYCLATE 100 MG/1
100 CAPSULE ORAL EVERY 12 HOURS SCHEDULED
Status: DISCONTINUED | OUTPATIENT
Start: 2021-08-13 | End: 2021-08-15 | Stop reason: HOSPADM

## 2021-08-13 RX ADMIN — DOXYCYCLINE HYCLATE 100 MG: 100 CAPSULE ORAL at 18:16

## 2021-08-13 RX ADMIN — Medication 400 MG: at 09:12

## 2021-08-13 RX ADMIN — APIXABAN 5 MG: 5 TABLET, FILM COATED ORAL at 21:55

## 2021-08-13 RX ADMIN — PANTOPRAZOLE SODIUM 40 MG: 40 TABLET, DELAYED RELEASE ORAL at 06:00

## 2021-08-13 RX ADMIN — SODIUM CHLORIDE, POTASSIUM CHLORIDE, SODIUM LACTATE AND CALCIUM CHLORIDE: 600; 310; 30; 20 INJECTION, SOLUTION INTRAVENOUS at 11:23

## 2021-08-13 RX ADMIN — ZINC SULFATE 220 MG (50 MG) CAPSULE 50 MG: CAPSULE at 09:45

## 2021-08-13 RX ADMIN — CEFEPIME HYDROCHLORIDE 1000 MG: 1 INJECTION, POWDER, FOR SOLUTION INTRAMUSCULAR; INTRAVENOUS at 08:05

## 2021-08-13 RX ADMIN — WATER 2000 MG: 1 INJECTION INTRAMUSCULAR; INTRAVENOUS; SUBCUTANEOUS at 16:05

## 2021-08-13 RX ADMIN — GUAIFENESIN 400 MG: 400 TABLET ORAL at 09:44

## 2021-08-13 RX ADMIN — APIXABAN 5 MG: 5 TABLET, FILM COATED ORAL at 09:21

## 2021-08-13 RX ADMIN — DOXYCYCLINE 100 MG: 100 INJECTION, POWDER, LYOPHILIZED, FOR SOLUTION INTRAVENOUS at 06:30

## 2021-08-13 ASSESSMENT — PAIN SCALES - GENERAL
PAINLEVEL_OUTOF10: 0

## 2021-08-13 NOTE — CONSULTS
Department of Internal Medicine  Division of Pulmonary, Critical Care and Sleep Medicine  ICU Attending Addendum    Attending Physician Statement  Laverne Nugent was seen, examined and discussed with the multi-disciplinary ICU team during rounds. I have personally seen and examined the patient and the key elements of the encounter were performed by me. The medications & laboratory data was discussed and adjusted where necessary. The radiographic images were reviewed either as a group or with radiologist if felt dis-concordant with the exam or history. The above findings were corroborated, plans confirmed and changes made if needed. Family is updated at the bedside if available. Key issues of the case were discussed among consultants. Critical Care time is documented if appropriate. Changes to the notes are place in italicized print.      Briefly,   CAP, Sepsis  SUZIE    Off pressors  IVF, LR x 24 hrs  ATBx, change to Rocephin + Doxy  Sputum GS/C and PCT ( 2.5)    H/o Afib, CHF  H/o HTN  Hold bumex, ACE-I and spironolactone  Re-start eliquis and BB in am    Ok to d/c ICU

## 2021-08-13 NOTE — PROGRESS NOTES
.Patient admitted from er to 1 with the following belongings shorts/tshirt/slippers/socks and cell phone. , placed on monitor, patient oriented to room and unit visiting hours. Patient guide at bedside, reviewed patient rights and responsibilities. MRSA nasal swab obtained. Bed alarm on. Call light within reach. He doesn't wish to lock anything up. Has a wallet in his pocket.

## 2021-08-13 NOTE — PATIENT CARE CONFERENCE
.  Intensive Care Daily Quality Rounding Checklist      ICU Team Members: Dr. Estefania Mcadams, Kala  [facility] (residents), clinical pharmacist, charge nurse, bedside nurse, respiratory therapist    ICU Day #: NUMBER: 2    Intubation Date:  n/a    Ventilator Day #: n/a    Central Line Insertion Date: August 12        Day #: NUMBER: 2     Arterial Line Insertion Date:  n/a      Day #: n/a    Temporary Hemodialysis Catheter Insertion Date:  n/a      Day # n/a    DVT Prophylaxis: Eliquis    GI Prophylaxis: Protonix po    Maciel Catheter Insertion Date:  n/a       Day #: n/a      Continued need (if yes, reason documented and discussed with physician): n/a    Skin Issues/ Wounds and ordered treatment discussed on rounds: no issues    Goals/ Plans for the Day: Daily labs, up ad leroy, hold antihypertensives, transfer to floor today

## 2021-08-13 NOTE — PLAN OF CARE
Problem: Skin Integrity:  Goal: Will show no infection signs and symptoms  Description: Will show no infection signs and symptoms  Outcome: Met This Shift  Goal: Absence of new skin breakdown  Description: Absence of new skin breakdown  Outcome: Met This Shift     Problem: Falls - Risk of:  Goal: Will remain free from falls  Description: Will remain free from falls  Outcome: Met This Shift  Goal: Absence of physical injury  Description: Absence of physical injury  Outcome: Met This Shift     Problem: Airway Clearance - Ineffective:  Goal: Ability to maintain a clear airway will improve  Description: Ability to maintain a clear airway will improve  Outcome: Met This Shift

## 2021-08-13 NOTE — PROGRESS NOTES
[facility] Progress Note    Admitting Date and Time: 8/12/2021  2:56 PM  Admit Dx: Acute respiratory failure with hypoxia ([address] [J96.01]  Hx of cardiomyopathy [Z86.79]  Septic shock (HCC) [A41.9, R65.21]  Pneumonia of both lungs due to infectious organism, unspecified part of lung [J18.9]    Subjective:  Patient is being followed for Acute respiratory failure with hypoxia ([address] [J96.01]  Hx of cardiomyopathy [Z86.79]  Septic shock ([address] [A41.9, R65.21]  Pneumonia of both lungs due to infectious organism, unspecified part of lung [J18.9]   Patient states that he is feeling much better. No fevers throughout hospitalization stay. Patient has not required any pressors. Patient has been downgraded from the ICU.    ROS: denies fever, chills, cp, sob, n/v, HA unless stated above.       magnesium oxide  400 mg Oral Daily    cefTRIAXone (ROCEPHIN) IV  2,000 mg Intravenous Q24H    doxycycline  100 mg Oral 2 times per day    apixaban  5 mg Oral BID    pantoprazole  40 mg Oral QAM AC    zinc sulfate  50 mg Oral Daily    sodium chloride flush  10 mL Intravenous 2 times per day     guaiFENesin, 400 mg, BID PRN  sodium chloride flush, 10 mL, PRN  sodium chloride, 25 mL, PRN  polyethylene glycol, 17 g, Daily PRN  acetaminophen, 650 mg, Q6H PRN   Or  acetaminophen, 650 mg, Q6H PRN         Objective:    /63   Pulse 69   Temp 97.5 °F (36.4 °C) (Oral)   Resp 24   Ht 5' 10\" (1.778 m)   Wt 250 lb (113.4 kg)   SpO2 96%   BMI 35.87 kg/m²     General Appearance: alert and oriented to person, place and time and in no acute distress  Skin: warm and dry  Head: normocephalic and atraumatic  Eyes: pupils equal, round, and reactive to light, extraocular eye movements intact, conjunctivae normal  Neck: neck supple and non tender without mass   Pulmonary/Chest: diffuse rhonchi   Cardiovascular: normal rate, normal S1 and S2 and no carotid bruits  Abdomen: soft, non-tender, non-distended, normal bowel sounds, no masses or organomegaly  Extremities: no cyanosis, no clubbing and no edema  Neurologic: no cranial nerve deficit and speech normal        Recent Labs     08/12/21  1526 08/13/21  0530    135   K 4.1 4.4    103   CO2 23 26   BUN 31* 36*   CREATININE 1.9* 1.9*   GLUCOSE 131* 131*   CALCIUM 9.0 8.7       Recent Labs     08/12/21  1526 08/13/21  0530   WBC 25.7* 23.2*   RBC 4.58 3.90   HGB 15.0 12.7   HCT 43.6 38.3   MCV 95.2 98.2   MCH 32.8 32.6   MCHC 34.4 33.2   RDW 13.5 13.9    167   MPV 12.0 11.9       Radiology: No new imaging    Assessment:    Active Problems:    Congestive heart failure (HCC)    Atrial fibrillation (HCC)    Essential hypertension    Septic shock (HCC)  Resolved Problems:    * No resolved hospital problems. *      Plan:    1. Septic shock secondary to multifocal pneumonia -patient received 2 L NS in the ER. Currently he is on LR at 100 cc an hour. Levophed was ordered however not given as the map was greater than 65. Respiratory panel is negative. Respiratory culture shows gram-negative rods, gram-positive cocci, gram-positive rods. MRSA and blood cultures are currently pending. Patient received 2 g of Rocephin on August 12. He was started on 2 g of cefepime August 12 and doxycycline 100 mg IV twice daily on August 12. Cefepime has been deescalated to Rocephin today by intensivist.  Patient needs to complete 7-day course of antibiotics. Septic shock has resolved. She has received LR at 100 cc an hour for 1 more day. Monitor for CHF exacerbation as his ejection fraction is 35%. We will follow with procalcitonin  2. Acute hypoxic respiratory distress secondary to pneumonia -patient is currently on 3 L nasal cannula  3. Nonoliguric SUZIE on CKD stage II likely prerenal -Baseline appears to be 1.1. -Hold nephrotoxins including valsartan, Aldactone and lisinopril. Patient is to receive IV fluids for 24 hours. We will follow with BMP in the morning.   4. Paroxysmal atrial fibrillation -continue Eliquis. Toprol has been held due to hypotension  5. Hypertension -hold Diovan, Toprol, lisinopril  6. Chronic systolic CHF, EF is 29%, 4287 -hold Aldactone, lisinopril, Diovan, Toprol. Patient is thus far has a net +1 L fluid balance. Closely monitor fluid status 7. Status post pacemaker  7. DVT prophylaxis -Eliquis    PT OT does not seem to be necessary as patient is ambulatory. NOTE: This report was transcribed using voice recognition software. Every effort was made to ensure accuracy; however, inadvertent computerized transcription errors may be present.   Electronically signed by Yoselin Sawyer DO on 8/13/2021 at 12:33 PM

## 2021-08-13 NOTE — CARE COORDINATION
Social Work/Discharge Planning:  Met with patient and his daughter Jorge Dsouza and completed initial assessment. Explained Social Work role and discussed transition of care/discharge planning. COVID negative 8/12. Patient lives with girlfriend in a one story house. PTA he uses a cane as needed. He has a walker and shower chair. He had oxygen with [facility] when he had COVID, but recently returned equipment. He is currently requiring three liters of oxygen. He prefers to use [facility], IF oxygen is required at discharge. He denies any history with [facility] or snf. His PCP is Dr. Oly Cerda and is also seen by [facility]. He obtains he medications at the [facility]. He plans to return home at discharge. Will continue to follow and assist with discharge planning.   Electronically signed by JENNIFER Duran on 8/13/2021 at 12:29 PM

## 2021-08-13 NOTE — PLAN OF CARE
Problem: Skin Integrity:  Goal: Will show no infection signs and symptoms  Description: Will show no infection signs and symptoms  Outcome: Met This Shift  Goal: Absence of new skin breakdown  Description: Absence of new skin breakdown  Outcome: Met This Shift     Problem: Falls - Risk of:  Goal: Will remain free from falls  Description: Will remain free from falls  8/12/2021 2344 by Don Tao RN  Outcome: Met This Shift  8/12/2021 1935 by Don Tao RN  Outcome: Met This Shift  Goal: Absence of physical injury  Description: Absence of physical injury  8/12/2021 2344 by Don Tao RN  Outcome: Met This Shift  8/12/2021 1935 by Don Tao RN  Outcome: Met This Shift     Problem: Airway Clearance - Ineffective:  Goal: Ability to maintain a clear airway will improve  Description: Ability to maintain a clear airway will improve  8/12/2021 2344 by Don Tao RN  Outcome: Met This Shift  8/12/2021 1935 by Don Tao RN  Outcome: Met This Shift

## 2021-08-13 NOTE — CONSULTS
Critical Care Admit/Consult Note         Patient - Emily Sawyer   MRN -  [id number]   Acct # - [de-identified]   - 1940      Date of Admission -  2021  2:56 PM  Date of evaluation -  2021  [address] [facility] Day - 1            ADMIT/CONSULT DETAILS     Reason for Admit/Consult   Septic Shock    Consulting [facility] [address]  Primary Care Physician - DO CALVIN Saunders   The patient is a 80 y.o. male with significant past medical history of fibrillation, pacemaker, hypertension, CHF, COVID-19 infection 9 months ago presented to the ED with shortness of breath, cough, weakness. In the ED he was found to be hypotensive, hypoxic, and diaphoretic. Patient's blood pressure at that time was unresponsive to fluid administration and patient was admitted to the ICU. Levophed was not started. In the ED, patient received 2L saline bolus, rocephin, and magnesium. Past Medical History         Diagnosis Date    Apnea, sleep     Atrial fibrillation ([address]     Hypertension     Obesity     Pacemaker         Past Surgical History           Procedure Laterality Date    ABLATION OF DYSRHYTHMIC FOCUS      x4    FINGER AMPUTATION      x3 right hand    SHOULDER SURGERY      total replacement bilat         There is no immunization history on file for this patient.     Current Medications   Current Medications    apixaban  5 mg Oral BID    pantoprazole  40 mg Oral QAM AC    zinc sulfate  50 mg Oral Daily    sodium chloride flush  10 mL Intravenous 2 times per day    doxycycline (VIBRAMYCIN) IV  100 mg Intravenous Q12H    cefepime  1,000 mg Intravenous Q12H     sodium chloride flush, sodium chloride, polyethylene glycol, acetaminophen **OR** acetaminophen  IV Drips/Infusions   norepinephrine      sodium chloride      sodium chloride Stopped (21 0300)     Home Medications  Medications Prior to Admission: vitamin C (ASCORBIC ACID) 500 MG tablet, Take 2 tablets by mouth daily for 10 days  zinc sulfate (ORAZINC) 220 (50 Zn) MG capsule, Take 1 capsule by mouth daily  bumetanide (BUMEX) 1 MG tablet, Take 1 tablet by mouth 2 times daily  acetaminophen (TYLENOL) 500 MG tablet, Take 2 tablets by mouth every 8 hours as needed for Pain  apixaban (ELIQUIS) 5 MG TABS tablet, Take by mouth 2 times daily  lisinopril (PRINIVIL;ZESTRIL) 20 MG tablet, Take 1 tablet by mouth daily  vitamin B-12 (CYANOCOBALAMIN) 1000 MCG tablet, Take 1,000 mcg by mouth daily  cetirizine (ZYRTEC) 10 MG tablet, Take 10 mg by mouth daily. spironolactone (ALDACTONE) 25 MG tablet, Take 25 mg by mouth daily. metoprolol (TOPROL-XL) 100 MG XL tablet, Take 100 mg by mouth daily   valsartan (DIOVAN) 80 MG tablet, Take 80 mg by mouth daily  ibuprofen (IBU) 400 MG tablet, Take 1 tablet by mouth every 8 hours as needed for Pain Take with full glass of water  [DISCONTINUED] omeprazole (PRILOSEC) 20 MG delayed release capsule, Take 20 mg by mouth daily    Diet/Nutrition   ADULT DIET; Regular; No Added Salt (3-4 gm)    Allergies   Coumadin [warfarin sodium] and Heparin    Social History   Tobacco   reports that he has quit smoking. He has never used smokeless tobacco.    Alcohol     reports current alcohol use of about 14.0 standard drinks of alcohol per week. Family History   History reviewed. No pertinent family history.     ROS     REVIEW OF SYSTEMS:  CONSTITUTIONAL:  positive for  fatigue  EYES:  negative  HEENT:  negative  RESPIRATORY:  positive for  cough with sputum, dyspnea and wheezing  CARDIOVASCULAR:  positive for  fatigue  GASTROINTESTINAL:  negative  GENITOURINARY:  negative  MUSCULOSKELETAL:  negative  NEUROLOGICAL:  negative  BEHAVIOR/PSYCH:  negative    Mechanical Ventilation Data   VENT SETTINGS (Comprehensive)  Vent Information  SpO2: 97 %  Additional Respiratory  Assessments  Pulse: 69  Resp: (!) 41  SpO2: 97 %  Oral Care: Mouth swabbed    ABG  Lab Results   Component Value Date    PH 7.361 570   570   I. V.(mL/kg) 264(2.3)   264(2.3)   IV Piggyback(mL/kg) 24(0.2)   24(0.2)   Shift Total(mL/kg) 858(7.6)   858(7.6)   OUTPUT   Urine(mL/kg/hr) 200   200   Shift Total(mL/kg) 200(1.8)   200(1.8)   Weight (kg) 113.4 113.4 113.4 113.4     Patient Vitals for the past 96 hrs (Last 3 readings):   Weight   08/13/21 0000 250 lb (113.4 kg)   08/12/21 1917 250 lb (113.4 kg)   08/12/21 1449 250 lb (113.4 kg)       Exam     PHYSICAL EXAM:  CONSTITUTIONAL:  awake, alert, cooperative, no apparent distress, and appears stated age  EYES:  Lids and lashes normal, pupils equal, round and reactive to light, extra ocular muscles intact, sclera clear, conjunctiva normal  ENT:  Normocephalic, without obvious abnormality, atraumatic, NC O2 in place  NECK:  Supple, symmetrical, trachea midline, no adenopathy, thyroid symmetric, not enlarged and no tenderness, skin normal  LUNGS:  No increased work of breathing, good air exchange, clear to auscultation bilaterally with decreased breath sounds secondary to body habitus, no crackles or wheezing  CARDIOVASCULAR:  Normal apical impulse, regular rate and rhythm, normal S1 and S2, no S3 or S4, and no murmur noted  ABDOMEN:  No scars, normal bowel sounds, soft, non-distended, non-tender, no masses palpated, no hepatosplenomegally  MUSCULOSKELETAL:  There is no redness, warmth, or swelling of the joints. Full range of motion noted. Motor strength is 5 out of 5 all extremities bilaterally.   Tone is normal.  NEUROLOGIC:  Awake, alert, oriented to name, place and time  SKIN:  normal skin color, texture, turgor    Data   Old records and images have been reviewed    Lab Results   CBC     Lab Results   Component Value Date    WBC 23.2 08/13/2021    RBC 3.90 08/13/2021    HGB 12.7 08/13/2021    HCT 38.3 08/13/2021     08/13/2021    MCV 98.2 08/13/2021    MCH 32.6 08/13/2021    MCHC 33.2 08/13/2021    RDW 13.9 08/13/2021    NRBC 0.0 08/12/2021    METASPCT 0.9 08/12/2021    LYMPHOPCT 5.8 08/13/2021    PROMYELOPCT 0.9 08/12/2021    MONOPCT 4.7 08/13/2021    MYELOPCT 0.9 11/22/2020    BASOPCT 0.3 08/13/2021    MONOSABS 1.10 08/13/2021    LYMPHSABS 1.34 08/13/2021    EOSABS 0.03 08/13/2021    BASOSABS 0.07 08/13/2021       BMP   Lab Results   Component Value Date     08/13/2021    K 4.4 08/13/2021     08/13/2021    CO2 26 08/13/2021    BUN 36 08/13/2021    CREATININE 1.9 08/13/2021    GLUCOSE 131 08/13/2021    GLUCOSE 105 10/31/2010    CALCIUM 8.7 08/13/2021       LFTS  Lab Results   Component Value Date    ALKPHOS 88 08/12/2021    ALT 10 08/12/2021    AST 14 08/12/2021    PROT 7.3 08/12/2021    BILITOT 1.2 08/12/2021    BILIDIR 0.4 08/12/2021    IBILI 0.8 08/12/2021    LABALBU 3.8 08/12/2021       INR  No results for input(s): PROTIME, INR in the last 72 hours. APTT  No results for input(s): APTT in the last 72 hours. Lactic Acid  Lab Results   Component Value Date    LACTA 1.2 08/12/2021    LACTA 1.6 11/21/2020    LACTA 1.0 09/03/2019        BNP   No results for input(s): BNP in the last 72 hours. Cultures     No results for input(s): BC in the last 72 hours. No results for input(s): [place] in the last 72 hours. No results for input(s): LABURIN in the last 72 hours. Radiology   CXR  None today    CT Scans  None today    ASSESSMENT    Active Problems:    Congestive heart failure (HCC)    Atrial fibrillation (HCC)    Essential hypertension    Septic shock (HCC)  Resolved Problems:    * No resolved hospital problems. *       SYSTEMS ASSESSMENT    Neuro     Awake, alert, oriented x3, conversational    Respiratory     Acute respiratory failure with hypoxia due to pneumonia  Hypoxic in ED  Leukocytosis  CTA chest with ANASTACIO, RLL, RML consolidations, no evidence of PE  Currently saturating well on nasal cannula oxygen  Rocephin day 2  Doxycycline day 2  Mucinex for cough  Wean oxygen as tolerated.  Keep O2 sat 90-92%  Vaccinated for COVID    Cardiovascular     Septic Shock  In the ICU it was responsive to fluid administration  Pressors were not started  Telemetry monitoring    CHF  Has pacemaker, is ventricular paced  Tracks weight at home, no recent abrupt change  Does not appear to be volume overloaded, avoid diuretics given BP  BNP 3836, trend    HTN  Currently normotensive  Home blood pressure medications held    Elevated troponin  Troponin 37 trended down to 35  Low suspicion for ACS    Atrial Fibrillation  Currently rate controlled  Eliquis  Beta-blocker to resume tomorrow morning    Sleep Apnea  Bipap at night     Gastrointestinal     Pantoprazole for GI prophylaxis    No salt added general diet    Renal     SUZIE  Baseline appears to be around 1.0  Cr upon presentation 1.9, remains 1.9  Judicious use of fluids given CHF without current exacerbation and hypotension  Daily CMP    Hypophosphatemia  Replete as needed    Hypomagnesemia  Replete as needed     Infectious Disease     Sepsis secondary to pneumonia  Received rocephin in ED  Continue cefepime day 2  Continue doxycycline day 2    Hematology/Oncology     No current concerns    Endocrine     Contain blood sugars between 140 and 180    Social/Spiritual/DNR/Other     DNR-CCA    Yuval Moulton, DO PGY-2    \"Thank you for asking us to see this complex patient. \"

## 2021-08-14 LAB
ANION GAP SERPL CALCULATED.3IONS-SCNC: 8 MMOL/L (ref 7–16)
BASOPHILS ABSOLUTE: 0.05 E9/L (ref 0–0.2)
BASOPHILS RELATIVE PERCENT: 0.3 % (ref 0–2)
BUN BLDV-MCNC: 35 MG/DL (ref 6–23)
CALCIUM SERPL-MCNC: 8.6 MG/DL (ref 8.6–10.2)
CHLORIDE BLD-SCNC: 107 MMOL/L (ref 98–107)
CO2: 25 MMOL/L (ref 22–29)
CREAT SERPL-MCNC: 1.6 MG/DL (ref 0.7–1.2)
CULTURE, RESPIRATORY: NORMAL
DOHLE BODIES: ABNORMAL
EOSINOPHILS ABSOLUTE: 0.24 E9/L (ref 0.05–0.5)
EOSINOPHILS RELATIVE PERCENT: 1.4 % (ref 0–6)
GFR AFRICAN AMERICAN: 50
GFR NON-AFRICAN AMERICAN: 42 ML/MIN/1.73
GLUCOSE BLD-MCNC: 89 MG/DL (ref 74–99)
HCT VFR BLD CALC: 37.6 % (ref 37–54)
HEMOGLOBIN: 12.6 G/DL (ref 12.5–16.5)
IMMATURE GRANULOCYTES #: 0.44 E9/L
IMMATURE GRANULOCYTES %: 2.6 % (ref 0–5)
L. PNEUMOPHILA SEROGP 1 UR AG: NORMAL
LYMPHOCYTES ABSOLUTE: 1.14 E9/L (ref 1.5–4)
LYMPHOCYTES RELATIVE PERCENT: 6.7 % (ref 20–42)
MAGNESIUM: 1.7 MG/DL (ref 1.6–2.6)
MCH RBC QN AUTO: 33.1 PG (ref 26–35)
MCHC RBC AUTO-ENTMCNC: 33.5 % (ref 32–34.5)
MCV RBC AUTO: 98.7 FL (ref 80–99.9)
MONOCYTES ABSOLUTE: 0.95 E9/L (ref 0.1–0.95)
MONOCYTES RELATIVE PERCENT: 5.6 % (ref 2–12)
MRSA CULTURE ONLY: NORMAL
NEUTROPHILS ABSOLUTE: 14.15 E9/L (ref 1.8–7.3)
NEUTROPHILS RELATIVE PERCENT: 83.4 % (ref 43–80)
PDW BLD-RTO: 13.9 FL (ref 11.5–15)
PHOSPHORUS: 3.2 MG/DL (ref 2.5–4.5)
PLATELET # BLD: 172 E9/L (ref 130–450)
PMV BLD AUTO: 12.1 FL (ref 7–12)
POTASSIUM SERPL-SCNC: 4.1 MMOL/L (ref 3.5–5)
RBC # BLD: 3.81 E12/L (ref 3.8–5.8)
SMEAR, RESPIRATORY: NORMAL
SODIUM BLD-SCNC: 140 MMOL/L (ref 132–146)
STREP PNEUMONIAE ANTIGEN, URINE: NORMAL
WBC # BLD: 17 E9/L (ref 4.5–11.5)

## 2021-08-14 PROCEDURE — 2580000003 HC RX 258: Performed by: INTERNAL MEDICINE

## 2021-08-14 PROCEDURE — 83735 ASSAY OF MAGNESIUM: CPT

## 2021-08-14 PROCEDURE — 99233 SBSQ HOSP IP/OBS HIGH 50: CPT | Performed by: INTERNAL MEDICINE

## 2021-08-14 PROCEDURE — 6370000000 HC RX 637 (ALT 250 FOR IP): Performed by: INTERNAL MEDICINE

## 2021-08-14 PROCEDURE — 85025 COMPLETE CBC W/AUTO DIFF WBC: CPT

## 2021-08-14 PROCEDURE — 2060000000 HC ICU INTERMEDIATE R&B

## 2021-08-14 PROCEDURE — 80048 BASIC METABOLIC PNL TOTAL CA: CPT

## 2021-08-14 PROCEDURE — 84100 ASSAY OF PHOSPHORUS: CPT

## 2021-08-14 PROCEDURE — 6360000002 HC RX W HCPCS: Performed by: INTERNAL MEDICINE

## 2021-08-14 RX ADMIN — SODIUM CHLORIDE, PRESERVATIVE FREE 10 ML: 5 INJECTION INTRAVENOUS at 19:48

## 2021-08-14 RX ADMIN — ZINC SULFATE 220 MG (50 MG) CAPSULE 50 MG: CAPSULE at 08:19

## 2021-08-14 RX ADMIN — DOXYCYCLINE HYCLATE 100 MG: 100 CAPSULE ORAL at 08:19

## 2021-08-14 RX ADMIN — WATER 2000 MG: 1 INJECTION INTRAMUSCULAR; INTRAVENOUS; SUBCUTANEOUS at 17:43

## 2021-08-14 RX ADMIN — APIXABAN 5 MG: 5 TABLET, FILM COATED ORAL at 19:23

## 2021-08-14 RX ADMIN — Medication 400 MG: at 08:19

## 2021-08-14 RX ADMIN — APIXABAN 5 MG: 5 TABLET, FILM COATED ORAL at 08:19

## 2021-08-14 RX ADMIN — PANTOPRAZOLE SODIUM 40 MG: 40 TABLET, DELAYED RELEASE ORAL at 06:20

## 2021-08-14 RX ADMIN — DOXYCYCLINE HYCLATE 100 MG: 100 CAPSULE ORAL at 19:23

## 2021-08-14 ASSESSMENT — PAIN SCALES - GENERAL
PAINLEVEL_OUTOF10: 0
PAINLEVEL_OUTOF10: 0

## 2021-08-14 NOTE — PROGRESS NOTES
Pulmonary Progress Note  8/14/2021 11:36 AM  Subjective:   Admit Date: 8/12/2021  PCP: Rusty Becker DO  Interval History:   The patient is a 80 y.o. male with significant past medical history of fibrillation, pacemaker, hypertension, CHF, COVID-19 infection 9 months ago presented to the ED with shortness of breath, cough, weakness. In the ED he was found to be hypotensive, hypoxic, and diaphoretic. Patient's blood pressure at that time was unresponsive to fluid administration and patient was admitted to the ICU. Now, he has transferred out of ICU. Sitting in chair, daughter at bedside  Feels much better, still some dyspnea now coughing with tan/blood tinged sputum  On 2L nc  Would like to go home soon    Diet: ADULT DIET; Regular; No Added Salt (3-4 gm)      Data:   Scheduled Meds:    cefTRIAXone (ROCEPHIN) IV  2,000 mg Intravenous Q24H    doxycycline  100 mg Oral 2 times per day    apixaban  5 mg Oral BID    pantoprazole  40 mg Oral QAM AC    zinc sulfate  50 mg Oral Daily    sodium chloride flush  10 mL Intravenous 2 times per day       Continuous Infusions:    sodium chloride         PRN Meds: guaiFENesin, sodium chloride flush, sodium chloride, polyethylene glycol, acetaminophen **OR** acetaminophen    No intake/output data recorded. No intake/output data recorded.     No intake or output data in the 24 hours ending 08/14/21 1136    Patient Vitals for the past 96 hrs (Last 3 readings):   Weight   08/14/21 0106 261 lb 6.4 oz (118.6 kg)   08/13/21 0000 250 lb (113.4 kg)   08/12/21 1917 250 lb (113.4 kg)         Recent Labs     08/12/21  1526 08/13/21  0530 08/14/21  0335   WBC 25.7* 23.2* 17.0*   HGB 15.0 12.7 12.6   HCT 43.6 38.3 37.6   MCV 95.2 98.2 98.7    167 172     Recent Labs     08/12/21  1526 08/13/21  0530 08/14/21  0335    135 140   K 4.1 4.4 4.1    103 107   CO2 23 26 25   BUN 31* 36* 35*   CREATININE 1.9* 1.9* 1.6*   PHOS  --  3.5 3.2     Recent Labs 08/12/21  1526   PROT 7.3   ALKPHOS 88   ALT 10   AST 14   BILITOT 1.2     No results for input(s): INR, APTT in the last 72 hours. No results for input(s): CKTOTAL, CKMB, TROPONINI in the last 72 hours. No results for input(s): BNP in the last 72 hours. Troponin: No results for input(s): TROPONINI in the last 72 hours. CPK:  Lab Results   Component Value Date    CKTOTAL 45 11/21/2020      BNP: No results for input(s): BNP in the last 72 hours. ABGs: No results found for: PHART, PO2ART, YRY2XBO  INR: No results for input(s): INR in the last 72 hours. Results for Heriberto Guzman (MRN [id number]) as of 8/12/2021   Ref.  Range 8/12/2021 20:20   Influenza A by PCR Latest Ref Range: Not Detected  Not Detected   Influenza B by PCR Latest Ref Range: Not Detected  Not Detected   Adenovirus by PCR Latest Ref Range: Not Detected  Not Detected   Coronavirus 229E by PCR Latest Ref Range: Not Detected  Not Detected   Coronavirus HKU1 by PCR Latest Ref Range: Not Detected  Not Detected   Coronavirus NL63 by PCR Latest Ref Range: Not Detected  Not Detected   Coronavirus OC43 by PCR Latest Ref Range: Not Detected  Not Detected   Human Metapneumovirus by PCR Latest Ref Range: Not Detected  Not Detected   Human Rhinovirus/Enterovirus by PCR Latest Ref Range: Not Detected  Not Detected   Parainfluenza Virus 1 by PCR Latest Ref Range: Not Detected  Not Detected   Parainfluenza Virus 2 by PCR Latest Ref Range: Not Detected  Not Detected   Parainfluenza Virus 3 by PCR Latest Ref Range: Not Detected  Not Detected   Parainfluenza Virus 4 by PCR Latest Ref Range: Not Detected  Not Detected   Respiratory Syncytial Virus by PCR Latest Ref Range: Not Detected  Not Detected   Bordetella parapertussis by PCR Latest Ref Range: Not Detected  Not Detected   Chlamydophilia pneumoniae by PCR Latest Ref Range: Not Detected  Not Detected   Mycoplasma pneumoniae by PCR Latest Ref Range: Not Detected  Not Detected   SARS-CoV-2, PCR Latest Ref Range: Not Detected  Not Detected   Bordetella pertussis by PCR Latest Ref Range: Not Detected  Not Detected       21 Respiratory culture:   Group 6: <25 PMN's/LPF and <25 Epithelial cells/LPF   Few Polymorphonuclear leukocytes   Rare Epithelial cells   Rare yeast   Few Gram negative rods   Few Gram positive cocci in pairs   Few Gram positive cocci in clusters   Rare gram positive rods Diphtheroid like   -----------------------------------------------------------------  RAD:   Results for orders placed during the hospital encounter of 21    XR CHEST PORTABLE 2021: Stable left upper lobe consolidation there is no effusion or pneumothorax. Cardiomegaly. The osseous structures are without acute process. Right-sided internal jugular line tip in the SVC. Left-sided transvenous pacer device. Bilateral shoulder arthroplasties. Impression Right internal jugular line tip in the distal SVC. No pneumothorax. Stable left upper lobe opacity. Micro:  Recent Labs     21  1526   BC 24 Hours no growth     No results for input(s): ORG in the last 72 hours. Recent Labs     21  1555   BLOODCULT2 24 Hours no growth     No results for input(s): STREPPNEUMAG in the last 72 hours. No results for input(s): LEGUR in the last 72 hours. No results for input(s): ORG in the last 72 hours. No results for input(s): ASO in the last 72 hours. Recent Labs     21   CULTRESP Oral Pharyngeal Tanisha reduced     No results for input(s): Wandalee Khat in the last 72 hours.   Vent Information  Skin Assessment: Clean, dry, & intact  SpO2: 96 %    Additional Respiratory  Assessments  Pulse: 72  Resp: 20  SpO2: 96 %  Oral Care: Teeth brushed, Denture care, Mouthwash    Objective:   Vitals:   Vitals:    21 0815   BP: 133/66   Pulse: 72   Resp: 20   Temp: 98.2 °F (36.8 °C)   SpO2: 96%      TEMP:Current: Temp: 98.2 °F (36.8 °C)  Max: Temp  Av.9 °F (36.6 °C)  Min: 97.7 °F (36.5 °C)  Max: 98.2 °F (36.8 °C)    BP Range: Systolic (81IYJ), LCP:873 , Min:113 , ONX:091     Diastolic (51IJE), LOT:92, Min:55, Max:67      General appearance: alert, appears stated age and cooperative. No acute distress  Skin: No rashes or lesions  HEENT: mucous membranes are moist  Neck: No JVD  Lungs: symmetrical expansion, diminished with exp wheezing, no use of accessory muscles  Heart: S1S2 no murmurs,  Abdomen: soft, non tender   Extremities: 1-2+ BLE peripheral edema  Neurologic: Alert, oriented and follows commands  Affect: pleasant      Assessment:   Patient Active Problem List:     Congestive heart failure (HCC)     CHF (congestive heart failure), NYHA class I, acute on chronic, combined ([facility]     Acute respiratory failure due to COVID-19 [facility])     Atrial fibrillation ([facility])     Essential hypertension     Diarrhea of presumed infectious origin     Acute hypoxemic respiratory failure due to pneumonia     Septic shock ([facility] [address]    Plan:   1. Continue O2, currently 2L nc. Wean to keep pox >90% - baseline is room air   2. Continue Rocephin and Doxy (to complete 8/19) - procal 2.8 (8/13), 2.51 (8/12), repeat Monday   3. Legionella and strep pneumoniae negative  4. On Eliquis  5.  IS    MANUEL Phelan - CNP     Seen and evaluated, agree with above assessment and plan  Feeling better, afebrile  Lower white blood cell count and FiO2 needs  Continue antibiotics  Procalcitonin and home O2 evaluation Monday

## 2021-08-14 NOTE — PROGRESS NOTES
[facility] Quality Flow/Interdisciplinary Rounds Progress Note        Quality Flow Rounds held on August 14, 2021    Disciplines Attending:  Bedside Nurse, ,  and Nursing Unit Leadership    Alex Justice was admitted on 8/12/2021  2:56 PM    Anticipated Discharge Date:  Expected Discharge Date: 08/16/21    Disposition:    Jayme Score:  Jayme Scale Score: 22    Readmission Risk              Risk of Unplanned Readmission:  10           Discussed patient goal for the day, patient clinical progression, and barriers to discharge. The following Goal(s) of the Day/Commitment(s) have been identified: Receiving Rocephin IV daily and vibramycin orally Q 12 hours, monitor vitals and intake/output, attempt to wean oxygen as tolerated, discharge planning to return home.       Alvin Monroe RN  August 14, 2021

## 2021-08-14 NOTE — PROGRESS NOTES
[facility] Progress Note    Admitting Date and Time: 8/12/2021  2:56 PM  Admit Dx: Acute respiratory failure with hypoxia ([address] [J96.01]  Hx of cardiomyopathy [Z86.79]  Septic shock (HCC) [A41.9, R65.21]  Pneumonia of both lungs due to infectious organism, unspecified part of lung [J18.9]    Subjective:  Patient is being followed for Acute respiratory failure with hypoxia ([address] [J96.01]  Hx of cardiomyopathy [Z86.79]  Septic shock ([address] [A41.9, R65.21]  Pneumonia of both lungs due to infectious organism, unspecified part of lung [J18.9]   Patient has no complaints at this time. Denies any fevers, chills, cough. ROS: denies fever, chills, cp, sob, n/v, HA unless stated above.       cefTRIAXone (ROCEPHIN) IV  2,000 mg Intravenous Q24H    doxycycline  100 mg Oral 2 times per day    apixaban  5 mg Oral BID    pantoprazole  40 mg Oral QAM AC    zinc sulfate  50 mg Oral Daily    sodium chloride flush  10 mL Intravenous 2 times per day     guaiFENesin, 400 mg, BID PRN  sodium chloride flush, 10 mL, PRN  sodium chloride, 25 mL, PRN  polyethylene glycol, 17 g, Daily PRN  acetaminophen, 650 mg, Q6H PRN   Or  acetaminophen, 650 mg, Q6H PRN         Objective:    /66   Pulse 72   Temp 98.2 °F (36.8 °C) (Oral)   Resp 20   Ht 5' 10\" (1.778 m)   Wt 261 lb 6.4 oz (118.6 kg)   SpO2 96%   BMI 37.51 kg/m²     General Appearance: alert and oriented to person, place and time and in no acute distress  Skin: warm and dry  Head: normocephalic and atraumatic  Eyes: pupils equal, round, and reactive to light, extraocular eye movements intact, conjunctivae normal  Neck: neck supple and non tender without mass   Pulmonary/Chest: clear to auscultation bilaterally- no wheezes, rales or rhonchi, normal air movement, no respiratory distress  Cardiovascular: normal rate, normal S1 and S2 and no carotid bruits  Abdomen: soft, non-tender, non-distended, normal bowel sounds, no masses or organomegaly  Extremities: no cyanosis, no clubbing and no edema  Neurologic: no cranial nerve deficit and speech normal        Recent Labs     08/12/21  1526 08/13/21  0530 08/14/21  0335    135 140   K 4.1 4.4 4.1    103 107   CO2 23 26 25   BUN 31* 36* 35*   CREATININE 1.9* 1.9* 1.6*   GLUCOSE 131* 131* 89   CALCIUM 9.0 8.7 8.6       Recent Labs     08/12/21  1526 08/13/21  0530 08/14/21  0335   WBC 25.7* 23.2* 17.0*   RBC 4.58 3.90 3.81   HGB 15.0 12.7 12.6   HCT 43.6 38.3 37.6   MCV 95.2 98.2 98.7   MCH 32.8 32.6 33.1   MCHC 34.4 33.2 33.5   RDW 13.5 13.9 13.9    167 172   MPV 12.0 11.9 12.1*       Radiology: No new imaging studies    Assessment:    Principal Problem:    Septic shock (HCC)  Active Problems:    Congestive heart failure (HCC)    Atrial fibrillation ([facility]    Essential hypertension  Resolved Problems:    * No resolved hospital problems. *      Plan:    1. Septic shock secondary to multifocal pneumonia - Septic shock resolved. Respiratory panel is negative. Respiratory culture shows gram-negative rods, gram-positive cocci, gram-positive rods. MRSA negative  and blood cultures are currently pending. Continue Rocephin and doxycycline until August 19. Follow-up procalcitonin on Monday. 2.  Acute hypoxic respiratory distress secondary to pneumonia -patient is currently on 2 L nasal cannula  3. Nonoliguric SUZIE on CKD stage II likely prerenal - Resolving. Baseline appears to be 1.1. Hold nephrotoxins including valsartan, Aldactone and lisinopril. We will follow with BMP in the morning. 4.  Paroxysmal atrial fibrillation -continue Eliquis. Toprol has been held due to hypotension  5. Hypertension -hold Diovan, Toprol, lisinopril  6. Chronic systolic CHF, EF is 21%, 0057 -hold Aldactone, lisinopril, Diovan, Toprol. Patient is thus far has a net +1 L fluid balance. Closely monitor fluid status 7. Status post pacemaker  7.   DVT prophylaxis -Eliquis     Possibly DC home tomorrow      NOTE: This report was transcribed using voice recognition software. Every effort was made to ensure accuracy; however, inadvertent computerized transcription errors may be present.   Electronically signed by  Hiren Ramos DO on 8/14/2021 at 1:41 PM

## 2021-08-15 VITALS
BODY MASS INDEX: 37.42 KG/M2 | WEIGHT: 261.4 LBS | OXYGEN SATURATION: 96 % | HEIGHT: 70 IN | TEMPERATURE: 97.6 F | SYSTOLIC BLOOD PRESSURE: 136 MMHG | RESPIRATION RATE: 18 BRPM | HEART RATE: 71 BPM | DIASTOLIC BLOOD PRESSURE: 77 MMHG

## 2021-08-15 LAB
ANION GAP SERPL CALCULATED.3IONS-SCNC: 7 MMOL/L (ref 7–16)
ATYPICAL LYMPHOCYTE RELATIVE PERCENT: 2.7 % (ref 0–4)
BASOPHILS ABSOLUTE: 0.09 E9/L (ref 0–0.2)
BASOPHILS RELATIVE PERCENT: 0.9 % (ref 0–2)
BUN BLDV-MCNC: 33 MG/DL (ref 6–23)
CALCIUM SERPL-MCNC: 9.3 MG/DL (ref 8.6–10.2)
CHLORIDE BLD-SCNC: 109 MMOL/L (ref 98–107)
CO2: 26 MMOL/L (ref 22–29)
CREAT SERPL-MCNC: 1.5 MG/DL (ref 0.7–1.2)
EOSINOPHILS ABSOLUTE: 0.7 E9/L (ref 0.05–0.5)
EOSINOPHILS RELATIVE PERCENT: 7.1 % (ref 0–6)
GFR AFRICAN AMERICAN: 54
GFR NON-AFRICAN AMERICAN: 45 ML/MIN/1.73
GLUCOSE BLD-MCNC: 129 MG/DL (ref 74–99)
HCT VFR BLD CALC: 39.6 % (ref 37–54)
HEMOGLOBIN: 12.8 G/DL (ref 12.5–16.5)
LYMPHOCYTES ABSOLUTE: 1.27 E9/L (ref 1.5–4)
LYMPHOCYTES RELATIVE PERCENT: 10.7 % (ref 20–42)
MAGNESIUM: 1.9 MG/DL (ref 1.6–2.6)
MCH RBC QN AUTO: 32.5 PG (ref 26–35)
MCHC RBC AUTO-ENTMCNC: 32.3 % (ref 32–34.5)
MCV RBC AUTO: 100.5 FL (ref 80–99.9)
MONOCYTES ABSOLUTE: 0.29 E9/L (ref 0.1–0.95)
MONOCYTES RELATIVE PERCENT: 2.7 % (ref 2–12)
MYELOCYTE PERCENT: 0.9 % (ref 0–0)
NEUTROPHILS ABSOLUTE: 7.45 E9/L (ref 1.8–7.3)
NEUTROPHILS RELATIVE PERCENT: 75 % (ref 43–80)
NUCLEATED RED BLOOD CELLS: 0.9 /100 WBC
PDW BLD-RTO: 13.9 FL (ref 11.5–15)
PHOSPHORUS: 2.9 MG/DL (ref 2.5–4.5)
PLATELET # BLD: 195 E9/L (ref 130–450)
PMV BLD AUTO: 11.4 FL (ref 7–12)
POTASSIUM SERPL-SCNC: 4.4 MMOL/L (ref 3.5–5)
PROCALCITONIN: 1.18 NG/ML (ref 0–0.08)
RBC # BLD: 3.94 E12/L (ref 3.8–5.8)
RBC # BLD: NORMAL 10*6/UL
SODIUM BLD-SCNC: 142 MMOL/L (ref 132–146)
WBC # BLD: 9.8 E9/L (ref 4.5–11.5)

## 2021-08-15 PROCEDURE — 99239 HOSP IP/OBS DSCHRG MGMT >30: CPT | Performed by: INTERNAL MEDICINE

## 2021-08-15 PROCEDURE — 6370000000 HC RX 637 (ALT 250 FOR IP): Performed by: INTERNAL MEDICINE

## 2021-08-15 PROCEDURE — 85025 COMPLETE CBC W/AUTO DIFF WBC: CPT

## 2021-08-15 PROCEDURE — 36415 COLL VENOUS BLD VENIPUNCTURE: CPT

## 2021-08-15 PROCEDURE — 84145 PROCALCITONIN (PCT): CPT

## 2021-08-15 PROCEDURE — 83735 ASSAY OF MAGNESIUM: CPT

## 2021-08-15 PROCEDURE — 2700000000 HC OXYGEN THERAPY PER DAY

## 2021-08-15 PROCEDURE — 2580000003 HC RX 258: Performed by: INTERNAL MEDICINE

## 2021-08-15 PROCEDURE — 80048 BASIC METABOLIC PNL TOTAL CA: CPT

## 2021-08-15 PROCEDURE — 84100 ASSAY OF PHOSPHORUS: CPT

## 2021-08-15 PROCEDURE — 6360000002 HC RX W HCPCS: Performed by: INTERNAL MEDICINE

## 2021-08-15 RX ORDER — AMOXICILLIN AND CLAVULANATE POTASSIUM 875; 125 MG/1; MG/1
1 TABLET, FILM COATED ORAL 2 TIMES DAILY
Qty: 10 TABLET | Refills: 0 | Status: SHIPPED | OUTPATIENT
Start: 2021-08-15 | End: 2021-08-20

## 2021-08-15 RX ORDER — GUAIFENESIN 400 MG/1
400 TABLET ORAL 2 TIMES DAILY PRN
Qty: 20 TABLET | Refills: 0 | Status: ON HOLD | OUTPATIENT
Start: 2021-08-15

## 2021-08-15 RX ORDER — DOXYCYCLINE HYCLATE 100 MG/1
100 CAPSULE ORAL EVERY 12 HOURS SCHEDULED
Qty: 10 CAPSULE | Refills: 0 | Status: SHIPPED | OUTPATIENT
Start: 2021-08-15 | End: 2021-08-20

## 2021-08-15 RX ADMIN — APIXABAN 5 MG: 5 TABLET, FILM COATED ORAL at 08:34

## 2021-08-15 RX ADMIN — ZINC SULFATE 220 MG (50 MG) CAPSULE 50 MG: CAPSULE at 08:34

## 2021-08-15 RX ADMIN — SODIUM CHLORIDE, PRESERVATIVE FREE 10 ML: 5 INJECTION INTRAVENOUS at 08:34

## 2021-08-15 RX ADMIN — WATER 2000 MG: 1 INJECTION INTRAMUSCULAR; INTRAVENOUS; SUBCUTANEOUS at 15:29

## 2021-08-15 RX ADMIN — PANTOPRAZOLE SODIUM 40 MG: 40 TABLET, DELAYED RELEASE ORAL at 05:24

## 2021-08-15 RX ADMIN — DOXYCYCLINE HYCLATE 100 MG: 100 CAPSULE ORAL at 08:34

## 2021-08-15 ASSESSMENT — PAIN SCALES - GENERAL: PAINLEVEL_OUTOF10: 0

## 2021-08-15 NOTE — DISCHARGE SUMMARY
[facility] Physician Discharge Summary       Tamar Gilman MD  [address] [facility] [address] [facility]  [address] [facility] 209 [id number] [address]    Schedule an appointment as soon as possible for a visit in 3 weeks  HOSPITAL FOLLOW-UP    Francis Ortiz DO  [address] [place] [address]     Call in 1 week  HOSPITAL FOLLOW-UP      Activity level: As tolerated     Dispo: Home    Condition on discharge: Stable     Patient ID:  Naomy Bauman  [id number]  8 y.o.  1940    Admit date: 8/12/2021    Discharge date and time:  8/15/2021  3:02 PM    Admission Diagnoses: Principal Problem:    Septic shock [facility])  Active Problems:    Congestive heart failure ([address]    Atrial fibrillation ([address]    Essential hypertension  Resolved Problems:    * No resolved hospital problems. *      Discharge Diagnoses: Principal Problem:    Septic shock ([address]  Active Problems:    Congestive heart failure (HCC)    Atrial fibrillation ([address]    Essential hypertension  Resolved Problems:    * No resolved hospital problems. *      Consults:  IP CONSULT TO CRITICAL CARE    Procedures: None    Hospital Course:   Patient Naomy Bauman is a 80 y.o. presented with Acute respiratory failure with hypoxia ([address] [J96.01]  Hx of cardiomyopathy [Z86.79]  Septic shock (HCC) [A41.9, R65.21]  Pneumonia of both lungs due to infectious organism, unspecified part of lung [J259]  80year old male presents to the hospital in respiratory distress and was found to have multifocal pneumonia. He was admitted to the ICU. COVID testing was done and was negative. Respiratory panel was negative. Blood culture is thus far negative. Patient was started on empiric antibiotics and was weaned down. Patient was slowly weaned down off of oxygen. He is medically stable for discharge.     Discharge Exam:    General Appearance: alert and oriented to person, place and time and in no acute distress  Skin: warm and dry  Head: normocephalic and atraumatic  Eyes: pupils equal, round, and reactive to light, extraocular eye movements intact, conjunctivae normal  Neck: neck supple and non tender without mass   Pulmonary/Chest: clear to auscultation bilaterally- no wheezes, rales or rhonchi, normal air movement, no respiratory distress  Cardiovascular: normal rate, normal S1 and S2 and no carotid bruits  Abdomen: soft, non-tender, non-distended, normal bowel sounds, no masses or organomegaly  Extremities: no cyanosis, no clubbing and no edema  Neurologic: no cranial nerve deficit and speech normal    No intake/output data recorded. No intake/output data recorded. LABS:  Recent Labs     08/13/21  0530 08/14/21  0335 08/15/21  0521    140 142   K 4.4 4.1 4.4    107 109*   CO2 26 25 26   BUN 36* 35* 33*   CREATININE 1.9* 1.6* 1.5*   GLUCOSE 131* 89 129*   CALCIUM 8.7 8.6 9.3       Recent Labs     08/13/21  0530 08/14/21  0335 08/15/21  0521   WBC 23.2* 17.0* 9.8   RBC 3.90 3.81 3.94   HGB 12.7 12.6 12.8   HCT 38.3 37.6 39.6   MCV 98.2 98.7 100.5*   MCH 32.6 33.1 32.5   MCHC 33.2 33.5 32.3   RDW 13.9 13.9 13.9    172 195   MPV 11.9 12.1* 11.4       No results for input(s): POCGLU in the last 72 hours. Imaging:  CT HEAD WO CONTRAST    Result Date: 8/12/2021  EXAMINATION: CT OF THE HEAD WITHOUT CONTRAST  8/12/2021 3:52 pm TECHNIQUE: CT of the head was performed without the administration of intravenous contrast. Dose modulation, iterative reconstruction, and/or weight based adjustment of the mA/kV was utilized to reduce the radiation dose to as low as reasonably achievable. COMPARISON: October 31, 2010 HISTORY: ORDERING SYSTEM PROVIDED HISTORY: new mass in lung, concern for brain metastasis TECHNOLOGIST PROVIDED HISTORY: Has a \"code stroke\" or \"stroke alert\" been called? ->No Reason for exam:->new mass in lung, concern for brain metastasis Decision Support Exception - unselect if not a suspected or confirmed emergency medical condition->Emergency Medical Condition (MA) FINDINGS: BRAIN/VENTRICLES: There are age related cortical atrophy and periventricular white matter ischemic changes. There is no acute intracranial hemorrhage, mass effect or midline shift. No abnormal extra-axial fluid collection. The gray-white differentiation is maintained without evidence of an acute infarct. There is no evidence of hydrocephalus. ORBITS: The visualized portion of the orbits demonstrate no acute abnormality. SINUSES: The visualized paranasal sinuses and mastoid air cells demonstrate no acute abnormality. SOFT TISSUES/SKULL:  No acute abnormality of the visualized skull or soft tissues. No acute intracranial abnormality. No evidence of metastatic disease on this study limited by absence of intravenous contrast.     XR CHEST PORTABLE    Result Date: 8/12/2021  EXAMINATION: ONE XRAY VIEW OF THE CHEST 8/12/2021 6:29 pm COMPARISON: None. HISTORY: ORDERING SYSTEM PROVIDED HISTORY: line placement confirmation TECHNOLOGIST PROVIDED HISTORY: Reason for exam:->line placement confirmation FINDINGS: Stable left upper lobe consolidation there is no effusion or pneumothorax. Cardiomegaly. The osseous structures are without acute process. Right-sided internal jugular line tip in the SVC. Left-sided transvenous pacer device. Bilateral shoulder arthroplasties. Right internal jugular line tip in the distal SVC. No pneumothorax. Stable left upper lobe opacity. CTA CHEST W CONTRAST    Result Date: 8/12/2021  EXAMINATION: CTA OF THE CHEST 8/12/2021 3:25 pm TECHNIQUE: CTA of the chest was performed after the administration of intravenous contrast.  Multiplanar reformatted images are provided for review. MIP images are provided for review. Dose modulation, iterative reconstruction, and/or weight based adjustment of the mA/kV was utilized to reduce the radiation dose to as low as reasonably achievable. COMPARISON: None.  HISTORY: ORDERING SYSTEM PROVIDED HISTORY: rule out dissection TECHNOLOGIST PROVIDED HISTORY: Reason for exam:->rule out dissection Decision Support Exception - unselect if not a suspected or confirmed emergency medical condition->Emergency Medical Condition (MA) FINDINGS: Pulmonary Arteries: Pulmonary arteries are adequately opacified for evaluation. No evidence of intraluminal filling defect to suggest pulmonary embolism. Main pulmonary artery is normal in caliber. Mediastinum: No evidence of mediastinal lymphadenopathy. The heart and pericardium demonstrate no acute abnormality. The thoracic aorta is normal caliber. There is no dissection or aneurysm of the thoracic aorta. Lungs/pleura: There is an area of consolidation seen within the right lower lobe. The area of consolidation is difficult to measure given the configuration. The right lower lobe consolidation measures approximately 8.1 by 5.3 cm. There is also a small area of consolidation seen within the right middle lobe which measures approximately 2.0 x 1.4 cm. Within the left upper lobe there is a rounded area of dense consolidation measuring 5.0 x 4.7 cm. The rounded area of consolidation extends into the right hilum. The left lower lobe is clear. Upper Abdomen: Limited images of the upper abdomen are unremarkable. Soft Tissues/Bones:  Age related degenerative changes of the visualized osseous structures without focal destructive lesion. 1.  Multifocal areas of consolidation seen within the left upper lobe, right lower lobe and to a lesser extent within the right middle lobe. The area of consolidation within the left upper lobe is more rounded in appearance and measures approximately 5 cm x 4.7 cm. Given the multiplicity and appearance of the consolidation within the right lower lobe I would favor these findings to represent pneumonia. Malignancy within the left upper lobe is less likely but cannot be completely excluded.   Dedicated follow-up chest x-ray is recommended in 2-3 weeks to confirm reduction in size or resolution and exclude underlying pathology. 2.  There is no evidence of a thoracic aortic aneurysm or dissection 3. There is no pulmonary embolus. CTA ABDOMEN PELVIS W CONTRAST    Result Date: 8/12/2021  EXAMINATION: CTA OF THE ABDOMEN AND PELVIS WITH CONTRAST 8/12/2021 3:25 pm: TECHNIQUE: CTA of the abdomen and pelvis was performed with the administration of intravenous contrast. Multiplanar reformatted images are provided for review. MIP images are provided for review. Dose modulation, iterative reconstruction, and/or weight based adjustment of the mA/kV was utilized to reduce the radiation dose to as low as reasonably achievable. COMPARISON: None. HISTORY: ORDERING SYSTEM PROVIDED HISTORY: eval for dissection TECHNOLOGIST PROVIDED HISTORY: Reason for exam:->eval for dissection Decision Support Exception - unselect if not a suspected or confirmed emergency medical condition->Emergency Medical Condition (MA) FINDINGS: Please see the CT of the chest report regarding the multifocal lung disease. The liver, spleen, pancreas and adrenal glands are unremarkable. There is no right or left obstructive uropathy. There is a well-defined left renal cyst measuring 7.0 by 4.8 cm. A 1.5 cm right renal cyst is noted. There is bilateral renal cortical thinning and bilateral perirenal stranding most consistent with chronic renal disease. There is no hydroureter. The gallbladder is normally distended. There is no gastric distention. There is no evidence of bowel obstruction. No evidence of abnormal bowel wall thickening or distension. There are few scattered sigmoid diverticula. There is no evidence of diverticulitis. There are degenerative changes of the lumbar spine CTA ABDOMEN: The abdominal aorta is normal caliber. There is no aneurysm or dissection. The celiac artery, superior mesenteric artery and inferior mesenteric arteries are widely patent.   The renal arteries are Where to Get Your Medications      You can get these medications from any pharmacy    Bring a paper prescription for each of these medications  · amoxicillin-clavulanate 875-125 MG per tablet  · doxycycline hyclate 100 MG capsule  · guaiFENesin 400 MG tablet           Note that more than 30 minutes was spent in preparing discharge papers, discussing discharge with patient, medication review, etc.    Signed:  Electronically signed by James Espinal DO on 8/15/2021 at 3:02 PM

## 2021-08-15 NOTE — PROGRESS NOTES
[facility] Quality Flow/Interdisciplinary Rounds Progress Note        Quality Flow Rounds held on August 15, 2021    Disciplines Attending:  Bedside Nurse, ,  and Nursing Unit Leadership    Emily Sawyer was admitted on 8/12/2021  2:56 PM    Anticipated Discharge Date:  Expected Discharge Date: 08/16/21    Disposition:    Jayme Score:  Jayme Scale Score: 22    Readmission Risk              Risk of Unplanned Readmission:  10           Discussed patient goal for the day, patient clinical progression, and barriers to discharge. The following Goal(s) of the Day/Commitment(s) have been identified: Continues to receive IV Rocephin Q 24 hours, continue to attempt to wean oxygen as tolerated, check ID plan, discharge planning.       Olinda Hodges RN  August 15, 2021

## 2021-08-15 NOTE — PROGRESS NOTES
Pulse ox was __91____% on room air at rest.  Ambulated patient on room air. Oxygen saturation was ___90___% on room air while ambulating. Oxygen was not applied. Recovery pulse ox was ___90-91___% on ____0___ liters of oxygen while ambulating.

## 2021-08-15 NOTE — PLAN OF CARE
Pt. Being discharged home accompanied by girlfriend. Instructed to take 3 printed scripts to pharmacy of choice to get filled.

## 2021-08-15 NOTE — PROGRESS NOTES
ALT 10   AST 14   BILITOT 1.2     No results for input(s): INR, APTT in the last 72 hours. No results for input(s): CKTOTAL, CKMB, TROPONINI in the last 72 hours. No results for input(s): BNP in the last 72 hours. Troponin: No results for input(s): TROPONINI in the last 72 hours. CPK:  Lab Results   Component Value Date    CKTOTAL 45 11/21/2020      BNP: No results for input(s): BNP in the last 72 hours. ABGs: No results found for: PHART, PO2ART, HMH0TBP  INR: No results for input(s): INR in the last 72 hours. Results for Amber Arias (MRN [id number]) as of 8/12/2021   Ref.  Range 8/12/2021 20:20   Influenza A by PCR Latest Ref Range: Not Detected  Not Detected   Influenza B by PCR Latest Ref Range: Not Detected  Not Detected   Adenovirus by PCR Latest Ref Range: Not Detected  Not Detected   Coronavirus 229E by PCR Latest Ref Range: Not Detected  Not Detected   Coronavirus HKU1 by PCR Latest Ref Range: Not Detected  Not Detected   Coronavirus NL63 by PCR Latest Ref Range: Not Detected  Not Detected   Coronavirus OC43 by PCR Latest Ref Range: Not Detected  Not Detected   Human Metapneumovirus by PCR Latest Ref Range: Not Detected  Not Detected   Human Rhinovirus/Enterovirus by PCR Latest Ref Range: Not Detected  Not Detected   Parainfluenza Virus 1 by PCR Latest Ref Range: Not Detected  Not Detected   Parainfluenza Virus 2 by PCR Latest Ref Range: Not Detected  Not Detected   Parainfluenza Virus 3 by PCR Latest Ref Range: Not Detected  Not Detected   Parainfluenza Virus 4 by PCR Latest Ref Range: Not Detected  Not Detected   Respiratory Syncytial Virus by PCR Latest Ref Range: Not Detected  Not Detected   Bordetella parapertussis by PCR Latest Ref Range: Not Detected  Not Detected   Chlamydophilia pneumoniae by PCR Latest Ref Range: Not Detected  Not Detected   Mycoplasma pneumoniae by PCR Latest Ref Range: Not Detected  Not Detected   SARS-CoV-2, PCR Latest Ref Range: Not Detected  Not Detected   Bordetella pertussis by PCR Latest Ref Range: Not Detected  Not Detected       21 Respiratory culture:   Group 6: <25 PMN's/LPF and <25 Epithelial cells/LPF   Few Polymorphonuclear leukocytes   Rare Epithelial cells   Rare yeast   Few Gram negative rods   Few Gram positive cocci in pairs   Few Gram positive cocci in clusters   Rare gram positive rods Diphtheroid like   -----------------------------------------------------------------  RAD:   Results for orders placed during the hospital encounter of 21    XR CHEST PORTABLE 2021: Stable left upper lobe consolidation there is no effusion or pneumothorax. Cardiomegaly. The osseous structures are without acute process. Right-sided internal jugular line tip in the SVC. Left-sided transvenous pacer device. Bilateral shoulder arthroplasties. Impression Right internal jugular line tip in the distal SVC. No pneumothorax. Stable left upper lobe opacity. Micro:  Recent Labs     21  1526   BC 24 Hours no growth     No results for input(s): ORG in the last 72 hours. Recent Labs     21  1555   BLOODCULT2 24 Hours no growth     No results for input(s): STREPPNEUMAG in the last 72 hours. No results for input(s): LEGUR in the last 72 hours. No results for input(s): ORG in the last 72 hours. No results for input(s): ASO in the last 72 hours. Recent Labs     21   CULTRESP Oral Pharyngeal Tanisha present     No results for input(s): Dale Bell in the last 72 hours.   Vent Information  Skin Assessment: Clean, dry, & intact  SpO2: 96 %    Additional Respiratory  Assessments  Pulse: 71  Resp: 18  SpO2: 96 %  Oral Care: Teeth brushed, Denture care, Mouthwash    Objective:   Vitals:   Vitals:    08/15/21 0815   BP: 136/77   Pulse: 71   Resp: 18   Temp: 97.6 °F (36.4 °C)   SpO2: 96%      TEMP:Current: Temp: 97.6 °F (36.4 °C)  Max: Temp  Av °F (36.7 °C)  Min: 97.6 °F (36.4 °C)  Max: 98.3 °F (36.8 °C)    BP Range: Systolic (97LZY), VXF:523 , Min:117 ,

## 2021-08-16 ENCOUNTER — CARE COORDINATION (OUTPATIENT)
Dept: CARE COORDINATION | Age: 81
End: 2021-08-16

## 2021-08-16 NOTE — CARE COORDINATION
contacted the patient by telephone to perform post hospital discharge assessment. Verified name and  with patient as identifiers. Provided introduction to self, and explanation of the CTN role. CTN reviewed discharge instructions, medical action plan and red flags with patient who verbalized understanding. Patient given an opportunity to ask questions and does not have any further questions or concerns at this time. Were discharge instructions available to patient? Yes. Reviewed appropriate site of care based on symptoms and resources available to patient including: PCP, Specialist and When to call 911. The patient agrees to contact the PCP office for questions related to their healthcare. Medication reconciliation was performed with patient, who verbalizes understanding of administration of home medications. Advised obtaining a 90-day supply of all daily and as-needed medications. Covid Risk Education     Educated patient about risk for severe COVID-19 due to risk factors according to CDC guidelines. LPN CC reviewed discharge instructions, medical action plan and red flag symptoms with the patient who verbalized understanding. Discussed COVID vaccination status: Yes. Education provided on COVID-19 vaccination as appropriate. Discussed exposure protocols and quarantine with CDC Guidelines. Patient was given an opportunity to verbalize any questions and concerns and agrees to contact LPN CC or health care provider for questions related to their healthcare. Reviewed and educated patient on any new and changed medications related to discharge diagnosis. Was patient discharged with a pulse oximeter? No Discussed and confirmed pulse oximeter discharge instructions and when to notify provider or seek emergency care. LPN CC provided contact information. CTN will await communication/handoff from [facility] based on severity of symptoms and risk factors.           Care Transitions 24 Hour Call Schedule Follow Up Appointment with PCP: Completed  Do you have any ongoing symptoms?: Yes  Patient-reported symptoms: Cough  Do you have a copy of your discharge instructions?: Yes  Do you have all of your prescriptions and are they filled?: Yes  Have you been contacted by a [facility] Pharmacist?: No  Have you scheduled your follow up appointment?: Yes  How are you going to get to your appointment?: Car - drive self  Were you discharged with any Home Care or Post Acute Services: No  Patient DME: Straight cane, Shower chair, Walker  Do you have support at home?: Partner/Spouse/SO  Do you feel like you have everything you need to keep you well at home?: Yes  Care Transitions Interventions  No Identified Needs         Follow Up  No future appointments.     Mode Madsen, JUAN CN

## 2021-08-17 LAB
BLOOD CULTURE, ROUTINE: NORMAL
CULTURE, BLOOD 2: NORMAL

## 2021-09-17 ENCOUNTER — CARE COORDINATION (OUTPATIENT)
Dept: CASE MANAGEMENT | Age: 81
End: 2021-09-17

## 2021-09-17 NOTE — CARE COORDINATION
[address] Follow Up Call    2021    Patient: Boby Verde  Patient : 1940   MRN: [id number]  Reason for Admission: Pneumonia  Discharge Date: 8/15/21 RARS: Readmission Risk Score: 11    [facility] Care Transitions Follow Up Call:  Spoke with patient for Follow up [address] [place] Transition Call post hospital discharge. Patient states he is doing well. Denies SOB, wheezing, cough, chest pain with inspiration or expiration, fatigue, fever or chills. Patient confirms has all medications and is taking as directed. Patient has a good appetite and is drinking adequate fluids. Normal bladder and bowel elimination patterns. [facility] Patient has no needs or concerns for writer at this time. Will continue to follow. Sourav Lira LPN    [phone]  [place] [employer] [facility] 45 Coordinator      Needs to be reviewed by the provider   Additional needs identified to be addressed with provider No  none             Method of communication with provider : none      Care Transition Nurse (CTN) contacted the patient by telephone to follow up after admission on 2021. Verified name and  with patient as identifiers. Addressed changes since last contact: none  Discussed follow-up appointments. If no appointment was previously scheduled, appointment scheduling offered: Yes   Is follow up appointment scheduled within 7 days of discharge? No    Advance Care Planning:   Does patient have an Advance Directive:  reviewed and current. CTN reviewed discharge instructions, medical action plan and red flags with patient and discussed any barriers to care and/or understanding of plan of care after discharge. Discussed appropriate site of care based on symptoms and resources available to patient including: When to call 911. The patient agrees to contact the PCP office for questions related to their healthcare.      Patients top risk factors for readmission: lack of knowledge about disease, medical condition-Pneumonia and medication management  Interventions to address risk factors: Obtained and reviewed discharge summary and/or continuity of care documents    [facility] follow up appointment(s): [facility]    CTN provided contact information for future needs. Plan for follow-up call in 10-14 days based on severity of symptoms and risk factors. Plan for next call: symptom management-Pneumonia      Care Transitions Subsequent and Final Call    Subsequent and Final Calls  Do you have any ongoing symptoms?: No  Have your medications changed?: No  Do you have any questions related to your medications?: No  Do you currently have any active services?: No  Do you have any needs or concerns that I can assist you with?: No  Identified Barriers: None  Care Transitions Interventions  Other Interventions: Follow Up  No future appointments.     Jessie Machuca LPN

## 2021-09-24 ENCOUNTER — CARE COORDINATION (OUTPATIENT)
Dept: CARE COORDINATION | Age: 81
End: 2021-09-24

## 2021-09-24 NOTE — CARE COORDINATION
[facility] 45 Transitions Follow Up Call    2021    Patient: Zora Beltrán  Patient : 1940   MRN: <[id number]>  Reason for Admission: -21 Septic Shock  Discharge Date: 8/15/21 RARS: Readmission Risk Score: 11         Spoke with: Patient, Roosevelt Hamman reports doing well. Denies CP SOB, cough, congestion or flu like symptoms. Pt has all medications. Denies any new needs or concerns. PCP f/u completed 21. Will continue to follow. Care Transitions Follow Up Call    Needs to be reviewed by the provider   Additional needs identified to be addressed with provider: No  none         Method of communication with provider : none      Care Transition Nurse (CTN) contacted the patient by telephone to follow up after admission on 21. Verified name and  with patient as identifiers. Addressed changes since last contact: none  Discussed follow-up appointments. If no appointment was previously scheduled, appointment scheduling offered: Yes. Is follow up appointment scheduled within 7 days of discharge? Yes. Advance Care Planning:   Does patient have an Advance Directive: not on file. CTN reviewed discharge instructions, medical action plan and red flags with patient and discussed any barriers to care and/or understanding of plan of care after discharge. Discussed appropriate site of care based on symptoms and resources available to patient including: PCP, Specialist and When to call 911. The patient agrees to contact the PCP office for questions related to their healthcare. Patients top risk factors for readmission: medical condition-CHF, CVD, Afib  Interventions to address risk factors: Obtained and reviewed discharge summary and/or continuity of care documents      Non-[facility] follow up appointment(s):     CTN provided contact information for future needs. Plan for follow-up call in 5-7 days based on severity of symptoms and risk factors.   Plan for next call: Changes since discharge          Care Transitions Subsequent and Final Call    Subsequent and Final Calls  Care Transitions Interventions  Other Interventions: Follow Up  No future appointments.     Terry hCaidez LPN

## 2021-10-01 ENCOUNTER — CARE COORDINATION (OUTPATIENT)
Dept: CASE MANAGEMENT | Age: 81
End: 2021-10-01

## 2021-10-01 NOTE — CARE COORDINATION
[facility] Transitions Follow Up Call    10/1/2021    Patient: Clarene Kehr  Patient : 1940   MRN: <[id number]>  Reason for Admission:  -21 Septic Shock  Discharge Date: 8/15/21 RARS: Readmission Risk Score: 11         Spoke with: Kira Malik he denies chest pain, SOB, cough, fever, chills. He is eating and drinking well, normal bowel and bladder elimination. Has no concerns at this time. Care Transitions Subsequent and Final Call    Subsequent and Final Calls  Do you have any ongoing symptoms?: No  Have your medications changed?: No  Do you have any questions related to your medications?: No  Do you currently have any active services?: No  Do you have any needs or concerns that I can assist you with?: No  Identified Barriers: Lack of Education  Care Transitions Interventions  Other Interventions: Follow Up  No future appointments.     Evin Linder LPN

## 2021-10-06 ENCOUNTER — CARE COORDINATION (OUTPATIENT)
Dept: CASE MANAGEMENT | Age: 81
End: 2021-10-06

## 2021-10-06 NOTE — CARE COORDINATION
[facility]    10/6/2021    Patient: Jasson Bermudez  Patient : 1940   MRN: [id number]  Reason for Admission: Acute respiratory failure with hypoxia / Septic Shock / Pneumonia  Discharge Date: 8/15/21 RARS: Readmission Risk Score: 11    [facility] Care Transitions Follow Up Call:  Spoke with patient for Follow up [address] [place] Transition Call post hospital discharge. Patient states he is doing well. Denies SOB, wheezing, cough, chest pain with inspiration or expiration, fatigue, fever or chills. Patient confirms all medications are available and are being taken as directed. Patient has fair appetite and is drinking adequate fluids. Normal bladder and bowel elimination patterns. Patient has no needs or concerns for writer at this time. Will continue to follow. Moncho Fenton LPN    [phone]  [place] [employer] [facility] 45 Coordinator    Needs to be reviewed by the provider   Additional needs identified to be addressed with provider No  none             Method of communication with provider : none    Care Transition Nurse (CTN) contacted the patient by telephone to follow up after admission on 2021    Verified name and  with patient as identifiers. Addressed changes since last contact: none  Discussed follow-up appointments. If no appointment was previously scheduled, appointment scheduling offered: Yes   Is follow up appointment scheduled within 7 days of discharge? Yes    Advance Care Planning:   Does patient have an Advance Directive:  reviewed and current. CTN reviewed discharge instructions, medical action plan and red flags with patient and discussed any barriers to care and/or understanding of plan of care after discharge. Discussed appropriate site of care based on symptoms and resources available to patient including: When to call 911. The patient agrees to contact the PCP office for questions related to their healthcare. Patients top risk factors for readmission: lack of knowledge about disease, medical condition-Acute respiratory failure with hypoxia / Septic Shock / Pneumonia and medication management  Interventions to address risk factors: Obtained and reviewed discharge summary and/or continuity of care documents    Non-[facility] follow up appointment(s):     CTN provided contact information for future needs. Plan for follow-up call in 7-10 days based on severity of symptoms and risk factors. Plan for next call: symptom management-Acute respiratory failure with hypoxia / Septic Shock / Pneumonia      Care Transitions Subsequent and Final Call    Subsequent and Final Calls  Do you have any ongoing symptoms?: No  Have your medications changed?: No  Do you have any questions related to your medications?: No  Do you currently have any active services?: No  Do you have any needs or concerns that I can assist you with?: No  Identified Barriers: None  Care Transitions Interventions  Other Interventions: Follow Up  No future appointments.     Jazmyn Fam LPN

## 2021-10-15 ENCOUNTER — CARE COORDINATION (OUTPATIENT)
Dept: CARE COORDINATION | Age: 81
End: 2021-10-15

## 2021-10-15 NOTE — CARE COORDINATION
[facility] 45 Transitions Follow Up Call    10/15/2021    Patient: Navneet Whitney  Patient : 1940   MRN: <[id number]>  Reason for Admission: -21 Septic Shock  Discharge Date: 8/15/21 RARS: Readmission Risk Score: 11         Spoke with: Patient    Lutsen Mood reports doing well. He denies CP, SOB, cough , congestion, fever, chills, or flu like symptoms. Denies change in medications. States his appetite is fair, drinking enough fluids. Denies needs, CTN will continue to follow. Care Transitions Follow Up Call    Needs to be reviewed by the provider   Additional needs identified to be addressed with provider: No  none       Method of communication with provider : none      Care Transition Nurse (CTN) contacted the patient by telephone to follow up after admission on 21. Verified name and  with patient as identifiers. Addressed changes since last contact: none  Discussed follow-up appointments. If no appointment was previously scheduled, appointment scheduling offered: Yes. Is follow up appointment scheduled within 7 days of discharge? Yes. Advance Care Planning:   Does patient have an Advance Directive: not on file. CTN reviewed discharge instructions, medical action plan and red flags with patient and discussed any barriers to care and/or understanding of plan of care after discharge. Discussed appropriate site of care based on symptoms and resources available to patient including: PCP, Specialist and When to call 911. The patient agrees to contact the PCP office for questions related to their healthcare. Patients top risk factors for readmission: medical condition-CHF, CVD, AFIB  Interventions to address risk factors: Obtained and reviewed discharge summary and/or continuity of care documents      Non-[facility] follow up appointment(s):     CTN provided contact information for future needs. Plan for follow-up call in 5-7 days based on severity of symptoms and risk factors.   Plan for next call: update on Pt condition        Care Transitions Subsequent and Final Call    Schedule Follow Up Appointment with PCP: Completed  Subsequent and Final Calls  Do you have any ongoing symptoms?: No  Have your medications changed?: No  Do you have any questions related to your medications?: No  Do you currently have any active services?: No  Do you have any needs or concerns that I can assist you with?: No  Identified Barriers: None  Care Transitions Interventions  No Identified Needs  Other Interventions: Follow Up  No future appointments.     Bryant Calzada LPN

## 2021-11-02 ENCOUNTER — CARE COORDINATION (OUTPATIENT)
Dept: CARE COORDINATION | Age: 81
End: 2021-11-02

## 2021-11-02 NOTE — CARE COORDINATION
[facility] Transitions Follow Up Call    2021    Patient: Velia Aviles  Patient : 1940   MRN: <[id number]>  Reason for Admission: -21 Septic Shock  Discharge Date: 8/15/21 RARS: Readmission Risk Score: 11         Spoke with: Attempted to contact patient for [facility]PCI follow up Care Transition call. No answer. No voicemail. Will attempt to reach again. Care Transitions Subsequent and Final Call    Subsequent and Final Calls  Care Transitions Interventions  Other Interventions: Follow Up  No future appointments.     Kenji Gutierrez LPN

## 2021-11-09 ENCOUNTER — CARE COORDINATION (OUTPATIENT)
Dept: CARE COORDINATION | Age: 81
End: 2021-11-09

## 2022-10-11 ENCOUNTER — APPOINTMENT (OUTPATIENT)
Dept: GENERAL RADIOLOGY | Age: 82
DRG: 291 | End: 2022-10-11
Payer: OTHER GOVERNMENT

## 2022-10-11 ENCOUNTER — HOSPITAL ENCOUNTER (INPATIENT)
Age: 82
LOS: 3 days | Discharge: HOME | DRG: 291 | End: 2022-10-14
Attending: EMERGENCY MEDICINE | Admitting: INTERNAL MEDICINE
Payer: OTHER GOVERNMENT

## 2022-10-11 DIAGNOSIS — I50.43 CHF (CONGESTIVE HEART FAILURE), NYHA CLASS I, ACUTE ON CHRONIC, COMBINED (HCC): ICD-10-CM

## 2022-10-11 DIAGNOSIS — I50.9 ACUTE ON CHRONIC CONGESTIVE HEART FAILURE, UNSPECIFIED HEART FAILURE TYPE (HCC): Primary | ICD-10-CM

## 2022-10-11 DIAGNOSIS — J96.01 ACUTE HYPOXEMIC RESPIRATORY FAILURE (HCC): ICD-10-CM

## 2022-10-11 LAB
ALBUMIN SERPL-MCNC: 4.4 G/DL (ref 3.5–5.2)
ALP BLD-CCNC: 81 U/L (ref 40–129)
ALT SERPL-CCNC: 11 U/L (ref 0–40)
ANION GAP SERPL CALCULATED.3IONS-SCNC: 7 MMOL/L (ref 7–16)
AST SERPL-CCNC: 20 U/L (ref 0–39)
BASOPHILS ABSOLUTE: 0.02 E9/L (ref 0–0.2)
BASOPHILS RELATIVE PERCENT: 0.3 % (ref 0–2)
BILIRUB SERPL-MCNC: 1.3 MG/DL (ref 0–1.2)
BILIRUBIN DIRECT: 0.4 MG/DL (ref 0–0.3)
BILIRUBIN, INDIRECT: 0.9 MG/DL (ref 0–1)
BUN BLDV-MCNC: 10 MG/DL (ref 6–23)
CALCIUM SERPL-MCNC: 9.9 MG/DL (ref 8.6–10.2)
CHLORIDE BLD-SCNC: 100 MMOL/L (ref 98–107)
CO2: 34 MMOL/L (ref 22–29)
CREAT SERPL-MCNC: 1.1 MG/DL (ref 0.7–1.2)
EKG ATRIAL RATE: 69 BPM
EKG Q-T INTERVAL: 468 MS
EKG QRS DURATION: 200 MS
EKG QTC CALCULATION (BAZETT): 522 MS
EKG R AXIS: -82 DEGREES
EKG T AXIS: 90 DEGREES
EKG VENTRICULAR RATE: 75 BPM
EOSINOPHILS ABSOLUTE: 0.13 E9/L (ref 0.05–0.5)
EOSINOPHILS RELATIVE PERCENT: 1.9 % (ref 0–6)
GFR AFRICAN AMERICAN: >60
GFR NON-AFRICAN AMERICAN: >60 ML/MIN/1.73
GLUCOSE BLD-MCNC: 119 MG/DL (ref 74–99)
HCT VFR BLD CALC: 43.3 % (ref 37–54)
HEMOGLOBIN: 14.5 G/DL (ref 12.5–16.5)
IMMATURE GRANULOCYTES #: 0.02 E9/L
IMMATURE GRANULOCYTES %: 0.3 % (ref 0–5)
LYMPHOCYTES ABSOLUTE: 0.5 E9/L (ref 1.5–4)
LYMPHOCYTES RELATIVE PERCENT: 7.2 % (ref 20–42)
MCH RBC QN AUTO: 33.3 PG (ref 26–35)
MCHC RBC AUTO-ENTMCNC: 33.5 % (ref 32–34.5)
MCV RBC AUTO: 99.3 FL (ref 80–99.9)
MONOCYTES ABSOLUTE: 0.54 E9/L (ref 0.1–0.95)
MONOCYTES RELATIVE PERCENT: 7.8 % (ref 2–12)
NEUTROPHILS ABSOLUTE: 5.7 E9/L (ref 1.8–7.3)
NEUTROPHILS RELATIVE PERCENT: 82.5 % (ref 43–80)
PDW BLD-RTO: 13.6 FL (ref 11.5–15)
PLATELET # BLD: 174 E9/L (ref 130–450)
PMV BLD AUTO: 10.9 FL (ref 7–12)
POTASSIUM REFLEX MAGNESIUM: 3.9 MMOL/L (ref 3.5–5)
PRO-BNP: 2404 PG/ML (ref 0–450)
RBC # BLD: 4.36 E12/L (ref 3.8–5.8)
RBC # BLD: NORMAL 10*6/UL
SODIUM BLD-SCNC: 141 MMOL/L (ref 132–146)
TOTAL PROTEIN: 7 G/DL (ref 6.4–8.3)
TROPONIN, HIGH SENSITIVITY: 23 NG/L (ref 0–11)
TROPONIN, HIGH SENSITIVITY: 24 NG/L (ref 0–11)
WBC # BLD: 6.9 E9/L (ref 4.5–11.5)

## 2022-10-11 PROCEDURE — 2500000003 HC RX 250 WO HCPCS: Performed by: STUDENT IN AN ORGANIZED HEALTH CARE EDUCATION/TRAINING PROGRAM

## 2022-10-11 PROCEDURE — 80076 HEPATIC FUNCTION PANEL: CPT

## 2022-10-11 PROCEDURE — 84484 ASSAY OF TROPONIN QUANT: CPT

## 2022-10-11 PROCEDURE — 2700000000 HC OXYGEN THERAPY PER DAY

## 2022-10-11 PROCEDURE — 93005 ELECTROCARDIOGRAM TRACING: CPT | Performed by: STUDENT IN AN ORGANIZED HEALTH CARE EDUCATION/TRAINING PROGRAM

## 2022-10-11 PROCEDURE — 80048 BASIC METABOLIC PNL TOTAL CA: CPT

## 2022-10-11 PROCEDURE — 83880 ASSAY OF NATRIURETIC PEPTIDE: CPT

## 2022-10-11 PROCEDURE — 6370000000 HC RX 637 (ALT 250 FOR IP): Performed by: STUDENT IN AN ORGANIZED HEALTH CARE EDUCATION/TRAINING PROGRAM

## 2022-10-11 PROCEDURE — 99223 1ST HOSP IP/OBS HIGH 75: CPT | Performed by: INTERNAL MEDICINE

## 2022-10-11 PROCEDURE — 2500000003 HC RX 250 WO HCPCS: Performed by: NURSE PRACTITIONER

## 2022-10-11 PROCEDURE — 85025 COMPLETE CBC W/AUTO DIFF WBC: CPT

## 2022-10-11 PROCEDURE — 99222 1ST HOSP IP/OBS MODERATE 55: CPT | Performed by: NURSE PRACTITIONER

## 2022-10-11 PROCEDURE — 36415 COLL VENOUS BLD VENIPUNCTURE: CPT

## 2022-10-11 PROCEDURE — 99285 EMERGENCY DEPT VISIT HI MDM: CPT

## 2022-10-11 PROCEDURE — 2580000003 HC RX 258: Performed by: NURSE PRACTITIONER

## 2022-10-11 PROCEDURE — 2060000000 HC ICU INTERMEDIATE R&B

## 2022-10-11 PROCEDURE — 71045 X-RAY EXAM CHEST 1 VIEW: CPT

## 2022-10-11 RX ORDER — SODIUM CHLORIDE 0.9 % (FLUSH) 0.9 %
5-40 SYRINGE (ML) INJECTION EVERY 12 HOURS SCHEDULED
Status: DISCONTINUED | OUTPATIENT
Start: 2022-10-11 | End: 2022-10-14 | Stop reason: HOSPADM

## 2022-10-11 RX ORDER — ONDANSETRON 2 MG/ML
4 INJECTION INTRAMUSCULAR; INTRAVENOUS EVERY 6 HOURS PRN
Status: DISCONTINUED | OUTPATIENT
Start: 2022-10-11 | End: 2022-10-12

## 2022-10-11 RX ORDER — POLYETHYLENE GLYCOL 3350 17 G/17G
17 POWDER, FOR SOLUTION ORAL DAILY PRN
Status: DISCONTINUED | OUTPATIENT
Start: 2022-10-11 | End: 2022-10-14 | Stop reason: HOSPADM

## 2022-10-11 RX ORDER — LISINOPRIL 20 MG/1
20 TABLET ORAL DAILY
Status: DISCONTINUED | OUTPATIENT
Start: 2022-10-12 | End: 2022-10-12

## 2022-10-11 RX ORDER — ACETAMINOPHEN 650 MG/1
650 SUPPOSITORY RECTAL EVERY 6 HOURS PRN
Status: DISCONTINUED | OUTPATIENT
Start: 2022-10-11 | End: 2022-10-14 | Stop reason: HOSPADM

## 2022-10-11 RX ORDER — SODIUM CHLORIDE 0.9 % (FLUSH) 0.9 %
5-40 SYRINGE (ML) INJECTION PRN
Status: DISCONTINUED | OUTPATIENT
Start: 2022-10-11 | End: 2022-10-14 | Stop reason: HOSPADM

## 2022-10-11 RX ORDER — SODIUM CHLORIDE 9 MG/ML
INJECTION, SOLUTION INTRAVENOUS PRN
Status: DISCONTINUED | OUTPATIENT
Start: 2022-10-11 | End: 2022-10-14 | Stop reason: HOSPADM

## 2022-10-11 RX ORDER — METOPROLOL SUCCINATE 100 MG/1
100 TABLET, EXTENDED RELEASE ORAL DAILY
Status: DISCONTINUED | OUTPATIENT
Start: 2022-10-12 | End: 2022-10-14 | Stop reason: HOSPADM

## 2022-10-11 RX ORDER — ONDANSETRON 4 MG/1
4 TABLET, ORALLY DISINTEGRATING ORAL EVERY 8 HOURS PRN
Status: DISCONTINUED | OUTPATIENT
Start: 2022-10-11 | End: 2022-10-12

## 2022-10-11 RX ORDER — BUMETANIDE 0.25 MG/ML
1 INJECTION, SOLUTION INTRAMUSCULAR; INTRAVENOUS 2 TIMES DAILY
Status: DISCONTINUED | OUTPATIENT
Start: 2022-10-11 | End: 2022-10-14

## 2022-10-11 RX ORDER — ACETAMINOPHEN 325 MG/1
650 TABLET ORAL EVERY 6 HOURS PRN
Status: DISCONTINUED | OUTPATIENT
Start: 2022-10-11 | End: 2022-10-14 | Stop reason: HOSPADM

## 2022-10-11 RX ORDER — SODIUM CHLORIDE 0.9 % (FLUSH) 0.9 %
SYRINGE (ML) INJECTION
Status: DISPENSED
Start: 2022-10-11 | End: 2022-10-12

## 2022-10-11 RX ORDER — BUMETANIDE 0.25 MG/ML
2 INJECTION, SOLUTION INTRAMUSCULAR; INTRAVENOUS ONCE
Status: COMPLETED | OUTPATIENT
Start: 2022-10-11 | End: 2022-10-11

## 2022-10-11 RX ADMIN — BUMETANIDE 2 MG: 0.25 INJECTION INTRAMUSCULAR; INTRAVENOUS at 15:12

## 2022-10-11 RX ADMIN — BUMETANIDE 1 MG: 0.25 INJECTION, SOLUTION INTRAMUSCULAR; INTRAVENOUS at 16:48

## 2022-10-11 RX ADMIN — Medication 10 ML: at 20:11

## 2022-10-11 RX ADMIN — SODIUM CHLORIDE, PRESERVATIVE FREE 10 ML: 5 INJECTION INTRAVENOUS at 16:47

## 2022-10-11 RX ADMIN — APIXABAN 5 MG: 5 TABLET, FILM COATED ORAL at 20:11

## 2022-10-11 ASSESSMENT — ENCOUNTER SYMPTOMS
SHORTNESS OF BREATH: 1
ABDOMINAL PAIN: 0
DIARRHEA: 0
NAUSEA: 0
EYE DISCHARGE: 0
VOMITING: 0
SORE THROAT: 0
BACK PAIN: 0
WHEEZING: 0
SINUS PRESSURE: 0
EYE PAIN: 0
COUGH: 0

## 2022-10-11 NOTE — PROGRESS NOTES
Admission database completed to best of this RN's ability. Pharmacy tech to review medication list. Care plan and education initiated. Pt independent from home with friend. Drove self to hospital. Uses a cane with ambulation PRN. 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 [place]; wears bilateral hearing aids. Denies any [place] [address] services prior to admission[facility]

## 2022-10-11 NOTE — PROGRESS NOTES
Patient requests to receive his morning medications @ 0530, which is the time he takes at home. Requests sent to pharmacy to adjust times.     Electronically signed by Maria Alejandra Enamorado RN on 10/11/2022 at 7:35 PM

## 2022-10-11 NOTE — PROGRESS NOTES
New consult called to Dr Mary Ellen Kang answering service. Returned call from Dr Tyree Bassett, no new orders at this time.  Electronically signed by Sandra Chance RN on 10/11/2022 at 5:18 PM

## 2022-10-11 NOTE — H&P
[facility] Addendum    I have participated in the history, exam, medical decision making with Adriana Granger NP on the date of service. I have also reviewed available labs, imaging / microbiologic / cardiac studies as well as the input of any consultants involved in this case. 80 y.o. male w PMH Afib on Eliquis, HTN, GINA, and obesity who presents to ED today from home with complaints of SOB, chest pain, and leg swelling. Began ~1 week ago and worsening. Pt sleeping in chair now as he cannot lie flat. He does note he cannot comment on a baseline weight as it varies. He actually also reported not being on a home diuretic. Home med list being verified. Initial evaluation in the ED showed pt was satting 80s on room air but otherwise hemodynamically stable. Workup showed BNP 2400 and slight hyperbilirubinemia but really was otherwise fairly unremarkable. Imaging studies included CXR, which showed cardiomegaly. EKG nonischemic. Pt was given Bumex in the ED and admitted for further evaluation and treatment with consult requests to cardiology. Objective  Physical Exam  Vitals: /77   Pulse 82   Temp 97.9 °F (36.6 °C) (Oral)   Resp 18   SpO2 99%   General: well-developed, well-nourished, no acute distress, cooperative  Skin: generally warm, dry, and intact, with normal color  HEENT: normocephalic, atraumatic, no gross abnormalities  Respiratory: clear to auscultation bilaterally without respiratory distress  Cardiovascular: regular rate and rhythm without murmur / rub / gallop  Abdominal: soft, nontender, nondistended, normoactive bowel sounds  Extremities: no obvious edema or deformity  Neurologic: awake, alert, no gross deficits  Psychiatric: normal affect, cooperative    Echo results 9/4/2019  Moderately severe left ventricular concentric hypertrophy noted. Ejection fraction is visually estimated at 35 +/- 5%. Septal motion consistent with paced rhythm .   Indeterminate diastolic function. The left atrium is moderately dilated. Normal right ventricular size and function. Right ventricle pacemaker lead noted. Moderate mitral regurgitation is present. Mild tricuspid regurgitation. RVSP is 33 mmHg. Assessment  Acute hypoxia due to acute-on-chronic HFrEF  Mild hyperbilirubinemia noted incidentally  Hx Afib on Eliquis, HTN, GINA, and obesity    Plan  O2 as needed, wean as able, on 4L in ED  Bumex 1 mg IV bid with close eye on volume status, potassium, and renal function  Update echo as last one was Sept 2019 w EF around 35%; may now qualify for Life Vest  Continue ARB, Aldactone  Consider use of SGLT2 inhibitor and Entresto - consult to [facility] cardiology for input  Recheck CMP in AM    Otherwise as per NP's note. Please see orders for further plan of care. Time spent on chart review, clinical exam, discussing case and answering questions with staff, consultants, patient, and family was approximately 25 minutes.     Electronically signed by Brittany Lockett DO on 10/11/2022 at 2:57 PM

## 2022-10-11 NOTE — PROGRESS NOTES
Please NOTE : the patient's QTc is 522 . The use of Zofran may further prolong the QT interval. Please DC the Zofran at this time. If an antiemetic is needed, Tigan 200 mg IM q6h prn  is a safer alternative. Thank you.

## 2022-10-11 NOTE — H&P
[address] [facility]   History and Physical    CHIEF COMPLAINT: Chest pain, shortness of breath, & leg swelling    History of Present Illness:  Baldemar Anderson is a 80 y.o. male with a history of A-fib, HTN, GINA, obesity & HFrEF who presents with Chest Pain (Symptoms x 2 weeks and worsening ), Shortness of Breath, and Leg Swelling  Patient states he has had SOB, chest pressure, & leg swelling for the last week. States it has progressed to the point that he came in today for further evaluation. Patient states he has been unable to lie in bed for the last week due to orthopnea, he has been sleeping in a recliner. Chest pain described as pressure located in left chest, does not radiate. States it is worse when he lies flat rated 8/10. Productive cough with clear sputum. Patient reports 8 pound weight loss in the last 3 days. States weight fluctuates daily; unable to verbalize avg daily wt changes because it 'all over the board'. States he uses the same scale, and weighs himself at the same time every morning. Patient feels confident in his scale is accurate. Denies daily diuretic. Denies home O2. States he tried using a peak BiPAP 15 years ago, but throw it out-could not tolerate. Patient states he usually sleeps 1-2 hrs at a time, 'falls asleep mid-sentence'. Pertinent ER findings include elevated BNP (2/4/2004), elevated troponin (23, reduced from 8/2021). CXR identifies mild cardiomegaly without acute infiltrate or effusion. While in ER, pt received Bumex IV. Patient states he is starting to breathe a little better now.     WORK UP SINCE ARRIVAL:  Results for orders placed or performed during the hospital encounter of 10/11/22   CBC with Auto Differential   Result Value Ref Range    WBC 6.9 4.5 - 11.5 E9/L    RBC 4.36 3.80 - 5.80 E12/L    Hemoglobin 14.5 12.5 - 16.5 g/dL    Hematocrit 43.3 37.0 - 54.0 %    MCV 99.3 80.0 - 99.9 fL    MCH 33.3 26.0 - 35.0 pg    MCHC 33.5 32.0 - 34.5 %    RDW 13.6 11.5 - 15.0 fL    Platelets 831 972 - 140 E9/L    MPV 10.9 7.0 - 12.0 fL    Neutrophils % 82.5 (H) 43.0 - 80.0 %    Immature Granulocytes % 0.3 0.0 - 5.0 %    Lymphocytes % 7.2 (L) 20.0 - 42.0 %    Monocytes % 7.8 2.0 - 12.0 %    Eosinophils % 1.9 0.0 - 6.0 %    Basophils % 0.3 0.0 - 2.0 %    Neutrophils Absolute 5.70 1.80 - 7.30 E9/L    Immature Granulocytes # 0.02 E9/L    Lymphocytes Absolute 0.50 (L) 1.50 - 4.00 E9/L    Monocytes Absolute 0.54 0.10 - 0.95 E9/L    Eosinophils Absolute 0.13 0.05 - 0.50 E9/L    Basophils Absolute 0.02 0.00 - 0.20 E9/L    RBC Morphology Normal    Basic Metabolic Panel w/ Reflex to MG   Result Value Ref Range    Sodium 141 132 - 146 mmol/L    Potassium reflex Magnesium 3.9 3.5 - 5.0 mmol/L    Chloride 100 98 - 107 mmol/L    CO2 34 (H) 22 - 29 mmol/L    Anion Gap 7 7 - 16 mmol/L    Glucose 119 (H) 74 - 99 mg/dL    BUN 10 6 - 23 mg/dL    Creatinine 1.1 0.7 - 1.2 mg/dL    GFR Non-African American >60 >=60 mL/min/1.73    GFR African American >60     Calcium 9.9 8.6 - 10.2 mg/dL   Hepatic Function Panel   Result Value Ref Range    Total Protein 7.0 6.4 - 8.3 g/dL    Albumin 4.4 3.5 - 5.2 g/dL    Alkaline Phosphatase 81 40 - 129 U/L    ALT 11 0 - 40 U/L    AST 20 0 - 39 U/L    Total Bilirubin 1.3 (H) 0.0 - 1.2 mg/dL    Bilirubin, Direct 0.4 (H) 0.0 - 0.3 mg/dL    Bilirubin, Indirect 0.9 0.0 - 1.0 mg/dL   Troponin   Result Value Ref Range    Troponin, High Sensitivity 23 (H) 0 - 11 ng/L   Brain Natriuretic Peptide   Result Value Ref Range    Pro-BNP 2,404 (H) 0 - 450 pg/mL   EKG 12 Lead   Result Value Ref Range    Ventricular Rate 75 BPM    Atrial Rate 69 BPM    QRS Duration 200 ms    Q-T Interval 468 ms    QTc Calculation (Bazett) 522 ms    R Axis -82 degrees    T Axis 90 degrees     XR CHEST PORTABLE   Final Result by Jamison Falcon III, DO (10/11 9978)   Mild cardiomegaly with no acute infiltrate or effusion.              REVIEW OF SYSTEMS:  no fevers, chills, n/v, ha, vision/hearing changes, wt changes, hot/cold flashes, other open skin lesions, diarrhea, constipation, dysuria/hematuria unless noted in HPI. Complete ROS performed with the patient and is otherwise negative. ALLERGIES:  Coumadin [warfarin sodium] and Heparin    PAST MEDICAL & SURGICAL HISTORY:  Pt  has a past medical history of Apnea, sleep, Atrial fibrillation ([address] Hypertension, Obesity, and Pacemaker. .   Pt  has a past surgical history that includes ablation of dysrhythmic focus; shoulder surgery; and Finger amputation. Social History:  Pt  reports that he has quit smoking. He has never used smokeless tobacco. He reports current alcohol use of about 14.0 standard drinks per week. .   Pt lives with friend in a/an single-family home    Family History:  Most family on his father side have had heart attacks and/or  of heart attack. Denies any other significant MMH. Informant(s) for H&P: Patient, chart review    Pt  oral temperature is 97.9 °F (36.6 °C). His blood pressure is 132/77 and his pulse is 82. His respiration is 18 and oxygen saturation is 99%. .   There is no height or weight on file to calculate BMI. Pt's home meds include acetaminophen, apixaban, bumetanide, cetirizine, guaiFENesin, ibuprofen, lisinopril, metoprolol succinate, spironolactone, valsartan, vitamin B-12, vitamin C, and zinc sulfate    PHYSICAL EXAM:  General Appearance: Awake, alert and oriented. In no acute distress  Skin: Warm and dry, no rash or erythema  Head: normocephalic and atraumatic  Eyes: pupils equal, round, and reactive to light, extraocular eye movements intact, conjunctivae normal  ENT: external ear and ear canal normal bilaterally, nose without deformity  Neck: supple and non-tender without mass, no cervical lymphadenopathy  Pulmonary/Chest: Bilateral rales. Diminished air movement. No respiratory distress. Currently on O2 for L NC. Cardiovascular: normal rate, regular rhythm. Normal S1 and S2.  No murmurs, rubs, clicks, or gallops. Pacer left chest.  Abdomen: soft, round, non-tender, non-distended. Normal bowel sounds. No masses or organomegaly  Extremities: no cyanosis, clubbing. Bilateral LEs with 3+ pitting edema  Musculoskeletal: normal range of motion, no joint swelling, deformity or tenderness  Neurologic: reflexes normal and symmetric, no cranial nerve deficit, gait, coordination and speech normal    /77   Pulse 82   Temp 97.9 °F (36.6 °C) (Oral)   Resp 18   SpO2 99%     Recent Labs     10/11/22  1400      K 3.9      CO2 34*   BUN 10   CREATININE 1.1   GLUCOSE 119*   CALCIUM 9.9       Recent Labs     10/11/22  1400   WBC 6.9   RBC 4.36   HGB 14.5   HCT 43.3   MCV 99.3   MCH 33.3   MCHC 33.5   RDW 13.6      MPV 10.9       No results for input(s): POCGLU in the last 72 hours. Radiology: XR CHEST PORTABLE    Result Date: 10/11/2022  Mild cardiomegaly with no acute infiltrate or effusion. EKG preliminary Narrative & Impression    Ventricular-paced rhythm  Abnormal ECG  When compared with ECG of 12-AUG-2021 15:10,  No significant change was found       Assessment:  Acute on chronic HFrEF: 2019 ECHO-EF 30-40%. Moderately severe LV concentric hypertrophy. Indeterminate diastolic dysfunction. Initial BNP 2,404. Acute hypoxia secondary to above  HTN  CAD  GINA: Patient tried BiPAP 15 years ago, could not tolerate. Does not use, has not tried since that time. Plan:  Received Bumex IV in ER. Cardiology consulted. Continue Bumex IV BID.  ECHO. Strict I&O. Daily weights. Low-sodium diet. Dietitian consulted. CHF nurse consulted. Continuous telemetry. Monitor electrolytes and kidney function daily. Currently on O2 4L NC. Oxygen therapy protocol. Wean O2 as tolerated. Monitor BP. Home meds pending reconciliation  Lipid panel in a.m. Patient does not appear to take statins at home.   Patient would benefit from an outpatient sleep study    Home medications pending reconciliation    Code Status: Full code  DVT prophylaxis: SCDs. Patient reports severe reaction to anticoagulants. Home Eliquis not yet verified. 45 minutes or more spent reviewing patient chart, assessing patient, discussing plan of care with patient and family, discussing plan of care with collaborating physician, and documentation. NOTE: This report was transcribed using voice recognition software. Every effort was made to ensure accuracy; however, inadvertent computerized transcription errors may be present.      Electronically signed by MANUEL West NP on 10/11/2022 at 2:56 PM

## 2022-10-11 NOTE — ED PROVIDER NOTES
60-year-old male presenting to the emergency department for chest pain, on and off for last couple weeks, improved after receiving oxygen, associated with leg swelling, a 10 pound weight gain in last 3 days, moderate in severity, persistent, worsened by time. States he follows with Dr. Gregoria Umana for cardiology, has a history of heart failure problems. Review of Systems   Constitutional:  Negative for chills and fever. HENT:  Negative for ear pain, sinus pressure and sore throat. Eyes:  Negative for pain and discharge. Respiratory:  Positive for shortness of breath. Negative for cough and wheezing. Cardiovascular:  Positive for chest pain (States resolved after receiving oxygen) and leg swelling. Gastrointestinal:  Negative for abdominal pain, diarrhea, nausea and vomiting. Genitourinary:  Negative for dysuria and frequency. Musculoskeletal:  Negative for arthralgias and back pain. Skin:  Negative for rash and wound. Neurological:  Negative for weakness and headaches. Hematological:  Negative for adenopathy. All other systems reviewed and are negative. Physical Exam  Vitals and nursing note reviewed. Constitutional:       Appearance: He is well-developed. HENT:      Head: Normocephalic and atraumatic. Eyes:      Conjunctiva/sclera: Conjunctivae normal.   Cardiovascular:      Rate and Rhythm: Normal rate and regular rhythm. Heart sounds: Normal heart sounds. No murmur heard. Pulmonary:      Effort: Pulmonary effort is normal. No respiratory distress. Breath sounds: Rales (Bilateral) present. No wheezing. Abdominal:      General: Bowel sounds are normal.      Palpations: Abdomen is soft. Tenderness: There is no abdominal tenderness. There is no guarding or rebound. Musculoskeletal:         General: No tenderness or deformity. Cervical back: Normal range of motion and neck supple. Right lower leg: Edema (2+ pitting edema bilaterally) present. Left lower leg: Edema present. Skin:     General: Skin is warm and dry. Neurological:      Mental Status: He is alert and oriented to person, place, and time. Cranial Nerves: No cranial nerve deficit. Coordination: Coordination normal.        Procedures     MDM     Amount and/or Complexity of Data Reviewed  Clinical lab tests: reviewed  Tests in the radiology section of CPT®: reviewed  Tests in the medicine section of CPT®: reviewed         ED Course as of 10/11/22 1454   e Oct 11, 2022   1418 EKG shows ventricular paced rhythm at 75 beats minute no signs of any ST elevation. Poor quality of EKG with wandering baseline. QTc 522. No significant change from prior EKG. EKG interpreted by myself. [KK]   1422 EKG: This EKG is signed by emergency department physician. Rate: 75  Rhythm: Ventricular paced rhythm  Interpretation: non-specific EKG  Comparison: stable as compared to patient's most recent EKG      [JG]   1451 Initial troponin 23, will check a delta, give Bumex as his potassium is normal, and admit. [JG]   1453 Dr. Ely Proffer accept patient for admission. [JG]   65 45-year-old male presenting to the emergency department for shortness of breath. Follows with [place] with cardiology for history of heart failure. Patient has had increasing weight gain, orthopnea, dyspnea on exertion. Was hypoxic upon arrival, does not typically require oxygen. Had rales on exam, clinically appear to have CHF. Was given a dose of Bumex, admitted to hospital for further work-up and management of acute hypoxemic respiratory failure, and CHF exacerbation. [JG]      ED Course User Index  [JG] MD Heydi Ace MD      45-year-old male presenting to the emergency department for shortness of breath. Follows with [place] with cardiology for history of heart failure. Patient has had increasing weight gain, orthopnea, dyspnea on exertion.   Was hypoxic upon arrival, does not typically require oxygen. Had rales on exam, clinically appear to have CHF. Was given a dose of Bumex, admitted to hospital for further work-up and management of acute hypoxemic respiratory failure, and CHF exacerbation. ED Course as of 10/11/22 1454   Tue Oct 11, 2022   1418 EKG shows ventricular paced rhythm at 75 beats minute no signs of any ST elevation. Poor quality of EKG with wandering baseline. QTc 522. No significant change from prior EKG. EKG interpreted by myself. [KK]   1422 EKG: This EKG is signed by emergency department physician. Rate: 75  Rhythm: Ventricular paced rhythm  Interpretation: non-specific EKG  Comparison: stable as compared to patient's most recent EKG      [JG]   1451 Initial troponin 23, will check a delta, give Bumex as his potassium is normal, and admit. [JG]   1453 Dr. Radha Fisher accept patient for admission. [JG]   65 51-year-old male presenting to the emergency department for shortness of breath. Follows with Jagdish Jenkins with cardiology for history of heart failure. Patient has had increasing weight gain, orthopnea, dyspnea on exertion. Was hypoxic upon arrival, does not typically require oxygen. Had rales on exam, clinically appear to have CHF. Was given a dose of Bumex, admitted to hospital for further work-up and management of acute hypoxemic respiratory failure, and CHF exacerbation. [JG]      ED Course User Index  [JG] MD Daryl Thomas MD       --------------------------------------------- PAST HISTORY ---------------------------------------------  Past Medical History:  has a past medical history of Apnea, sleep, Atrial fibrillation ([address] Hypertension, Obesity, and Pacemaker. Past Surgical History:  has a past surgical history that includes ablation of dysrhythmic focus; shoulder surgery; and Finger amputation. Social History:  reports that he has quit smoking.  He has never used smokeless tobacco. He reports current alcohol use of about 14.0 standard drinks per week. Family History: family history is not on file. The patients home medications have been reviewed.     Allergies: Coumadin [warfarin sodium] and Heparin    -------------------------------------------------- RESULTS -------------------------------------------------    LABS:  Results for orders placed or performed during the hospital encounter of 10/11/22   CBC with Auto Differential   Result Value Ref Range    WBC 6.9 4.5 - 11.5 E9/L    RBC 4.36 3.80 - 5.80 E12/L    Hemoglobin 14.5 12.5 - 16.5 g/dL    Hematocrit 43.3 37.0 - 54.0 %    MCV 99.3 80.0 - 99.9 fL    MCH 33.3 26.0 - 35.0 pg    MCHC 33.5 32.0 - 34.5 %    RDW 13.6 11.5 - 15.0 fL    Platelets 425 391 - 795 E9/L    MPV 10.9 7.0 - 12.0 fL    Neutrophils % 82.5 (H) 43.0 - 80.0 %    Immature Granulocytes % 0.3 0.0 - 5.0 %    Lymphocytes % 7.2 (L) 20.0 - 42.0 %    Monocytes % 7.8 2.0 - 12.0 %    Eosinophils % 1.9 0.0 - 6.0 %    Basophils % 0.3 0.0 - 2.0 %    Neutrophils Absolute 5.70 1.80 - 7.30 E9/L    Immature Granulocytes # 0.02 E9/L    Lymphocytes Absolute 0.50 (L) 1.50 - 4.00 E9/L    Monocytes Absolute 0.54 0.10 - 0.95 E9/L    Eosinophils Absolute 0.13 0.05 - 0.50 E9/L    Basophils Absolute 0.02 0.00 - 0.20 E9/L    RBC Morphology Normal    Basic Metabolic Panel w/ Reflex to MG   Result Value Ref Range    Sodium 141 132 - 146 mmol/L    Potassium reflex Magnesium 3.9 3.5 - 5.0 mmol/L    Chloride 100 98 - 107 mmol/L    CO2 34 (H) 22 - 29 mmol/L    Anion Gap 7 7 - 16 mmol/L    Glucose 119 (H) 74 - 99 mg/dL    BUN 10 6 - 23 mg/dL    Creatinine 1.1 0.7 - 1.2 mg/dL    GFR Non-African American >60 >=60 mL/min/1.73    GFR African American >60     Calcium 9.9 8.6 - 10.2 mg/dL   Hepatic Function Panel   Result Value Ref Range    Total Protein 7.0 6.4 - 8.3 g/dL    Albumin 4.4 3.5 - 5.2 g/dL    Alkaline Phosphatase 81 40 - 129 U/L    ALT 11 0 - 40 U/L    AST 20 0 - 39 U/L    Total Bilirubin 1.3 (H) 0.0 - 1.2 mg/dL    Bilirubin, Direct 0.4 (H) 0.0 - 0.3 mg/dL    Bilirubin, Indirect 0.9 0.0 - 1.0 mg/dL   Troponin   Result Value Ref Range    Troponin, High Sensitivity 23 (H) 0 - 11 ng/L   Brain Natriuretic Peptide   Result Value Ref Range    Pro-BNP 2,404 (H) 0 - 450 pg/mL   EKG 12 Lead   Result Value Ref Range    Ventricular Rate 75 BPM    Atrial Rate 69 BPM    QRS Duration 200 ms    Q-T Interval 468 ms    QTc Calculation (Bazett) 522 ms    R Axis -82 degrees    T Axis 90 degrees       RADIOLOGY:  XR CHEST PORTABLE   Final Result   Mild cardiomegaly with no acute infiltrate or effusion.                 ------------------------- NURSING NOTES AND VITALS REVIEWED ---------------------------  Date / Time Roomed:  10/11/2022  1:51 PM  ED Bed Assignment:  12/12    The nursing notes within the ED encounter and vital signs as below have been reviewed. Patient Vitals for the past 24 hrs:   BP Temp Temp src Pulse Resp SpO2   10/11/22 1312 132/77 97.9 °F (36.6 °C) Oral 82 18 99 %   10/11/22 1311 -- -- -- -- -- (!) 88 %       Oxygen Saturation Interpretation: Abnormal and Improved after treatment    ------------------------------------------ PROGRESS NOTES ------------------------------------------      Counseling:  I have spoken with the patient and discussed todays results, in addition to providing specific details for the plan of care and counseling regarding the diagnosis and prognosis. Their questions are answered at this time and they are agreeable with the plan of admission.    --------------------------------- ADDITIONAL PROVIDER NOTES ---------------------------------  Consultations:  Time: 1455. Spoke with Dr. Maria Luz Hope. Discussed case. They will admit the patient.   This patient's ED course included: a personal history and physicial examination, re-evaluation prior to disposition, multiple bedside re-evaluations, IV medications, cardiac monitoring, and continuous pulse oximetry    This patient has remained hemodynamically stable during their ED course. Diagnosis:  1. Acute on chronic congestive heart failure, unspecified heart failure type ([facility] [address]    2. Acute hypoxemic respiratory failure (HCC)        Disposition:  Patient's disposition: Admit to telemetry  Patient's condition is stable.            Dulce Okeefe MD  Resident  10/11/22 9578

## 2022-10-11 NOTE — PROGRESS NOTES
Called house team to update that home medications not currently ordered.     Electronically signed by Taylor Theodore RN on 10/11/2022 at 6:57 PM

## 2022-10-12 PROBLEM — E83.42 HYPOMAGNESEMIA: Status: ACTIVE | Noted: 2022-10-12

## 2022-10-12 PROBLEM — E87.6 HYPOKALEMIA: Status: ACTIVE | Noted: 2022-10-12

## 2022-10-12 PROBLEM — I50.41 ACUTE COMBINED SYSTOLIC AND DIASTOLIC HEART FAILURE (HCC): Status: ACTIVE | Noted: 2022-10-12

## 2022-10-12 PROBLEM — J96.01 ACUTE RESPIRATORY FAILURE WITH HYPOXIA (HCC): Status: ACTIVE | Noted: 2022-10-12

## 2022-10-12 LAB
ANION GAP SERPL CALCULATED.3IONS-SCNC: 9 MMOL/L (ref 7–16)
BUN BLDV-MCNC: 10 MG/DL (ref 6–23)
CALCIUM SERPL-MCNC: 9.3 MG/DL (ref 8.6–10.2)
CHLORIDE BLD-SCNC: 98 MMOL/L (ref 98–107)
CHOLESTEROL, TOTAL: 119 MG/DL (ref 0–199)
CO2: 33 MMOL/L (ref 22–29)
CREAT SERPL-MCNC: 1.2 MG/DL (ref 0.7–1.2)
GFR AFRICAN AMERICAN: >60
GFR NON-AFRICAN AMERICAN: 58 ML/MIN/1.73
GLUCOSE BLD-MCNC: 101 MG/DL (ref 74–99)
HDLC SERPL-MCNC: 66 MG/DL
LDL CHOLESTEROL CALCULATED: 42 MG/DL (ref 0–99)
LV EF: 40 %
LVEF MODALITY: NORMAL
MAGNESIUM: 1.2 MG/DL (ref 1.6–2.6)
PHOSPHORUS: 2.5 MG/DL (ref 2.5–4.5)
POTASSIUM SERPL-SCNC: 3.1 MMOL/L (ref 3.5–5)
SODIUM BLD-SCNC: 140 MMOL/L (ref 132–146)
TRIGL SERPL-MCNC: 55 MG/DL (ref 0–149)
VLDLC SERPL CALC-MCNC: 11 MG/DL

## 2022-10-12 PROCEDURE — 80048 BASIC METABOLIC PNL TOTAL CA: CPT

## 2022-10-12 PROCEDURE — 2060000000 HC ICU INTERMEDIATE R&B

## 2022-10-12 PROCEDURE — 2700000000 HC OXYGEN THERAPY PER DAY

## 2022-10-12 PROCEDURE — 6360000002 HC RX W HCPCS: Performed by: INTERNAL MEDICINE

## 2022-10-12 PROCEDURE — 83735 ASSAY OF MAGNESIUM: CPT

## 2022-10-12 PROCEDURE — 80061 LIPID PANEL: CPT

## 2022-10-12 PROCEDURE — 6370000000 HC RX 637 (ALT 250 FOR IP): Performed by: INTERNAL MEDICINE

## 2022-10-12 PROCEDURE — 84100 ASSAY OF PHOSPHORUS: CPT

## 2022-10-12 PROCEDURE — 2500000003 HC RX 250 WO HCPCS: Performed by: NURSE PRACTITIONER

## 2022-10-12 PROCEDURE — 93306 TTE W/DOPPLER COMPLETE: CPT

## 2022-10-12 PROCEDURE — 2580000003 HC RX 258: Performed by: NURSE PRACTITIONER

## 2022-10-12 PROCEDURE — 6370000000 HC RX 637 (ALT 250 FOR IP): Performed by: STUDENT IN AN ORGANIZED HEALTH CARE EDUCATION/TRAINING PROGRAM

## 2022-10-12 PROCEDURE — 36415 COLL VENOUS BLD VENIPUNCTURE: CPT

## 2022-10-12 PROCEDURE — 99232 SBSQ HOSP IP/OBS MODERATE 35: CPT | Performed by: INTERNAL MEDICINE

## 2022-10-12 PROCEDURE — 6370000000 HC RX 637 (ALT 250 FOR IP): Performed by: NURSE PRACTITIONER

## 2022-10-12 PROCEDURE — 6360000004 HC RX CONTRAST MEDICATION: Performed by: NURSE PRACTITIONER

## 2022-10-12 RX ORDER — MAGNESIUM SULFATE IN WATER 40 MG/ML
2000 INJECTION, SOLUTION INTRAVENOUS ONCE
Status: COMPLETED | OUTPATIENT
Start: 2022-10-12 | End: 2022-10-12

## 2022-10-12 RX ORDER — SPIRONOLACTONE 25 MG/1
25 TABLET ORAL DAILY
Status: DISCONTINUED | OUTPATIENT
Start: 2022-10-12 | End: 2022-10-14 | Stop reason: HOSPADM

## 2022-10-12 RX ORDER — POTASSIUM CHLORIDE 20 MEQ/1
40 TABLET, EXTENDED RELEASE ORAL 2 TIMES DAILY WITH MEALS
Status: DISCONTINUED | OUTPATIENT
Start: 2022-10-12 | End: 2022-10-14

## 2022-10-12 RX ADMIN — ACETAMINOPHEN 650 MG: 325 TABLET ORAL at 15:57

## 2022-10-12 RX ADMIN — LISINOPRIL 20 MG: 20 TABLET ORAL at 06:21

## 2022-10-12 RX ADMIN — SPIRONOLACTONE 25 MG: 25 TABLET ORAL at 10:00

## 2022-10-12 RX ADMIN — MAGNESIUM SULFATE HEPTAHYDRATE 2000 MG: 40 INJECTION, SOLUTION INTRAVENOUS at 09:59

## 2022-10-12 RX ADMIN — METOPROLOL SUCCINATE 100 MG: 100 TABLET, FILM COATED, EXTENDED RELEASE ORAL at 06:22

## 2022-10-12 RX ADMIN — TRIMETHOBENZAMIDE HYDROCHLORIDE 200 MG: 100 INJECTION INTRAMUSCULAR at 20:57

## 2022-10-12 RX ADMIN — PERFLUTREN 1.65 MG: 6.52 INJECTION, SUSPENSION INTRAVENOUS at 08:20

## 2022-10-12 RX ADMIN — BUMETANIDE 1 MG: 0.25 INJECTION, SOLUTION INTRAMUSCULAR; INTRAVENOUS at 20:57

## 2022-10-12 RX ADMIN — APIXABAN 5 MG: 5 TABLET, FILM COATED ORAL at 09:58

## 2022-10-12 RX ADMIN — POTASSIUM CHLORIDE 40 MEQ: 1500 TABLET, EXTENDED RELEASE ORAL at 09:58

## 2022-10-12 RX ADMIN — BUMETANIDE 1 MG: 0.25 INJECTION, SOLUTION INTRAMUSCULAR; INTRAVENOUS at 09:58

## 2022-10-12 RX ADMIN — APIXABAN 5 MG: 5 TABLET, FILM COATED ORAL at 20:57

## 2022-10-12 RX ADMIN — ACETAMINOPHEN 650 MG: 325 TABLET ORAL at 06:21

## 2022-10-12 RX ADMIN — TRIMETHOBENZAMIDE HYDROCHLORIDE 200 MG: 100 INJECTION INTRAMUSCULAR at 15:56

## 2022-10-12 RX ADMIN — Medication 10 ML: at 10:00

## 2022-10-12 RX ADMIN — Medication 10 ML: at 23:40

## 2022-10-12 ASSESSMENT — PAIN SCALES - GENERAL: PAINLEVEL_OUTOF10: 7

## 2022-10-12 NOTE — PROGRESS NOTES
Pharmacy Note    Pat Garrett was ordered Entresto (sacubitril/valsartan) and was previously receiving an ACE inhibitor. Per the [place] product labeling and the restriction criteria of the [facility] Formulary Committees:  [place] is not to be administered within 36 hours of switching from an ACE inhibitor. Therefore, the order for [place] Chao was verified by the Pharmacy Department to start on 10/13/22 (date) at 2100 (time).     Pharmacist: Osman Domingo, [address]  Date:  10/12/2022 8:02 AM

## 2022-10-12 NOTE — PROGRESS NOTES
Physician Progress Note      Kayli Spears  CSN #:                  [id number]  :                       1940  ADMIT DATE:       10/11/2022 1:51 PM  [address] [place] DATE:  RESPONDING  PROVIDER #:        Raul Ortega MD          QUERY TEXT:    Dear Cardiology Consultant,    Pt admitted with Acute on chronic mixed systolic /diastolic HF. Pt noted to   also have HTN, CKD , moderate mitral regurgitation, mild tricuspid   regurgitation and pulmonary hypertension, CAD . If possible, please document   in progress notes and discharge summary the etiology of CHF, if able to be   determined. The medical record reflects the follow ng:  Risk Factors: HTN, CKD , moderate mitral regurgitation, mild tricuspid   regurgitation and pulmonary hypertension, CAD  Clinical Indicators: Per Cardio consult ,\". .. Acute on chronic mixed systolic   /diastolic HF- EF in the last 3 years 35-50% - will repeat echo. Probably   multifactorial AF, LV function,GINA, PPM, diet/Na+. Had been on lisinopril and   valsartan in the past - both listed on home list-seems to be off of it a lot,   valsartan resumed at his last office visit. Would be better to be on entresto,   toprol and spirololactone and his bumex. Liam Le Liam Le \"  BNP 2404  Treatment: IV Bumex    Thank you,  Christiano Plascencia RN CCDS  Clinical Documentation Improvement Specialist  Options provided:  -- CHF due to Hypertensive Heart Disease  -- CHF due to Hypertensive Heart Disease and CAD  -- CHF due to Hypertensive Heart Disease and Valvular Heart Disease  -- CHF due to HTN, CAD and valvular disease  -- Other - I will add my own diagnosis  -- Disagree - Not applicable / Not valid  -- Disagree - Clinically unable to determine / Unknown  -- Refer to Clinical Documentation Reviewer    PROVIDER RESPONSE TEXT:    This patient has CHF due to HTN, CAD and valvular heart disease.     Query created by: Cory Banda on 10/12/2022 11:11 AM      Electronically signed by:  Raul Ortega MD 10/12/2022 1:13 PM

## 2022-10-12 NOTE — CONSULTS
[facility] [address] [place] [address]    Name: Amanda Mark    Age: 80 y.o. Date of Admission: 10/11/2022  1:51 PM    Date of Service: 10/12/2022    Reason for Consultation: CHF    Referring Physician: Dr Melony Siegel  Primary Care Physician: Ailyn Godinez DO    History of Present Illness: The patient is a 80y.o. year old male presenting to the ED yesterday for increase leg edema and SOB for the past week. States weight up 10 lbs. Sleeps in a recliner due to SOB. Admits to eating mostly fast foods and was going out to drink daily, now just [place] and Sat. No chest pains or palpitations. History of hypertension, hyperlipidemia, permanent atrial fibrillation post AV node ablation in the past with permanent single-chamber pacemaker ( about 2.5 years to [facility]). On anticoagulation. Remote PCI of unknown vessel with no ischemia on Lexiscan June 2018. Echo done late 2019 revealed LVEF 35%, moderate mitral regurgitation and pulmonary hypertension. Echocardiogram completed September 4, 2019 revealed moderately severe left ventricular concentric hypertrophy, LVEF 35%, indeterminate diastolic function, moderate mitral regurgitation, mild tricuspid regurgitation and pulmonary hypertension.      Past Medical History:   Past Medical History:   Diagnosis Date    Alcohol abuse     Anemia     Apnea, sleep     Arthritis     Atrial fibrillation (HCC)     Atrial flutter (HCC)     CAD (coronary artery disease)     CHF (congestive heart failure) (HCC)     CKD (chronic kidney disease)     ED (erectile dysfunction)     Hearing loss     Hepatitis C     Hyperkalemia     Hypertension     Obesity     Pacemaker     Vitamin D deficiency        Review of Systems:   Constitutional: No fever, chills, sweats  Cardiac: As per HPI  Pulmonary: No cough, wheeze, hemoptysis  HEENT: No visual disturbances, difficult swallowing  GI: No nausea, vomiting, diarrhea, abdominal pain, rectal bleeding  : No dysuria or hematuria  Endocrine: No excessive thirst, heat or cold intolerance. Musculoskeletal: No joint pain or muscle aches. No claudication  Skin: No skin breakdown or rashes  Neuro: No headache, confusion, or seizures  Psych: No depression, anxiety    Family History:  Family History   Problem Relation Age of Onset    Heart Attack Father     Heart Attack Paternal Aunt     Heart Attack Paternal Uncle     Heart Attack Paternal Cousin        Social History:  Social History     Socioeconomic History    Marital status:      Spouse name: Not on file    Number of children: Not on file    Years of education: Not on file    Highest education level: Not on file   Occupational History    Not on file   Tobacco Use    Smoking status: Former     Packs/day: 3.00     Years: 15.00     Pack years: 45.00     Types: Cigarettes     Quit date:      Years since quittin.8    Smokeless tobacco: Never   Vaping Use    Vaping Use: Never used   Substance and Sexual Activity    Alcohol use: Yes     Alcohol/week: 14.0 standard drinks     Types: 14 Shots of liquor per week     Comment: whiskey a couple times a week    Drug use: Not Currently    Sexual activity: Not on file   Other Topics Concern    Not on file   Social History Narrative    Not on file     Social Determinants of Health     Financial Resource Strain: Not on file   Food Insecurity: Not on file   Transportation Needs: Not on file   Physical Activity: Not on file   Stress: Not on file   Social Connections: Not on file   Intimate Partner Violence: Not on file   Housing Stability: Not on file       Allergies: Allergies   Allergen Reactions    Coumadin [Warfarin Sodium] Other (See Comments)     Excessive bleeding requiring transfusions      Heparin Other (See Comments)     Excessive bleeding requiring transfusions       Home Medications:  Prior to Admission medications    Medication Sig Start Date End Date Taking?  Authorizing Provider   guaiFENesin 400 MG tablet Take 1 tablet by mouth 2 times daily as needed for Cough 8/15/21   Rachael Ravi,    vitamin C (ASCORBIC ACID) 500 MG tablet Take 2 tablets by mouth daily for 10 days 11/22/20 10/11/22  MANUEL Mercado   bumetanide (BUMEX) 1 MG tablet Take 1 tablet by mouth 2 times daily 11/22/20   MANUEL Mercado   valsartan (DIOVAN) 80 MG tablet Take 80 mg by mouth daily    Historical Provider, MD   ibuprofen (IBU) 400 MG tablet Take 1 tablet by mouth every 8 hours as needed for Pain Take with full glass of water  Patient not taking: Reported on 8/16/2021 8/16/20   Desiree Vieira MD   acetaminophen (TYLENOL) 500 MG tablet Take 2 tablets by mouth every 8 hours as needed for Pain  Patient not taking: Reported on 8/16/2021 8/16/20   Desiree Vieira MD   apixaban (ELIQUIS) 5 MG TABS tablet Take by mouth 2 times daily    Historical Provider, MD   lisinopril (PRINIVIL;ZESTRIL) 20 MG tablet Take 1 tablet by mouth daily 9/7/19   Jose De Jesus Bro MD   vitamin B-12 (CYANOCOBALAMIN) 1000 MCG tablet Take 1,000 mcg by mouth daily    Historical Provider, MD   cetirizine (ZYRTEC) 10 MG tablet Take 10 mg by mouth daily.     Historical Provider, MD   metoprolol (TOPROL-XL) 100 MG XL tablet Take 100 mg by mouth daily     Historical Provider, MD       Current Medications:  Current Facility-Administered Medications   Medication Dose Route Frequency Provider Last Rate Last Admin    sodium chloride flush 0.9 % injection 5-40 mL  5-40 mL IntraVENous 2 times per day Derryl Lather, APRN - NP   10 mL at 10/11/22 2011    sodium chloride flush 0.9 % injection 5-40 mL  5-40 mL IntraVENous PRN Derryl Lather, APRN - NP   10 mL at 10/11/22 1647    0.9 % sodium chloride infusion   IntraVENous PRN Derryl Lather, APRN - NP        ondansetron (ZOFRAN-ODT) disintegrating tablet 4 mg  4 mg Oral Q8H PRN Derryl Lather, APRN - NP        Or    ondansetron [facility]) injection 4 mg  4 mg IntraVENous Q6H PRN Radha Sherman, MANUEL - NP polyethylene glycol (GLYCOLAX) packet 17 g  17 g Oral Daily PRN Isabella Gains, APRN - NP        acetaminophen (TYLENOL) tablet 650 mg  650 mg Oral Q6H PRN Isabella Gains, APRN - NP   650 mg at 10/12/22 5757    Or    acetaminophen (TYLENOL) suppository 650 mg  650 mg Rectal Q6H PRN Isabella Gains, APRN - NP        perflutren lipid microspheres (DEFINITY) injection 1.65 mg  1.5 mL IntraVENous ONCE PRN Isabella Gains, APRN - NP        bumetanide (BUMEX) injection 1 mg  1 mg IntraVENous BID Isabella Gains, APRN - NP   1 mg at 10/11/22 1648    apixaban (ELIQUIS) tablet 5 mg  5 mg Oral BID Monisha Galindo MD   5 mg at 10/11/22 2011    lisinopril (PRINIVIL;ZESTRIL) tablet 20 mg  20 mg Oral Daily Monisha Galindo MD   20 mg at 10/12/22 0184    metoprolol succinate (TOPROL XL) extended release tablet 100 mg  100 mg Oral Daily Monisha Galindo MD   100 mg at 10/12/22 0622      sodium chloride         Physical Exam:  /74   Pulse 71   Temp 98.1 °F (36.7 °C) (Axillary)   Resp 18   Ht 5' 10\" (1.778 m)   Wt 261 lb 6.4 oz (118.6 kg)   SpO2 95%   BMI 37.51 kg/m²   Weight change: Wt Readings from Last 3 Encounters:   10/12/22 261 lb 6.4 oz (118.6 kg)   08/14/21 261 lb 6.4 oz (118.6 kg)   11/22/20 262 lb 2 oz (118.9 kg)     General: Awake, alert, oriented x3, no acute distress  HEENT: Normocephalic and atraumatic, extraocular movements intact, pupils equal and round, moist mucus membranes, sclera anicteric  Neck: No JVD, no carotid bruits, no thyromegaly, no adenopathy  Cardiac: irreg irreg, normal S1 and physiologically split S2, no S3, no S4. Apical impulse is nondisplaced. No murmurs, no pericardial rubs, no clicks. Carotid upstrokes brisk. Respiratory: Clear bilaterally; no wheezes, no rales, no rhonchi.   Unlabored respirations  Abdomen: Soft, nontender, nondistended, bowel sounds+, no hepatomegaly, no masses, no abdominal bruits  Extremities: bilateral lower extremity edema, no cyanosis, no clubbing. Distal pulses intact  Skin: Intact, warm and dry, no rashes, no breakdown  Musculoskeletal: normal tone and strength in the upper and lower extremities bilaterally  Neuro: No focal deficits. Moves all extremities appropriately to command. Normal sensation in the upper and lower extremities bilaterally  Psychiatric: Cooperative, and normal affect    Intake/Output:    Intake/Output Summary (Last 24 hours) at 10/12/2022 0735  Last data filed at 10/12/2022 0609  Gross per 24 hour   Intake --   Output 1150 ml   Net -1150 ml     No intake/output data recorded. Laboratory Tests:  Last 3 CBC:  Recent Labs     10/11/22  1400   WBC 6.9   RBC 4.36   HGB 14.5   HCT 43.3   MCV 99.3   MCH 33.3   MCHC 33.5   RDW 13.6      MPV 10.9       Last 3 CMP:    Recent Labs     10/11/22  1400 10/12/22  0250    140   K 3.9 3.1*    98   CO2 34* 33*   BUN 10 10   CREATININE 1.1 1.2   GLUCOSE 119* 101*   CALCIUM 9.9 9.3   PROT 7.0  --    LABALBU 4.4  --    BILITOT 1.3*  --    ALKPHOS 81  --    AST 20  --    ALT 11  --        Last 3 Mag/Phos:  Recent Labs     10/12/22  0250   MG 1.2*       Last 3 CK, CKMB, Troponin  No results for input(s): CKTOTAL, CKMB, TROPONINI in the last 72 hours. Last 3 Pro-BNP:  Recent Labs     10/11/22  1400   PROBNP 2,404*     Lab Results   Component Value Date    PROBNP 2,404 (H) 10/11/2022       Last 3 Glucose:     Recent Labs     10/11/22  1400 10/12/22  0250   GLUCOSE 119* 101*       Last 3 Coags:  No results for input(s): PROTIME, INR, PTT in the last 72 hours.   Lab Results   Component Value Date/Time    PROTIME 14.9 11/21/2020 04:58 PM    PROTIME 11.4 10/31/2010 10:45 AM    INR 1.3 11/21/2020 04:58 PM       Last 3 Lipid Panel:  Recent Labs     10/12/22  0250   LDLCALC 42   HDL 66   TRIG 55     Lab Results   Component Value Date    LDLCALC 42 10/12/2022    LDLCALC 59 09/04/2019     Lab Results   Component Value Date    HDL 66 10/12/2022    HDL 64 09/04/2019     Lab Results Component Value Date    TRIG 55 10/12/2022    TRIG 119 09/04/2019     No components found for: CHLPL    TSH:  No results for input(s): TSH in the last 72 hours. Lab Results   Component Value Date/Time    TSH 0.391 11/21/2020 09:26 PM       ABGs:  No results for input(s): PH, PO2, PCO2, HCO3, BE, O2SAT in the last 72 hours. Lactic Acid:  No results for input(s): LACTA in the last 72 hours. Radiology:  RAD Results:  XR CHEST PORTABLE   Final Result   Mild cardiomegaly with no acute infiltrate or effusion. EKG and Telemetry:  12-lead EKG personally reviewed and shows afib V pacing rate 75    Telemetry personally reviewed and shows atrial fibrillation        ASSESSMENT / PLAN:    1. 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 Probably multifactorial etiology: AF, LV function,GINA, PPM, diet/- heavy sodium intake from fast food diet. Had been on lisinopril and valsartan in the past - both listed on home list-seems to be off of it a lot, valsartan resumed at his last office visit. Would be better to be on entresto, toprol and spirololactone and his bumex. Try to get a hold of [place] records. Replace K+, Continue diuresis. 2          Afib with AV node ablation 2.5 years ago VVIR PPM followed in pacemaker clinic. 3          Anticoagulant therapy eliquis- no bleeding. 4          HTN- stable  5          CAD- stable  6          GINA- refuses therapy but certainly can contribute to HF exacerbations. Thank you for consultation.     Huang Plascencia MD, [facility]  The [address] at [facility]    Electronically signed by Huang Plascencia MD on 10/12/2022 at 7:35 AM

## 2022-10-12 NOTE — PROGRESS NOTES
[facility] Progress Note    Admitting Date and Time: 10/11/2022  1:51 PM  Admit Dx: CHF (congestive heart failure), NYHA class I, acute on chronic, combined ([address] [I50.43]  Acute hypoxemic respiratory failure (HCC) [J96.01]  Acute on chronic congestive heart failure, unspecified heart failure type ([address] [I50.9]    Subjective:  Patient is being followed for CHF (congestive heart failure), NYHA class I, acute on chronic, combined ([address] [I50.43]  Acute hypoxemic respiratory failure ([address] [J96.01]  Acute on chronic congestive heart failure, unspecified heart failure type ([address] [I50.9]     Patient was sitting in bedside chair in no acute distress. He reports chest pressure. Denies any radiation. Associated with SOB. ROS: denies fever, chills, cp, sob, n/v, HA unless stated above.       [START ON 10/13/2022] sacubitril-valsartan  1 tablet Oral BID    potassium chloride  40 mEq Oral BID WC    magnesium sulfate  2,000 mg IntraVENous Once    spironolactone  25 mg Oral Daily    sodium chloride flush  5-40 mL IntraVENous 2 times per day    bumetanide  1 mg IntraVENous BID    apixaban  5 mg Oral BID    metoprolol succinate  100 mg Oral Daily     sodium chloride flush, 5-40 mL, PRN  sodium chloride, , PRN  ondansetron, 4 mg, Q8H PRN   Or  ondansetron, 4 mg, Q6H PRN  polyethylene glycol, 17 g, Daily PRN  acetaminophen, 650 mg, Q6H PRN   Or  acetaminophen, 650 mg, Q6H PRN         Objective:    /74   Pulse 71   Temp 98.1 °F (36.7 °C) (Axillary)   Resp 18   Ht 5' 10\" (1.778 m)   Wt 261 lb 6.4 oz (118.6 kg)   SpO2 95%   BMI 37.51 kg/m²   General Appearance: alert and oriented to person, place and time and in no acute distress  Skin: warm and dry  Head: normocephalic and atraumatic  Eyes: pupils equal, round, and reactive to light, extraocular eye movements intact, conjunctivae normal  Neck: neck supple and non tender without mass   Pulmonary/Chest: clear to auscultation bilaterally- no wheezes, rales or rhonchi, normal air movement, no respiratory distress  Cardiovascular: normal rate, normal S1 and S2 and no carotid bruits  Abdomen: soft, non-tender, non-distended, normal bowel sounds, no masses or organomegaly  Extremities: no cyanosis, no clubbing and 2+ pitting edema   Neurologic: no cranial nerve deficit and speech normal        Recent Labs     10/11/22  1400 10/12/22  0250    140   K 3.9 3.1*    98   CO2 34* 33*   BUN 10 10   CREATININE 1.1 1.2   GLUCOSE 119* 101*   CALCIUM 9.9 9.3       Recent Labs     10/11/22  1400   WBC 6.9   RBC 4.36   HGB 14.5   HCT 43.3   MCV 99.3   MCH 33.3   MCHC 33.5   RDW 13.6      MPV 10.9       Radiology: None    Assessment:    Principal Problem:    CHF (congestive heart failure), NYHA class I, acute on chronic, combined ([address]  Resolved Problems:    * No resolved hospital problems. *      Plan:  1. Acute on chronic HFrEF: Echo in 2019 EF of 30-40%, moderately severe LV concentric hypertrophy. Indeterminate diastolic dysfunction. BNP on admission was 2,404. Received IV Bumex in the ED. Continue IV Bumex BID. Continue Entresto, Toprol and Aldactone. Echo 10/12 showing EF 40%, mild LVH, distal inferior wall hypokinesis, mildly dilated right ventricle, left atrium moderately dilated, mildly enlarged rigth atrium, mild MR and mild TR. Strict I&Os. Cardiac diet. Daily weights. Cardiology consulted. CHF nurse consulted. Tele. 2. Acute hypoxic respiratory failure: Secondary to above. Initially on 4L, has been weaned to  2L  [place]. Wean as tolerated. Monitor. 3. HTN: Continue Toprol XL, Aldactone and Entresto     4. CAD: Lipid panel unremarkable. Continue Toprol XL, Aldactone and Entresto     5. 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 Has not used since. Recommend sleep study outpatient. 6. Hypokalemia: Supplement. Monitor. 7. Hypomagnesemia: Supplement. Monitor.      8. Atrial fibrillation: Ablation 2.5 years ago VVIR PPM. Followed by pacemaker clinic. NOTE: This report was transcribed using voice recognition software. Every effort was made to ensure accuracy; however, inadvertent computerized transcription errors may be present. Electronically signed by Gabby Valdes PA-C on 10/12/2022 at 11:34 AM     27 minutes time spent reviewing patient chart, assessing patient, discussing plan of care with patient and family, discussing plan of care with collaborating physician, and charting. HOSPITALIST ATTENDING PHYSICIAN NOTE 10/12/2022 1718PM:    Details of the evaluation - subjective assessment (including medication profile, past medical, family and social history when applicable), examination, review of lab and test data, diagnostic impressions and medical decision making - performed by Gabby Valdes PA-C, were discussed with me on the date of service and I agree with clinical information herein unless otherwise noted. The patient has been evaluated by me personally earlier today. Pt reports no fevers, chills,n/v.     Exam: heart reg at rate of 72,lungs cta, abd pos bs soft nt, ext neg for le edema    I agree with the assessment and plan of Prachi Garner PA-C    Acute respiratory failure with hypoxia(88%o2sat)POA  Acute on chronic systolic/diastolic chf  Atrial fib  Htn  Hypokalemia  hypomagnesemia    Electronically signed by Pam Ventura D.O.   Hospitalist  [facility]ist [facility] at [facility]

## 2022-10-12 NOTE — CARE COORDINATION
Transition of Care-Met with patient at the bedside for initial assessment. He resides with his girlfriend in a one story home, reports independence with ADL's, however does use a walker and or cane at times if he is feeling weak, also has a shower chair. Patient is currently requiring 2 L of oxygen however SpO2 is 96%, goal to wean today, if oxygen is needed at discharge patient prefers [facility], will need pulse ox testing and DME. Primary care provided at the [facility] (APRN-NP, Tiffany Rey), preferred pharmacy is [facility]. Remains on IV Bumex BID, [facility] is following, anticipate discharge in the next 48 hrs. Patient drove himself here, will drive home at discharge, does not anticipate any needs. CM/SW following for potential oxygen need.     Anisha BARNETTN, RN  [facility]

## 2022-10-12 NOTE — CONSULTS
Nutrition Education    Educated on CHF diet guidelines. Pt aware he needs to limit salt intake, unfortunately he eats out most meals ([employer]), encouragement provided. Pt reports he weighs himself daily. Will continue to monitor per policy. Learners: Patient  Readiness: Acceptance  Method: Explanation and Handout  Response: Needs Reinforcement  Contact name and number provided.     Hanane Oseguera RD, JACE  Contact Number: [phone]

## 2022-10-13 LAB
ADENOVIRUS BY PCR: NOT DETECTED
ANION GAP SERPL CALCULATED.3IONS-SCNC: 7 MMOL/L (ref 7–16)
BORDETELLA PARAPERTUSSIS BY PCR: NOT DETECTED
BORDETELLA PERTUSSIS BY PCR: NOT DETECTED
BUN BLDV-MCNC: 14 MG/DL (ref 6–23)
CALCIUM SERPL-MCNC: 8.5 MG/DL (ref 8.6–10.2)
CHLAMYDOPHILIA PNEUMONIAE BY PCR: NOT DETECTED
CHLORIDE BLD-SCNC: 99 MMOL/L (ref 98–107)
CO2: 33 MMOL/L (ref 22–29)
CORONAVIRUS 229E BY PCR: NOT DETECTED
CORONAVIRUS HKU1 BY PCR: NOT DETECTED
CORONAVIRUS NL63 BY PCR: NOT DETECTED
CORONAVIRUS OC43 BY PCR: NOT DETECTED
CREAT SERPL-MCNC: 1.3 MG/DL (ref 0.7–1.2)
GFR AFRICAN AMERICAN: >60
GFR NON-AFRICAN AMERICAN: 53 ML/MIN/1.73
GLUCOSE BLD-MCNC: 111 MG/DL (ref 74–99)
HUMAN METAPNEUMOVIRUS BY PCR: NOT DETECTED
HUMAN RHINOVIRUS/ENTEROVIRUS BY PCR: NOT DETECTED
INFLUENZA A BY PCR: NOT DETECTED
INFLUENZA B BY PCR: NOT DETECTED
MAGNESIUM: 1.4 MG/DL (ref 1.6–2.6)
MYCOPLASMA PNEUMONIAE BY PCR: NOT DETECTED
PARAINFLUENZA VIRUS 1 BY PCR: NOT DETECTED
PARAINFLUENZA VIRUS 2 BY PCR: NOT DETECTED
PARAINFLUENZA VIRUS 3 BY PCR: NOT DETECTED
PARAINFLUENZA VIRUS 4 BY PCR: NOT DETECTED
POTASSIUM SERPL-SCNC: 3.4 MMOL/L (ref 3.5–5)
RESPIRATORY SYNCYTIAL VIRUS BY PCR: NOT DETECTED
SARS-COV-2, PCR: DETECTED
SODIUM BLD-SCNC: 139 MMOL/L (ref 132–146)

## 2022-10-13 PROCEDURE — 2060000000 HC ICU INTERMEDIATE R&B

## 2022-10-13 PROCEDURE — 0202U NFCT DS 22 TRGT SARS-COV-2: CPT

## 2022-10-13 PROCEDURE — 6370000000 HC RX 637 (ALT 250 FOR IP): Performed by: INTERNAL MEDICINE

## 2022-10-13 PROCEDURE — 2700000000 HC OXYGEN THERAPY PER DAY

## 2022-10-13 PROCEDURE — 6360000002 HC RX W HCPCS: Performed by: NURSE PRACTITIONER

## 2022-10-13 PROCEDURE — 84630 ASSAY OF ZINC: CPT

## 2022-10-13 PROCEDURE — 99233 SBSQ HOSP IP/OBS HIGH 50: CPT | Performed by: INTERNAL MEDICINE

## 2022-10-13 PROCEDURE — 80048 BASIC METABOLIC PNL TOTAL CA: CPT

## 2022-10-13 PROCEDURE — 83735 ASSAY OF MAGNESIUM: CPT

## 2022-10-13 PROCEDURE — 36415 COLL VENOUS BLD VENIPUNCTURE: CPT

## 2022-10-13 PROCEDURE — 2580000003 HC RX 258: Performed by: NURSE PRACTITIONER

## 2022-10-13 PROCEDURE — 6370000000 HC RX 637 (ALT 250 FOR IP): Performed by: STUDENT IN AN ORGANIZED HEALTH CARE EDUCATION/TRAINING PROGRAM

## 2022-10-13 PROCEDURE — 6370000000 HC RX 637 (ALT 250 FOR IP): Performed by: NURSE PRACTITIONER

## 2022-10-13 PROCEDURE — 2500000003 HC RX 250 WO HCPCS: Performed by: NURSE PRACTITIONER

## 2022-10-13 RX ORDER — ASCORBIC ACID 500 MG
500 TABLET ORAL DAILY
Status: DISCONTINUED | OUTPATIENT
Start: 2022-10-13 | End: 2022-10-14 | Stop reason: HOSPADM

## 2022-10-13 RX ORDER — MAGNESIUM SULFATE IN WATER 40 MG/ML
2000 INJECTION, SOLUTION INTRAVENOUS ONCE
Status: COMPLETED | OUTPATIENT
Start: 2022-10-13 | End: 2022-10-13

## 2022-10-13 RX ORDER — DEXAMETHASONE 4 MG/1
6 TABLET ORAL DAILY
Status: DISCONTINUED | OUTPATIENT
Start: 2022-10-13 | End: 2022-10-14 | Stop reason: HOSPADM

## 2022-10-13 RX ORDER — VITAMIN B COMPLEX
1000 TABLET ORAL DAILY
Status: DISCONTINUED | OUTPATIENT
Start: 2022-10-13 | End: 2022-10-14 | Stop reason: HOSPADM

## 2022-10-13 RX ORDER — CETIRIZINE HYDROCHLORIDE 10 MG/1
5 TABLET ORAL DAILY
Status: DISCONTINUED | OUTPATIENT
Start: 2022-10-13 | End: 2022-10-14 | Stop reason: HOSPADM

## 2022-10-13 RX ADMIN — OXYCODONE HYDROCHLORIDE AND ACETAMINOPHEN 500 MG: 500 TABLET ORAL at 16:59

## 2022-10-13 RX ADMIN — DEXAMETHASONE 6 MG: 4 TABLET ORAL at 16:58

## 2022-10-13 RX ADMIN — BUMETANIDE 1 MG: 0.25 INJECTION, SOLUTION INTRAMUSCULAR; INTRAVENOUS at 07:42

## 2022-10-13 RX ADMIN — POTASSIUM CHLORIDE 40 MEQ: 1500 TABLET, EXTENDED RELEASE ORAL at 16:58

## 2022-10-13 RX ADMIN — APIXABAN 5 MG: 5 TABLET, FILM COATED ORAL at 21:26

## 2022-10-13 RX ADMIN — Medication 10 ML: at 07:43

## 2022-10-13 RX ADMIN — SPIRONOLACTONE 25 MG: 25 TABLET ORAL at 07:42

## 2022-10-13 RX ADMIN — Medication 10 ML: at 21:27

## 2022-10-13 RX ADMIN — CETIRIZINE HYDROCHLORIDE 5 MG: 10 TABLET, FILM COATED ORAL at 11:22

## 2022-10-13 RX ADMIN — POTASSIUM CHLORIDE 40 MEQ: 1500 TABLET, EXTENDED RELEASE ORAL at 07:42

## 2022-10-13 RX ADMIN — MAGNESIUM SULFATE HEPTAHYDRATE 2000 MG: 40 INJECTION, SOLUTION INTRAVENOUS at 10:38

## 2022-10-13 RX ADMIN — Medication 1000 UNITS: at 16:58

## 2022-10-13 RX ADMIN — APIXABAN 5 MG: 5 TABLET, FILM COATED ORAL at 07:42

## 2022-10-13 RX ADMIN — SACUBITRIL AND VALSARTAN 1 TABLET: 49; 51 TABLET, FILM COATED ORAL at 21:26

## 2022-10-13 RX ADMIN — BUMETANIDE 1 MG: 0.25 INJECTION, SOLUTION INTRAMUSCULAR; INTRAVENOUS at 21:26

## 2022-10-13 NOTE — H&P
[facility] Addendum    I have participated in the history, exam, medical decision making with Ralph Shaw NP on the date of service. I have also reviewed available labs, imaging / microbiologic / cardiac studies as well as the input of any consultants involved in this case. 80 y.o. male w PMH Afib on Eliquis, HTN, GINA, and obesity who presents to ED 10/11 from home with complaints of SOB, chest pain, and leg swelling. Began ~1 week ago and worsening. Pt sleeping in chair now as he cannot lie flat. He does note he cannot comment on a baseline weight as it varies. He actually also reported not being on a home diuretic. Home med list being verified. Initial evaluation in the ED showed pt was satting 80s on room air but otherwise hemodynamically stable. Workup showed BNP 2400 and slight hyperbilirubinemia but really was otherwise fairly unremarkable. Imaging studies included CXR, which showed cardiomegaly. EKG nonischemic. Pt was given Bumex in the ED and admitted for further evaluation and treatment with consult requests to cardiology. He told cardio he is up ~10 lbs. Cardio feels largely due to [employer] consisting mostly of fast food. They are adjusting medications and attempting to get records from [place].       Objective  Physical Exam  Vitals: BP (!) 96/58   Pulse 74   Temp 98.9 °F (37.2 °C) (Oral)   Resp 20   Ht 5' 10\" (1.778 m)   Wt 258 lb 1.6 oz (117.1 kg)   SpO2 93%   BMI 37.03 kg/m²   General: well-developed, well-nourished, no acute distress, cooperative  Skin: generally warm, dry, and intact, with normal color  HEENT: normocephalic, atraumatic, no gross abnormalities  Respiratory: clear to auscultation bilaterally without respiratory distress  Cardiovascular: regular rate and rhythm without murmur / rub / gallop  Abdominal: soft, nontender, nondistended, normoactive bowel sounds  Extremities: no obvious edema or deformity  Neurologic: awake, alert, no gross deficits  Psychiatric: normal affect, cooperative    Echo results 9/4/2019  Moderately severe left ventricular concentric hypertrophy noted. Ejection fraction is visually estimated at 35 +/- 5%. Septal motion consistent with paced rhythm . Indeterminate diastolic function. The left atrium is moderately dilated. Normal right ventricular size and function. Right ventricle pacemaker lead noted. Moderate mitral regurgitation is present. Mild tricuspid regurgitation. RVSP is 33 mmHg. Echo results 10/12/22  Mild left ventricular concentric hypertrophy noted. Septal motion consistent with paced rhythm . Ejection fraction is visually estimated at 40%. distal inferior wall hypokinesis   Pacer wire visualized in right ventricle. Mildly dilated right ventricle. Normal right ventricle systolic function. The left atrium is moderately dilated. Mildly enlarged right atrium size. Mild mitral regurgitation is present. Mild tricuspid regurgitation. Assessment  Acute hypoxia due to acute-on-chronic HFrEF  Mild hyperbilirubinemia noted incidentally  Hx Afib on Eliquis, HTN, GINA, and obesity    Plan  O2 as needed, wean as able, on 4L in ED, now on 2L  Bumex 1 mg IV bid with close eye on volume status, potassium, and renal function; appears to be -1.5 L so far  Repeat echo as above w EF 40%; no Life Vest  Continue Aldactone; ARB switched to [place] and continue  Cardio input appreciated - adjusting meds and attempting to get records from [place]    Otherwise as per NP's note. Please see orders for further plan of care. Time spent on chart review, clinical exam, discussing case and answering questions with staff, consultants, patient, and family was approximately 25 minutes.     Electronically signed by Aleksandar Bhatia DO on 10/13/2022 at 10:22 AM

## 2022-10-13 NOTE — DISCHARGE INSTRUCTIONS
HEART FAILURE  / CONGESTIVE HEART FAILURE  DISCHARGE INSTRUCTIONS:  GUIDELINES TO FOLLOW AT HOME    Self- Managed Care:     MEDICATIONS:  Take your medication as directed. If you are experiencing any side effects, inform your doctor, Do not stop taking any of your medications without letting your doctor know. Check with your doctor before taking any over-the-counter medications / herbal / or dietary supplements. They may interfere with your other medications. Do not take ibuprofen (Advil or Motrin) and naproxen (Aleve) without talking to your doctor first. They could make your heart failure worse. WEIGHT MONITORING:   Weigh yourself everyday (with the same scale) around the same time of the day and write it down. (you can chart them on a calendar or keep track of them on paper. Notify your doctor of a weight gain of 3 pounds or more in 1 day   OR a total of 5 pounds or more in 1 week    Take your weight record to your doctor visits  Also, the same goes if you loose more than 3# in one day, let your heart doctor know. DIET:   Cardiac heart healthy diet- Low saturated / low trans fat, no added salt, caffeine restricted, Low sodium diet-   No more than 2,000mg (2 grams) of salt / sodium per day (which equals to a little less than  a teaspoon of salt)  If your doctor wants you on a fluid restriction. ..it is usually recommended a fluid limit of 2,000cc -  Fluid restriction- 2,000 ml (milliliters) = 64 ounces = you can have 8 glasses of fluid per day (each glass 8 ounces)    Follow a low salt diet - avoid using salt at the table, avoid / limit use of canned soups, processed / packaged foods, salted snacks, olives and pickles. Do not use a salt substitute without checking with your doctor, they may contain a high amount of potassioum. (Mrs. Lalita Linares is safe to use).     Limit the use of alcohol       CALL YOUR DOCTOR THE FIRST DAY YOU NOTICE ANY OF THESE   SYMPTOMS:  You have a weight gain of 3 pounds or more in 1 day         OR 5 pounds or more in one week  More shortness of breath  More swelling of your stomach, legs, ankles or feet  Feeling more tired, No energy  Dry hacky cough  Dizziness  More chest pain / discomfort       (CALL 911 IF ANY OF THE FOLLOWING OCCURS  Chest pain (not relieved with nitroglycerine, if you have been prescribed this medication)  Severe shortness of breath  Faint / Pass out  Confusion / cannot think clearly  If symptoms get worse           SMOKING - TOBACCO USE:  * IF YOU SMOKE - STOP! Kick the habit. [address] Program is offered at [facility] and [address] [facility]. Call [phone] extension [phone] for more information. ACTIVITY:   (Ask your doctor when you will be able to return to work and before starting any exercise program.  Do not drive unless unless your doctor has given you permission to do so). Start light exercise. Even if you can only do a small amount, exercise will help you get stronger, have more energy, help manage your weight and decrease  stress. Walking is an easy way to get exercise. Start out slowly and  increase the amount you walk as tolerated  If you become short of breath, dizzy or have chest pain; stop, sit down, and rest.  If you feel \"wiped out\" the day after you exercise, walk at a slower pace or for a shorter distance. You can gradually increase the pace or amount of time. (Do not exercise right after a meal or in extreme temperatures, such as above 85 degrees, if the air is really humid, or wind chill is less than 20 degrees)                                             ADDITIONAL INFORMATION:  Avoid getting sick from colds and the flu. Stay away from friends or family that you know may have a contagious illness  Get plenty of rest   Get a flu shot each year. Get a pneumococcal vaccine shot.  If you have had one before, ask your doctor whether you need another dose. My Goal for Self-management of Heart Failure Includes 5 steps :    1. Notice a change in symptoms ( weight gain, short of breath, leg swelling, decreased activity level, bloating. ...)    2. Evaluate the change: (use the Heart Failure Zones )     3. Decide to take action: decide what your options are, such as: (call your doctor for an extra visit, take a prescribed medication, such as your water pill if your doctor has given you directions to do so, [address])    4. Come up with a strategy:  (now you call the doctor for advice / appointment. This is where you take action!!! Do not wait, catch the symptom early and treat it before it worsens. 5. Evaluate the response: The next day, check your Heart Failure Zones: are you in the GREEN ZONE (safe zone)? Worsening symptoms of YELLOW ZONE? Or have you moved to the RED ZONE and need to call 911 or go to the Emergency Room for evaluation? Call your doctor's office to update them on your symptoms of heart failure.

## 2022-10-13 NOTE — CARE COORDINATION
Transition of Care-Patient remains on IV Bumex BID as well as oxygen. Patient requested [facility], referral made to [facility] in event patient does need as he was not weaned yesterday. [facility] following. MARY 24 hrs. Discharge plan is home, patient will drive himself home, no anticipated needs, other than oxygen-will need pulse ox testing.     Joseph BARNETTN, RN  [facility]

## 2022-10-13 NOTE — PLAN OF CARE
Problem: ABCDS Injury Assessment  Goal: Absence of physical injury  Outcome: Progressing     Problem: Discharge Planning  Goal: Discharge to home or other facility with appropriate resources  Outcome: Progressing     Problem: Safety - Adult  Goal: Free from fall injury  Outcome: Progressing     Problem: Chronic Conditions and Co-morbidities  Goal: Patient's chronic conditions and co-morbidity symptoms are monitored and maintained or improved  Outcome: Progressing

## 2022-10-13 NOTE — PLAN OF CARE
Patient's chart updated to reflect:      . - HF care plan, HF education points and HF discharge instructions.  -Orders: 2 gram sodium diet, daily weights, I/O.  -PCP and cardiology follow up appointments to be scheduled within 7 days of hospital discharge. -CHF education session will be provided to the patient prior to hospital discharge.        Yajaira Garcia RN MSN  Heart Failure Navigator

## 2022-10-13 NOTE — PROGRESS NOTES
[facility] Progress Note    Admitting Date and Time: 10/11/2022  1:51 PM  Admit Dx: CHF (congestive heart failure), NYHA class I, acute on chronic, combined ([address] [I50.43]  Acute hypoxemic respiratory failure (HCC) [J96.01]  Acute on chronic congestive heart failure, unspecified heart failure type ([address] [I50.9]    Subjective:  Patient is being followed for CHF (congestive heart failure), NYHA class I, acute on chronic, combined ([address] [I50.43]  Acute hypoxemic respiratory failure ([address] [J96.01]  Acute on chronic congestive heart failure, unspecified heart failure type ([address] [I50.9]     Patient seen siting up in chair. He is alert and in no distress. He is on 3 L nc. Respirations are unlabored. Bilateral LE with +2 pitting edema. Pt c/o sinus congestion and diarrhea. No acute issues at this time. ROS: denies fever, chills, cp, sob, n/v, HA unless stated above.       sacubitril-valsartan  1 tablet Oral BID    potassium chloride  40 mEq Oral BID WC    spironolactone  25 mg Oral Daily    sodium chloride flush  5-40 mL IntraVENous 2 times per day    bumetanide  1 mg IntraVENous BID    apixaban  5 mg Oral BID    metoprolol succinate  100 mg Oral Daily     trimethobenzamide, 200 mg, Q6H PRN  sodium chloride flush, 5-40 mL, PRN  sodium chloride, , PRN  polyethylene glycol, 17 g, Daily PRN  acetaminophen, 650 mg, Q6H PRN   Or  acetaminophen, 650 mg, Q6H PRN         Objective:    BP (!) 96/58   Pulse 74   Temp 98.9 °F (37.2 °C) (Oral)   Resp 20   Ht 5' 10\" (1.778 m)   Wt 258 lb 1.6 oz (117.1 kg)   SpO2 96%   BMI 37.03 kg/m²     General Appearance: alert and oriented to person, place and time and in no acute distress  Skin: warm and dry  Head: normocephalic and atraumatic  Eyes: pupils equal, round, and reactive to light, extraocular eye movements intact, conjunctivae normal  Neck: neck supple and non tender without mass   Pulmonary/Chest: clear to auscultation bilaterally- no wheezes, rales or rhonchi, normal air movement, no respiratory distress  Cardiovascular: normal rate, normal S1 and S2 and no carotid bruits  Abdomen: soft, non-tender, non-distended, normal bowel sounds, no masses or organomegaly  Extremities:+2 bilateral pitting edema on LE  Neurologic: no cranial nerve deficit and speech normal        Recent Labs     10/11/22  1400 10/12/22  0250 10/13/22  0414    140 139   K 3.9 3.1* 3.4*    98 99   CO2 34* 33* 33*   BUN 10 10 14   CREATININE 1.1 1.2 1.3*   GLUCOSE 119* 101* 111*   CALCIUM 9.9 9.3 8.5*       Recent Labs     10/11/22  1400   WBC 6.9   RBC 4.36   HGB 14.5   HCT 43.3   MCV 99.3   MCH 33.3   MCHC 33.5   RDW 13.6      MPV 10.9           Assessment:    Principal Problem:    CHF (congestive heart failure), NYHA class I, acute on chronic, combined ([facility])  Active Problems:    Acute respiratory failure with hypoxia ([facility])    Hypokalemia    Hypomagnesemia    Acute combined systolic and diastolic heart failure ([facility])  Resolved Problems:    * No resolved hospital problems. *      Plan:    Acute on chronic HFrEF: Echo in 2019 EF of 30-40%, moderately severe LV concentric hypertrophy. Indeterminate diastolic dysfunction. BNP on admission was 2,404. Received IV Bumex in the ED. Continue IV Bumex BID. Continue Entresto, Toprol and Aldactone. Echo completed on 10/12 showing EF 40%, mild LVH, distal inferior wall hypokinesis, mildly dilated right ventricle, left atrium moderately dilated, mildly enlarged rigth atrium, mild MR and mild TR. Continue to diurese with IV Bumex BID and aldactone daily. Continue Entresto. Monitor Strict I&Os and daily weights. Continue cardiac diet. CHF nurse consulted- cardiology input appreciated  Acute hypoxic respiratory failure: Secondary to #1. Initially on 4L weaned 3L NC. Wean as tolerated to maintain saturation > 92%. Monitor. HTN: Continue Toprol XL and diuretics  CAD: Lipid panel unremarkable.    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 Has not used since. Recommend sleep study outpatient. Hypokalemia: secondary to diuresis. Continue BID replacement. Monitor BMP. Hypomagnesemia: Mg1.4 today - replaced with 2g magnesium sulfate. Monitor   Atrial fibrillation: Hx coy ablation 2.5 years ago VVIR PPM. Follows with pacemaker clinic outpatient. Currently Vpaced. Continue Toprol XL. Diarrhea and sinus congestion: Started on zyrtec- patient states he takes at home. Get resp viral panel. In review of EMR, valuation, management, and diagnosis. Care plan has been discussed with attending. Time spent 25 minutes    NOTE: This report was transcribed using voice recognition software. Every effort was made to ensure accuracy; however, inadvertent computerized transcription errors may be present.   Electronically signed by MANUEL East CNP on 10/13/2022 at 8:42 AM

## 2022-10-13 NOTE — PROGRESS NOTES
[facility] Addendum    I have participated in the history, exam, medical decision making with Erika Mascorro NP on the date of service. I have also reviewed available labs, imaging / microbiologic / cardiac studies as well as the input of any consultants involved in this case. 80 y.o. male w PMH Afib on Eliquis, HTN, GINA, and obesity who presents to ED 10/11 from home with complaints of SOB, chest pain, and leg swelling. Began ~1 week ago and worsening. Pt sleeping in chair now as he cannot lie flat. He does note he cannot comment on a baseline weight as it varies. He actually also reported not being on a home diuretic. Home med list being verified. Initial evaluation in the ED showed pt was satting 80s on room air but otherwise hemodynamically stable. Workup showed BNP 2400 and slight hyperbilirubinemia but really was otherwise fairly unremarkable. Imaging studies included CXR, which showed cardiomegaly. EKG nonischemic. Pt was given Bumex in the ED and admitted for further evaluation and treatment with consult requests to cardiology. He told cardio he is up ~10 lbs. Cardio feels largely due to [employer] consisting mostly of fast food. They are adjusting medications and attempting to get records from [place].       Objective  Physical Exam  Vitals: BP (!) 96/58   Pulse 74   Temp 98.9 °F (37.2 °C) (Oral)   Resp 20   Ht 5' 10\" (1.778 m)   Wt 258 lb 1.6 oz (117.1 kg)   SpO2 93%   BMI 37.03 kg/m²   General: well-developed, well-nourished, no acute distress, cooperative  Skin: generally warm, dry, and intact, with normal color  HEENT: normocephalic, atraumatic, no gross abnormalities  Respiratory: clear to auscultation bilaterally without respiratory distress  Cardiovascular: regular rate and rhythm without murmur / rub / gallop  Abdominal: soft, nontender, nondistended, normoactive bowel sounds  Extremities: no obvious edema or deformity  Neurologic: awake, alert, no gross deficits  Psychiatric: normal affect, cooperative    Echo results 9/4/2019  Moderately severe left ventricular concentric hypertrophy noted. Ejection fraction is visually estimated at 35 +/- 5%. Septal motion consistent with paced rhythm . Indeterminate diastolic function. The left atrium is moderately dilated. Normal right ventricular size and function. Right ventricle pacemaker lead noted. Moderate mitral regurgitation is present. Mild tricuspid regurgitation. RVSP is 33 mmHg. Echo results 10/12/22  Mild left ventricular concentric hypertrophy noted. Septal motion consistent with paced rhythm . Ejection fraction is visually estimated at 40%. distal inferior wall hypokinesis   Pacer wire visualized in right ventricle. Mildly dilated right ventricle. Normal right ventricle systolic function. The left atrium is moderately dilated. Mildly enlarged right atrium size. Mild mitral regurgitation is present. Mild tricuspid regurgitation. Assessment  Acute hypoxia due to acute-on-chronic HFrEF  Mild hyperbilirubinemia noted incidentally  Hx Afib on Eliquis, HTN, GINA, and obesity    Plan  O2 as needed, wean as able, on 4L in ED, now on 2L  Bumex 1 mg IV bid with close eye on volume status, potassium, and renal function; appears to be -1.5 L so far  Repeat echo as above w EF 40%; no Life Vest  Continue Aldactone; ARB switched to [place] [place] and continue  Cardio input appreciated - adjusting meds and attempting to get records from [place]    Otherwise as per NP's note. Please see orders for further plan of care. Time spent on chart review, clinical exam, discussing case and answering questions with staff, consultants, patient, and family was approximately 25 minutes.     Electronically signed by Ericka Fischer DO on 10/13/2022 at 2:01 PM

## 2022-10-13 NOTE — PROGRESS NOTES
[facility] [address]    Name: Nedra Tripathi    Age: 80 y.o. PCP: Alice Cabrales,     Date of Admission: 10/11/2022  1:51 PM    Date of Service: 10/13/2022    Chief Complaint: Follow-up for CHF       History of Present Illness: The patient is a 80y.o. year old male presenting to the ED yesterday for increase leg edema and SOB for the past week. States weight up 10 lbs. Sleeps in a recliner due to SOB. Admits to eating mostly fast foods and was going out to drink daily, now just [place] and Sat. No chest pains or palpitations. History of hypertension, hyperlipidemia, permanent atrial fibrillation post AV node ablation in the past with permanent single-chamber pacemaker ( about 2.5 years to [facility]). On anticoagulation. Remote PCI of unknown vessel with no ischemia on Lexiscan June 2018. Echo done late 2019 revealed LVEF 35%, moderate mitral regurgitation and pulmonary hypertension. Echocardiogram completed September 4, 2019 revealed moderately severe left ventricular concentric hypertrophy, LVEF 35%, indeterminate diastolic function, moderate mitral regurgitation, mild tricuspid regurgitation and pulmonary hypertension. Interim History:  No new overnight cardiac complaints. Currently with no complaints of CP,  palpitations, dizziness, or lightheadedness. States did use th e bathroom 7 times overnight, urinating and diarrhea. Hasn't noted any change in edema. K+ 3.4, Mg 1.4-getting replaced    Telemetry personally reviewed and showed atrial fibrillation PVC and v pacing      Review of Systems:   Cardiac: As per HPI  General: No fever, chills  Pulmonary: No cough, wheeze, or shortness of breath  GI: No nausea, vomiting,or abdominal pain  Neuro: No headache or confusion    Problem List:  Principal Problem:    CHF (congestive heart failure), NYHA class I, acute on chronic, combined ([facility] [address]  Active Problems:    Acute respiratory failure with hypoxia (HCC)    Hypokalemia Hypomagnesemia    Acute combined systolic and diastolic heart failure (HCC)  Resolved Problems:    * No resolved hospital problems. *      Past Medical History:  Past Medical History:   Diagnosis Date    Alcohol abuse     Anemia     Apnea, sleep     Arthritis     Atrial fibrillation (HCC)     Atrial flutter (HCC)     CAD (coronary artery disease)     CHF (congestive heart failure) (HCC)     CKD (chronic kidney disease)     ED (erectile dysfunction)     Hearing loss     Hepatitis C     Hyperkalemia     Hypertension     Obesity     Pacemaker     Vitamin D deficiency        Allergies:   Allergies   Allergen Reactions    Coumadin [Warfarin Sodium] Other (See Comments)     Excessive bleeding requiring transfusions      Heparin Other (See Comments)     Excessive bleeding requiring transfusions       Current Medications:  Current Facility-Administered Medications   Medication Dose Route Frequency Provider Last Rate Last Admin    magnesium sulfate 2000 mg in 50 mL IVPB premix  2,000 mg IntraVENous Once Portia Art APRN - CNP 25 mL/hr at 10/13/22 1038 2,000 mg at 10/13/22 1038    cetirizine (ZYRTEC) tablet 5 mg  5 mg Oral Daily Chioma Shantel Carrier, APRN - CNP        sacubitril-valsartan (ENTRESTO) 49-51 MG per tablet 1 tablet  1 tablet Oral BID Piedad Cummins MD        potassium chloride (KLOR-CON M) extended release tablet 40 mEq  40 mEq Oral BID WC Piedad Cummins MD   40 mEq at 10/13/22 8926    spironolactone (ALDACTONE) tablet 25 mg  25 mg Oral Daily Piedad Cummins MD   25 mg at 10/13/22 8090    trimethobenzamide (TIGAN) injection 200 mg  200 mg IntraMUSCular Q6H PRN Isidro Hric, DO   200 mg at 10/12/22 2057    sodium chloride flush 0.9 % injection 5-40 mL  5-40 mL IntraVENous 2 times per day MANUEL Terrell - NP   10 mL at 10/13/22 0743    sodium chloride flush 0.9 % injection 5-40 mL  5-40 mL IntraVENous PRN MANUEL Terrell NP   10 mL at 10/11/22 1647    0.9 % sodium chloride infusion IntraVENous PRN Melvia Hamper, APRN - NP        polyethylene glycol [facility]) packet 17 g  17 g Oral Daily PRN Melvia Hamper, APRN - NP        acetaminophen (TYLENOL) tablet 650 mg  650 mg Oral Q6H PRN Melvia Hamper, APRN - NP   650 mg at 10/12/22 1557    Or    acetaminophen (TYLENOL) suppository 650 mg  650 mg Rectal Q6H PRN Melvia Hamper, APRN - NP        bumetanide (BUMEX) injection 1 mg  1 mg IntraVENous BID Melvia Hamper, APRN - NP   1 mg at 10/13/22 5329    apixaban (ELIQUIS) tablet 5 mg  5 mg Oral BID Ashley Cage MD   5 mg at 10/13/22 5900    metoprolol succinate (TOPROL XL) extended release tablet 100 mg  100 mg Oral Daily Ashley Cage MD   100 mg at 10/12/22 0622      sodium chloride         Physical Exam:  BP (!) 96/58   Pulse 74   Temp 98.9 °F (37.2 °C) (Oral)   Resp 20   Ht 5' 10\" (1.778 m)   Wt 258 lb 1.6 oz (117.1 kg)   SpO2 93%   BMI 37.03 kg/m²   Weight change: -6 lb 14.4 oz (-3.13 kg)  Wt Readings from Last 3 Encounters:   10/13/22 258 lb 1.6 oz (117.1 kg)   08/14/21 261 lb 6.4 oz (118.6 kg)   11/22/20 262 lb 2 oz (118.9 kg)     General: Awake, alert, oriented x3, no acute distress  Neck: No JVD, carotid bruits, thyromegaly, or lymphadenopathy  Cardiac: Regular rate and rhythm, normal S1 and split S2, no murmurs, no S3 or S4, no pericardial rubs. Carotid upstrokes brisk  Resp: clear bilaterally without wheezes, rhonchi, or rales; unlabored respirations  Abdomen: soft, nontender, nondistended, BS+; no masses, bruits, or hepatomegaly  Extremities: no cyanosis, clubbing, ++ bilateral LE edema  Skin: Warm and dry, no rashes or lesions  Neuro: moves all extremities to command, no focal deficits noted    Intake/Output:    Intake/Output Summary (Last 24 hours) at 10/13/2022 1119  Last data filed at 10/12/2022 1848  Gross per 24 hour   Intake --   Output 300 ml   Net -300 ml     No intake/output data recorded.     Laboratory Tests:  Last 3 CBC:  Recent Labs 10/11/22  1400   WBC 6.9   RBC 4.36   HGB 14.5   HCT 43.3   MCV 99.3   MCH 33.3   MCHC 33.5   RDW 13.6      MPV 10.9       Last 3 CMP:    Recent Labs     10/11/22  1400 10/12/22  0250 10/13/22  0414    140 139   K 3.9 3.1* 3.4*    98 99   CO2 34* 33* 33*   BUN 10 10 14   CREATININE 1.1 1.2 1.3*   GLUCOSE 119* 101* 111*   CALCIUM 9.9 9.3 8.5*   PROT 7.0  --   --    LABALBU 4.4  --   --    BILITOT 1.3*  --   --    ALKPHOS 81  --   --    AST 20  --   --    ALT 11  --   --        Last 3 Mag/Phos:  Recent Labs     10/12/22  0250 10/13/22  0414   MG 1.2* 1.4*   PHOS 2.5  --        Last 3 CK, CKMB, Troponin  No results for input(s): CKTOTAL, CKMB, TROPONINI in the last 72 hours. Last 3 BNP:  No results for input(s): BNP in the last 72 hours. No results found for: BNP    Last 3 Glucose:     Recent Labs     10/11/22  1400 10/12/22  0250 10/13/22  0414   GLUCOSE 119* 101* 111*       Last 3 Coags:  No results for input(s): PROTIME, INR, PTT in the last 72 hours. Lab Results   Component Value Date/Time    PROTIME 14.9 11/21/2020 04:58 PM    PROTIME 11.4 10/31/2010 10:45 AM    INR 1.3 11/21/2020 04:58 PM       Last 3 Lipid Panel:  Recent Labs     10/12/22  0250   LDLCALC 42   HDL 66   TRIG 55     Lab Results   Component Value Date    LDLCALC 42 10/12/2022    LDLCALC 59 09/04/2019     Lab Results   Component Value Date    HDL 66 10/12/2022    HDL 64 09/04/2019     Lab Results   Component Value Date    TRIG 55 10/12/2022    TRIG 119 09/04/2019     No components found for: CHLPL    TSH:  No results for input(s): TSH in the last 72 hours. Lab Results   Component Value Date/Time    TSH 0.391 11/21/2020 09:26 PM           Radiology:  XR CHEST PORTABLE   Final Result   Mild cardiomegaly with no acute infiltrate or effusion. ECHO Summary   Mild left ventricular concentric hypertrophy noted. Septal motion consistent with paced rhythm . Ejection fraction is visually estimated at 40%.    distal inferior wall hypokinesis   Pacer wire visualized in right ventricle. Mildly dilated right ventricle. Normal right ventricle systolic function. The left atrium is moderately dilated. Mildly enlarged right atrium size. Mild mitral regurgitation is present. Mild tricuspid regurgitation. ASSESSMENT / PLAN:  1. Acute on chronic mixed systolic /diastolic HF- EF in the last 3 years 35-50% - Echo yesterday 40% EF Multifactorial etiology: AF, LV function,GINA, PPM, diet/with heavy sodium intake from fast food diet. Had been on lisinopril and valsartan in the past - both listed on home list-seems to be off of it a lot, valsartan resumed at his last office visit. Would be better to be on entresto, toprol and spirololactone and his bumex. Try to get a hold of [address] records. Weight improved Replace K+, Mg+ Continue diuresis. 2          Afib with AV node ablation 2.5 years ago VVIR PPM followed in pacemaker clinic. 3          Anticoagulant therapy eliquis- no bleeding. 4          HTN- stable  5          CAD- stable  6          GINA- refuses therapy but certainly can contribute to HF exacerbations.              Bard Amber MD,  [facility]  The [address] at [facility]    Electronically signed by Bard Amber MD on 10/13/2022 at 11:19 AM

## 2022-10-14 VITALS
DIASTOLIC BLOOD PRESSURE: 80 MMHG | WEIGHT: 258 LBS | RESPIRATION RATE: 18 BRPM | TEMPERATURE: 97.8 F | HEART RATE: 72 BPM | HEIGHT: 70 IN | BODY MASS INDEX: 36.94 KG/M2 | SYSTOLIC BLOOD PRESSURE: 113 MMHG | OXYGEN SATURATION: 94 %

## 2022-10-14 LAB
ANION GAP SERPL CALCULATED.3IONS-SCNC: 9 MMOL/L (ref 7–16)
BUN BLDV-MCNC: 21 MG/DL (ref 6–23)
C-REACTIVE PROTEIN: 7 MG/DL (ref 0–0.4)
CALCIUM SERPL-MCNC: 9.1 MG/DL (ref 8.6–10.2)
CHLORIDE BLD-SCNC: 97 MMOL/L (ref 98–107)
CO2: 30 MMOL/L (ref 22–29)
CREAT SERPL-MCNC: 1.3 MG/DL (ref 0.7–1.2)
D DIMER: 319 NG/ML DDU
GFR AFRICAN AMERICAN: >60
GFR NON-AFRICAN AMERICAN: 53 ML/MIN/1.73
GLUCOSE BLD-MCNC: 167 MG/DL (ref 74–99)
MAGNESIUM: 1.8 MG/DL (ref 1.6–2.6)
POTASSIUM SERPL-SCNC: 3.9 MMOL/L (ref 3.5–5)
PRO-BNP: 1221 PG/ML (ref 0–450)
PROCALCITONIN: 0.06 NG/ML (ref 0–0.08)
SEDIMENTATION RATE, ERYTHROCYTE: 55 MM/HR (ref 0–15)
SODIUM BLD-SCNC: 136 MMOL/L (ref 132–146)

## 2022-10-14 PROCEDURE — 6370000000 HC RX 637 (ALT 250 FOR IP): Performed by: STUDENT IN AN ORGANIZED HEALTH CARE EDUCATION/TRAINING PROGRAM

## 2022-10-14 PROCEDURE — 6360000002 HC RX W HCPCS: Performed by: NURSE PRACTITIONER

## 2022-10-14 PROCEDURE — 99239 HOSP IP/OBS DSCHRG MGMT >30: CPT | Performed by: INTERNAL MEDICINE

## 2022-10-14 PROCEDURE — 85378 FIBRIN DEGRADE SEMIQUANT: CPT

## 2022-10-14 PROCEDURE — 80048 BASIC METABOLIC PNL TOTAL CA: CPT

## 2022-10-14 PROCEDURE — 36415 COLL VENOUS BLD VENIPUNCTURE: CPT

## 2022-10-14 PROCEDURE — 85651 RBC SED RATE NONAUTOMATED: CPT

## 2022-10-14 PROCEDURE — 2500000003 HC RX 250 WO HCPCS: Performed by: NURSE PRACTITIONER

## 2022-10-14 PROCEDURE — 84145 PROCALCITONIN (PCT): CPT

## 2022-10-14 PROCEDURE — 83880 ASSAY OF NATRIURETIC PEPTIDE: CPT

## 2022-10-14 PROCEDURE — 2700000000 HC OXYGEN THERAPY PER DAY

## 2022-10-14 PROCEDURE — 2580000003 HC RX 258: Performed by: NURSE PRACTITIONER

## 2022-10-14 PROCEDURE — 86140 C-REACTIVE PROTEIN: CPT

## 2022-10-14 PROCEDURE — 6370000000 HC RX 637 (ALT 250 FOR IP): Performed by: INTERNAL MEDICINE

## 2022-10-14 PROCEDURE — 6370000000 HC RX 637 (ALT 250 FOR IP): Performed by: NURSE PRACTITIONER

## 2022-10-14 PROCEDURE — 83735 ASSAY OF MAGNESIUM: CPT

## 2022-10-14 RX ORDER — POTASSIUM CHLORIDE 20 MEQ/1
40 TABLET, EXTENDED RELEASE ORAL DAILY
Qty: 60 TABLET | Refills: 3 | Status: ON HOLD | OUTPATIENT
Start: 2022-10-15

## 2022-10-14 RX ORDER — SPIRONOLACTONE 25 MG/1
25 TABLET ORAL DAILY
Qty: 30 TABLET | Refills: 3 | Status: ON HOLD | OUTPATIENT
Start: 2022-10-14

## 2022-10-14 RX ORDER — METOPROLOL SUCCINATE 100 MG/1
100 TABLET, EXTENDED RELEASE ORAL DAILY
Qty: 30 TABLET | Refills: 3 | Status: ON HOLD | OUTPATIENT
Start: 2022-10-15

## 2022-10-14 RX ORDER — POTASSIUM CHLORIDE 20 MEQ/1
40 TABLET, EXTENDED RELEASE ORAL DAILY
Status: DISCONTINUED | OUTPATIENT
Start: 2022-10-15 | End: 2022-10-14 | Stop reason: HOSPADM

## 2022-10-14 RX ORDER — BUMETANIDE 1 MG/1
1 TABLET ORAL 2 TIMES DAILY
Status: DISCONTINUED | OUTPATIENT
Start: 2022-10-14 | End: 2022-10-14 | Stop reason: HOSPADM

## 2022-10-14 RX ADMIN — CETIRIZINE HYDROCHLORIDE 5 MG: 10 TABLET, FILM COATED ORAL at 09:19

## 2022-10-14 RX ADMIN — POTASSIUM CHLORIDE 40 MEQ: 1500 TABLET, EXTENDED RELEASE ORAL at 09:19

## 2022-10-14 RX ADMIN — Medication 10 ML: at 11:03

## 2022-10-14 RX ADMIN — APIXABAN 5 MG: 5 TABLET, FILM COATED ORAL at 09:19

## 2022-10-14 RX ADMIN — Medication 1000 UNITS: at 09:19

## 2022-10-14 RX ADMIN — OXYCODONE HYDROCHLORIDE AND ACETAMINOPHEN 500 MG: 500 TABLET ORAL at 09:19

## 2022-10-14 RX ADMIN — DEXAMETHASONE 6 MG: 4 TABLET ORAL at 09:19

## 2022-10-14 RX ADMIN — BUMETANIDE 1 MG: 0.25 INJECTION, SOLUTION INTRAMUSCULAR; INTRAVENOUS at 09:19

## 2022-10-14 RX ADMIN — METOPROLOL SUCCINATE 100 MG: 100 TABLET, FILM COATED, EXTENDED RELEASE ORAL at 09:19

## 2022-10-14 RX ADMIN — SPIRONOLACTONE 25 MG: 25 TABLET ORAL at 09:18

## 2022-10-14 RX ADMIN — SACUBITRIL AND VALSARTAN 1 TABLET: 49; 51 TABLET, FILM COATED ORAL at 11:03

## 2022-10-14 NOTE — PROGRESS NOTES
[facility] [address]    Name: Lindsey Cheng    Age: 80 y.o. PCP: Jason Webb DO    Date of Admission: 10/11/2022  1:51 PM    Date of Service: 10/14/2022    Chief Complaint: Follow-up for CHF       History of Present Illness: The patient is a 80y.o. year old male presenting to the ED yesterday for increase leg edema and SOB for the past week. States weight up 10 lbs. Sleeps in a recliner due to SOB. Admits to eating mostly fast foods and was going out to drink daily, now just [place] and Sat. No chest pains or palpitations. History of hypertension, hyperlipidemia, permanent atrial fibrillation post AV node ablation in the past with permanent single-chamber pacemaker ( about 2.5 years to [facility]). On anticoagulation. Remote PCI of unknown vessel with no ischemia on Lexiscan June 2018. Echo done late 2019 revealed LVEF 35%, moderate mitral regurgitation and pulmonary hypertension. Echocardiogram completed September 4, 2019 revealed moderately severe left ventricular concentric hypertrophy, LVEF 35%, indeterminate diastolic function, moderate mitral regurgitation, mild tricuspid regurgitation and pulmonary hypertension. Interim History:  No new overnight cardiac complaints. Currently with no complaints of CP,  palpitations, dizziness, or lightheadedness. States did use th e bathroom 7 times overnight, urinating and diarrhea. Hasn't noted any change in edema. K+ 3.4, Mg 1.4-getting replaced    Telemetry personally reviewed and showed atrial fibrillation PVC and v pacing    Interim 10/14/22:  States he feels back to baseline and wants to go home. Had a positive COVID test yesterday.  No chest pain, palpitations , States breathing is back to normal.    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 *      Past Medical History:  Past Medical History:   Diagnosis Date    Alcohol abuse     Anemia     Apnea, sleep     Arthritis     Atrial fibrillation (HCC)     Atrial flutter (HCC)     CAD (coronary artery disease)     CHF (congestive heart failure) (HCC)     CKD (chronic kidney disease)     ED (erectile dysfunction)     Hearing loss     Hepatitis C     Hyperkalemia     Hypertension     Obesity     Pacemaker     Vitamin D deficiency        Allergies:   Allergies   Allergen Reactions    Coumadin [Warfarin Sodium] Other (See Comments)     Excessive bleeding requiring transfusions      Heparin Other (See Comments)     Excessive bleeding requiring transfusions       Current Medications:  Current Facility-Administered Medications   Medication Dose Route Frequency Provider Last Rate Last Admin    cetirizine (ZYRTEC) tablet 5 mg  5 mg Oral Daily Keshia Speed, APRN - CNP   5 mg at 10/14/22 0919    dexamethasone (DECADRON) tablet 6 mg  6 mg Oral Daily Keshia Speed, APRN - CNP   6 mg at 10/14/22 5469    ascorbic acid (VITAMIN C) tablet 500 mg  500 mg Oral Daily Keshia Speed, APRN - CNP   500 mg at 10/14/22 0919    Vitamin D (CHOLECALCIFEROL) tablet 1,000 Units  1,000 Units Oral Daily Keshia Speed, APRN - CNP   1,000 Units at 10/14/22 0919    sacubitril-valsartan (ENTRESTO) 49-51 MG per tablet 1 tablet  1 tablet Oral BID Juani Chiu MD   1 tablet at 10/13/22 2126    potassium chloride (KLOR-CON M) extended release tablet 40 mEq  40 mEq Oral BID  Juani Chiu MD   40 mEq at 10/14/22 0919    spironolactone (ALDACTONE) tablet 25 mg  25 mg Oral Daily Alfonso Gates Abebe Valentino MD   25 mg at 10/14/22 4313    trimethobenzamide (TIGAN) injection 200 mg  200 mg IntraMUSCular Q6H PRN Isidro Hric, DO   200 mg at 10/12/22 2057    sodium chloride flush 0.9 % injection 5-40 mL  5-40 mL IntraVENous 2 times per day Katheran Pulse, APRN - NP   10 mL at 10/13/22 2127    sodium chloride flush 0.9 % injection 5-40 mL  5-40 mL IntraVENous PRN Katheran Pulse, APRN - NP   10 mL at 10/11/22 1647    0.9 % sodium chloride infusion   IntraVENous PRN Katheran Pulse, APRN - NP        polyethylene glycol (GLYCOLAX) packet 17 g  17 g Oral Daily PRN Katheran Pulse, APRN - NP        acetaminophen (TYLENOL) tablet 650 mg  650 mg Oral Q6H PRN Katheran Pulse, APRN - NP   650 mg at 10/12/22 1557    Or    acetaminophen (TYLENOL) suppository 650 mg  650 mg Rectal Q6H PRN Katheran Pulse, APRN - NP        bumetanide (BUMEX) injection 1 mg  1 mg IntraVENous BID Katheran Pulse, APRN - NP   1 mg at 10/14/22 0919    apixaban (ELIQUIS) tablet 5 mg  5 mg Oral BID Bull Mccullough MD   5 mg at 10/14/22 0919    metoprolol succinate (TOPROL XL) extended release tablet 100 mg  100 mg Oral Daily Bull Mccullough MD   100 mg at 10/14/22 0919      sodium chloride         Physical Exam:  /74   Pulse 70   Temp 97.6 °F (36.4 °C) (Oral)   Resp 18   Ht 5' 10\" (1.778 m)   Wt 258 lb (117 kg)   SpO2 95%   BMI 37.02 kg/m²   Weight change: -1.6 oz (-0.045 kg)  Wt Readings from Last 3 Encounters:   10/14/22 258 lb (117 kg)   08/14/21 261 lb 6.4 oz (118.6 kg)   11/22/20 262 lb 2 oz (118.9 kg)     General: Awake, alert, oriented x3, no acute distress  Neck: No JVD, carotid bruits, thyromegaly, or lymphadenopathy  Cardiac: Regular rate and rhythm, normal S1 and split S2, no murmurs, no S3 or S4, no pericardial rubs.   Carotid upstrokes brisk  Resp: clear bilaterally without wheezes, rhonchi, or rales; unlabored respirations  Abdomen: soft, nontender, nondistended, BS+; no masses, bruits, or hepatomegaly  Extremities: no cyanosis, clubbing, ++ bilateral LE edema  Skin: Warm and dry, no rashes or lesions  Neuro: moves all extremities to command, no focal deficits noted    Intake/Output:  No intake or output data in the 24 hours ending 10/14/22 1059    No intake/output data recorded. Laboratory Tests:  Last 3 CBC:  Recent Labs     10/11/22  1400   WBC 6.9   RBC 4.36   HGB 14.5   HCT 43.3   MCV 99.3   MCH 33.3   MCHC 33.5   RDW 13.6      MPV 10.9       Last 3 CMP:    Recent Labs     10/11/22  1400 10/12/22  0250 10/13/22  0414 10/14/22  0246    140 139 136   K 3.9 3.1* 3.4* 3.9    98 99 97*   CO2 34* 33* 33* 30*   BUN 10 10 14 21   CREATININE 1.1 1.2 1.3* 1.3*   GLUCOSE 119* 101* 111* 167*   CALCIUM 9.9 9.3 8.5* 9.1   PROT 7.0  --   --   --    LABALBU 4.4  --   --   --    BILITOT 1.3*  --   --   --    ALKPHOS 81  --   --   --    AST 20  --   --   --    ALT 11  --   --   --        Last 3 Mag/Phos:  Recent Labs     10/12/22  0250 10/13/22  0414 10/14/22  0246   MG 1.2* 1.4* 1.8   PHOS 2.5  --   --        Last 3 CK, CKMB, Troponin  No results for input(s): CKTOTAL, CKMB, TROPONINI in the last 72 hours. Last 3 BNP:  No results for input(s): BNP in the last 72 hours. No results found for: BNP    Last 3 Glucose:     Recent Labs     10/12/22  0250 10/13/22  0414 10/14/22  0246   GLUCOSE 101* 111* 167*       Last 3 Coags:  No results for input(s): PROTIME, INR, PTT in the last 72 hours.   Lab Results   Component Value Date/Time    PROTIME 14.9 11/21/2020 04:58 PM    PROTIME 11.4 10/31/2010 10:45 AM    INR 1.3 11/21/2020 04:58 PM       Last 3 Lipid Panel:  Recent Labs     10/12/22  0250   LDLCALC 42   HDL 66   TRIG 55     Lab Results   Component Value Date    LDLCALC 42 10/12/2022    LDLCALC 59 09/04/2019     Lab Results   Component Value Date    HDL 66 10/12/2022    HDL 64 09/04/2019     Lab Results   Component Value Date    TRIG 55 10/12/2022    TRIG 119 09/04/2019     No components found for: CHLPL    TSH:  No results for input(s): TSH in the last 72 hours. Lab Results   Component Value Date/Time    TSH 0.391 11/21/2020 09:26 PM           Radiology:  XR CHEST PORTABLE   Final Result   Mild cardiomegaly with no acute infiltrate or effusion. ECHO Summary   Mild left ventricular concentric hypertrophy noted. Septal motion consistent with paced rhythm . Ejection fraction is visually estimated at 40%. distal inferior wall hypokinesis   Pacer wire visualized in right ventricle. Mildly dilated right ventricle. Normal right ventricle systolic function. The left atrium is moderately dilated. Mildly enlarged right atrium size. Mild mitral regurgitation is present. Mild tricuspid regurgitation. ASSESSMENT / PLAN:  1. 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 Had been on lisinopril and valsartan in the past - both listed on home list-seems to be off of it a lot, valsartan resumed at his last office visit About to baseline- ok with cardiology to go with med adjustments as made-entresto/spironolacone. Continue bumex and toprol as before. can probably cut his kdur 40 to once a day F/u office in a month  2          Afib with AV node ablation 2.5 years ago VVIR PPM followed in pacemaker clinic. 3          Anticoagulant therapy eliquis- no bleeding. 4          HTN- stable  5          CAD- stable  6          GINA- refuses therapy but certainly can contribute to HF exacerbations.    7 COVID- not hypoxic- per hospitalist          Bard Amber MD,  [facility] [place]  The [facility] [address] at [facility]    Electronically signed by Bard Amber MD on 10/14/2022 at 10:59 AM

## 2022-10-14 NOTE — DISCHARGE SUMMARY
[facility] Physician Discharge Summary       Juan Salcedo MD  [address]  [facility] [phone]    Schedule an appointment as soon as possible for a visit in 1 month(s)      Sandra Cohen DO  [address] [place] [id number] [phone]    Follow up  After discharge visit- med chnages      Activity level: As tolerated     Dispo:Home      Condition on discharge: Stable     Patient ID:  Filiberto Alva  [id number]  80 y.o.  1940    Admit date: 10/11/2022    Discharge date and time:  10/14/2022  12:40 PM    Admission Diagnoses: Principal Problem:    CHF (congestive heart failure), NYHA class I, acute on chronic, combined ([address]  Active Problems:    Acute respiratory failure with hypoxia (HCC)    Hypokalemia    Hypomagnesemia    Acute combined systolic and diastolic heart failure ([address]  Resolved Problems:    * No resolved hospital problems. 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 *      Consults:  IP CONSULT TO HEART FAILURE NURSE/COORDINATOR  IP CONSULT TO DIETITIAN  IP CONSULT TO CARDIOLOGY  IP CONSULT TO IV TEAM    Procedures: None    Hospital Course:   Presented to ED on 10/11 with c/o  SOB, chest pressure, & leg swelling for the last week. States it has progressed to the point that he came in today for further evaluation. Patient states he has been unable to lie in bed for the last week due to orthopnea, he has been sleeping in a recliner. Chest pain described as pressure located in left chest, does not radiate. States it is worse when he lies flat rated 8/10. Productive cough with clear sputum.   Patient reports 8 pound weight loss in the last 3 days. States weight fluctuates daily; unable to verbalize avg daily wt changes because it 'all over the board'. Ed work up as follows: elevated BNP (2/4/2004), elevated troponin (23, reduced from 8/2021). CXR identifies mild cardiomegaly without acute infiltrate or effusion. While in ER, pt received Bumex IV. He was admitted for further management. Acute on chronic HFrEF: BNP on admission was 2,404. Received IV Bumex in the ED. Continue Entresto, Toprol and Aldactone. Echo completed on 10/12 showing EF 40%, mild LVH, distal inferior wall hypokinesis, mildly dilated right ventricle, left atrium moderately dilated, mildly enlarged rigth atrium, mild MR and mild TR. Continued to diurese with IV Bumex BID and aldactone daily. Lasix was transitioned to oral. Started on Entresto. CHF nurse saw patient and educated - cardiology input appreciated  Acute hypoxic respiratory failure: Secondary to #1. Initially on 4L weaned  RA. Ambulatory pulse ox completed saturating 91% on RA while ambulating 95% on Ra at rest.  HTN: Continued Toprol XL and diuretics  CAD: Lipid panel was unremarkable. 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 Has not used since. Recommend sleep study outpatient. Hypokalemia: secondary to diuresis. Continued BID replacement. Transitioned to daily replace at discharge. Hypomagnesemia: has been supplemented. Mg 1.8 today. Atrial fibrillation: Hx coy ablation 2.5 years ago VVIR PPM. Follows with pacemaker clinic outpatient. Currently Vpaced. Continue Toprol XL. Diarrhea and sinus congestion: Started on zyrtec- patient states he takes at home. Respiratory viral panel positive for Covid- received 2 doses of decadron- weaned of oxygen. Sinus congestion and diarrhea has resolved     Patient is stable for discharge. He is to follow up with cardiology in one month.     Discharge Exam:    General Appearance: alert and oriented to person, place and time and in no acute distress  Skin: warm and dry  Head: normocephalic and atraumatic  Eyes: pupils equal, round, and reactive to light, extraocular eye movements intact, conjunctivae normal  Neck: neck supple and non tender without mass   Pulmonary/Chest: clear to auscultation bilaterally- no wheezes, rales or rhonchi, normal air movement, no respiratory distress  Cardiovascular: normal rate, normal S1 and S2 and no carotid bruits  Abdomen: soft, non-tender, non-distended, normal bowel sounds, no masses or organomegaly  Extremities: no cyanosis, no clubbing . +1 edema to bilat Le  Neurologic: no cranial nerve deficit and speech normal    No intake/output data recorded. No intake/output data recorded. LABS:  Recent Labs     10/12/22  0250 10/13/22  0414 10/14/22  0246    139 136   K 3.1* 3.4* 3.9   CL 98 99 97*   CO2 33* 33* 30*   BUN 10 14 21   CREATININE 1.2 1.3* 1.3*   GLUCOSE 101* 111* 167*   CALCIUM 9.3 8.5* 9.1       Recent Labs     10/11/22  1400   WBC 6.9   RBC 4.36   HGB 14.5   HCT 43.3   MCV 99.3   MCH 33.3   MCHC 33.5   RDW 13.6      MPV 10.9       No results for input(s): POCGLU in the last 72 hours. Imaging:  XR CHEST PORTABLE    Result Date: 10/11/2022  EXAMINATION: ONE XRAY VIEW OF THE CHEST 10/11/2022 1:58 pm COMPARISON: 12 August 2021 HISTORY: ORDERING SYSTEM PROVIDED HISTORY: sob TECHNOLOGIST PROVIDED HISTORY: Reason for exam:->sob FINDINGS: Single erect AP portable chest demonstrates increased markings with no focal alveolar infiltrates or effusions. There is a dual-chamber cardiac pacemaker in place with mild cardiomegaly. There is no evidence of a pneumothorax. There are bilateral reverse shoulder arthroplasties in place. Mild cardiomegaly with no acute infiltrate or effusion.        Patient Instructions:      Medication List        START taking these medications      potassium chloride 20 MEQ extended release tablet  Commonly known as: KLOR-CON M  Take 2 tablets by mouth daily  Start taking on: October 15, 2022     sacubitril-valsartan 49-51 MG per tablet  Commonly known as: ENTRESTO  Take 1 tablet by mouth 2 times daily            CONTINUE taking these medications      bumetanide 1 MG tablet  Commonly known as: BUMEX  Take 1 tablet by mouth 2 times daily     cetirizine 10 MG tablet  Commonly known as: ZYRTEC     Eliquis 5 MG Tabs tablet  Generic drug: apixaban     guaiFENesin 400 MG tablet  Take 1 tablet by mouth 2 times daily as needed for Cough     metoprolol succinate 100 MG extended release tablet  Commonly known as: TOPROL XL  Take 1 tablet by mouth daily  Start taking on: October 15, 2022     spironolactone 25 MG tablet  Commonly known as: ALDACTONE  Take 1 tablet by mouth daily     vitamin C 500 MG tablet  Commonly known as: ASCORBIC ACID  Take 2 tablets by mouth daily for 10 days            STOP taking these medications      acetaminophen 500 MG tablet  Commonly known as: TYLENOL     ibuprofen 400 MG tablet  Commonly known as: IBU     lisinopril 20 MG tablet  Commonly known as: PRINIVIL;ZESTRIL     valsartan 80 MG tablet  Commonly known as: DIOVAN     vitamin B-12 1000 MCG tablet  Commonly known as: CYANOCOBALAMIN               Where to Get Your Medications        These medications were sent to [facility] - [address] [place] [address] - P [phone] [place] [phone]  [address] [place] [address]      Phone: [phone]   metoprolol succinate 100 MG extended release tablet  potassium chloride 20 MEQ extended release tablet  sacubitril-valsartan 49-51 MG per tablet  spironolactone 25 MG tablet         In review of EMR, valuation, management, and diagnosis. Discharge plan has been discussed with attending.   Time spent 45 minutes      Signed:  Electronically signed by MANUEL Pereira CNP on 10/14/2022 at 12:40 PM

## 2022-10-14 NOTE — ACP (ADVANCE CARE PLANNING)
Advance Care Planning     Advance Care Planning Activator (Inpatient)  Conversation Note      Date of ACP Conversation: 10/14/2022     Conversation Conducted with: Patient with Decision Making Capacity    ACP Activator: Marcos Brown, [facility] Maker:     Current Designated Health Care Decision Maker:     Primary Decision Maker: Adam Moseley - Child - [phone]    Secondary Decision Maker: Millicent Land - Domestic Partner - [phone]  Click here to complete [address] including section of the [facility] Relationship (ie \"Primary\")  Today we documented Decision Maker(s) consistent with Legal Next of Kin hierarchy. Care Preferences    Ventilation: \"If you were in your present state of health and suddenly became very ill and were unable to breathe on your own, what would your preference be about the use of a ventilator (breathing machine) if it were available to you? \"      Would the patient desire the use of ventilator (breathing machine)?: yes    \"If your health worsens and it becomes clear that your chance of recovery is unlikely, what would your preference be about the use of a ventilator (breathing machine) if it were available to you? \"     Would the patient desire the use of ventilator (breathing machine)?: Yes      Resuscitation  \"CPR works best to restart the heart when there is a sudden event, like a heart attack, in someone who is otherwise healthy. Unfortunately, CPR does not typically restart the heart for people who have serious health conditions or who are very sick. \"    \"In the event your heart stopped as a result of an underlying serious health condition, would you want attempts to be made to restart your heart (answer \"yes\" for attempt to resuscitate) or would you prefer a natural death (answer \"no\" for do not attempt to resuscitate)? \" yes       [] Yes   [] No   Educated Patient / Scott Ped regarding differences between Advance Directives and portable DNR orders.     Length of ACP Conversation in minutes:      Conversation Outcomes:  [x] ACP discussion completed  [] Existing advance directive reviewed with patient; no changes to patient's previously recorded wishes  [] New Advance Directive completed  [] Portable Do Not Rescitate prepared for Provider review and signature  [] POLST/POST/MOLST/MOST prepared for Provider review and signature      Follow-up plan:    [] Schedule follow-up conversation to continue planning  [] Referred individual to Provider for additional questions/concerns   [] Advised patient/agent/surrogate to review completed ACP document and update if needed with changes in condition, patient preferences or care setting    [] This note routed to one or more involved healthcare providers

## 2022-10-14 NOTE — CONSULTS
CHF NURSE NAVIGATOR CONSULT NOTE:      Patient currently admitted with diagnosis of Diastolic/systolic heart failure. Patient was awake and alert during the consultation. He was engaged and asked appropriate questions throughout the education session. Scheduling with the CHF clinic Yes. Future Appointments   Date Time Provider Lin Choi   10/25/2022  8:00 AM [facility] ROOM 1 [facility] [place] HOD       Barriers to Care:  Contributing risk factors for Heart Failure are identified as none. The patient is ordered:  Diet: ADULT DIET; Regular; Low Sodium (2 gm)   Sodium controlled diet Yes  Fluid restriction daily ordered (fluid restriction recommended if patient is hyponatremic and/or diuretic is initiated or increased) No  FR:   Daily Weights: Patient Vitals for the past 96 hrs (Last 3 readings):   Weight   10/14/22 0025 258 lb (117 kg)   10/13/22 0038 258 lb 1.6 oz (117.1 kg)   10/12/22 0448 261 lb 6.4 oz (118.6 kg)     I/O: No intake or output data in the 24 hours ending 10/14/22 1348           We reviewed the introduction to Heart Failure, the HF zones, signs and symptoms to report on day 1 of onset, medications, medication compliance, the importance of obtaining daily weights, following a low sodium diet, reading food labels for the sodium content, keeping physician appointments, and smoking cessation. We discussed writing / tracking daily weights on a calendar / log because a 5 pound gain in 1 week can sneak up if you are not tracking it. I advised patient they can reduce the risk for Heart Failure exacerbations by modifying / controlling the risk factors.  We discussed self-managed care which includes the following:  to take medications as prescribed, report any intolerable side effects of medications to the cardiologist / doctor, do not just stop taking the medication; follow a cardiac heart healthy / low sodium diet; weigh yourself daily, exercise regularly- per doctor recommendation and not to smoke or use an excess amount of alcohol. We discussed calling the cardiologist / doctor with a weight gain of 3 pounds in one day or a total of 5 pounds or more in one week. Also, if you should have a significant weight loss of 3# or more in one day to call the doctor, they may need to decrease or hold the diuretic dose. On days you feels nauseated and not eating / drinking, having emesis or diarrhea,  informed to call the cardiologist  / doctor, they may need to decrease or hold diuretic to avoid dehydration. I stressed the importance of informing their cardiologist the first day of onset of any of the signs and symptoms in the \"Yellow Zone\" of the HF Zones. Patient verbalizes understanding. Greater than 30 minutes was spent educating patient. The Heart Failure Booklet given to the patient with additional patient education addressing:  What is Heart Failure? Things You Can Do to Live Well with HF  How to Take Your Medications  How to Eat Less Salt  [place] its Salt? Exercising Well with Heart Failure  Signs and symptoms of HF to report  Weight Yourself Each Day  Home Self Management- activity, weight tracking, taking medications as prescribed, meals /diet planning (sodium and fluid restriction), how to read food labels, keeping physician follow ups, smoking cessation, follow the Heart Failure Zones  The Heart Failure zones  Every Dose Every Day      Instructed  to call 911 if you have any of the following symptoms:       Struggling to breathe unrelieved with rest,     Having chest pain     Have confusion or cant think clearly          Gayathri Adams, RN BSN, RN  Heart Failure [address] [place] [address],[address] (CHF) AHA GUIDELINES  (Must be completed for Primary Diagnosis CHF or History of CHF)    Discharge Plan:  I placed the Heart Failure Home Instructions in patient's discharge instructions.    Per Heart Failure GWTG, the patient should have a follow-up appointment made within 7 days of discharge. New Diagnosis No    ECHO Results most recent:  Lab Results   Component Value Date    LVEF 35 2019                                        Social History     Tobacco Use   Smoking Status Former    Packs/day: 3.00    Years: 15.00    Pack years: 45.00    Types: Cigarettes    Quit date: 1969    Years since quittin.8   Smokeless Tobacco Never          There is no immunization history on file for this patient.        Angiotensin-Converting-Enzyme (ACE) inhibitor ordered:  [] Yes  [x] No (specify contraindication):    [] Contraindicated  [] Hypotensive patient who was at immediate risk of cardiogenic shock  [] Hospitalized patient who experienced marked azotemia  [] Other Contraindications  [] Not Eligible  [] Not Tolerant  [] Patient Reason  [] System Reason  [] Other Reason    Angiotensin II receptor blockers (ARB) ordered:  [] Yes  [x] No (specify contraindication):    [] Contraindicated  [] Hypotensive patient who was at immediate risk of cardiogenic shock  [] Hospitalized patient who experienced marked azotemia  [] Other Contraindications    ARNI - Angiotensin Receptor Neprilysin Inhibitor ordered:  [x] Yes  [] No (specify contraindication):    [] ACE inhibitor use within the prior 36 hours  [] Allergy  [] Hyperkalemia  [] Hypotension  [] Renal dysfunction defined as creatinine > 2.5 mg/dL in men or > 2.0 mg/dL in women  [] Other Contraindications  [] Not Eligible  [] Not Tolerant  [] Patient Reason  []System Reason  []Other Reason      Beta Blocker ordered:    [x] Yes  [] No (specify contraindication):    [] Contraindicated  [] Asthma  [] Fluid Overload  [] Low Blood Pressure  [] Patient recently treated with an intravenous positive inotropic agent  [] Other Contraindications  [] Not Eligible  [] Not Tolerant  [] Patient Reason  [] System Reason    SGLT2 Inhibitor ordered:  [] Yes  [x] No (specify contraindication):    [] Contraindicated  [] Patient currently on dialysis  [] Ketoacidosis  [] Known hypersensitivity to the medication  [] Type I diabetes (not approved for use in patients with Type I diabetes due to increased risk of ketoacidosis)  [] Other Contraindications  [] Not Eligible  [] Not Tolerant  [] Patient Reason  [] System Reason  [] Other Reason    Aldosterone Antagonist ordered:  [x] Yes  [] No (specify contraindication):    [] Contraindicated  [] Allergy due to aldosterone receptor antagonist  [] Hyperkalemia  [] Renal dysfunction defined as creatinine >2.5 mg/dL in men or >2.0 mg/dL in women.   [] Other contraindications  [] Not Eligible  [] Not Tolerant  [] Patient Reason  [] System Reason  [] Other Reason

## 2022-10-14 NOTE — CARE COORDINATION
Social work / Discharge Planning:         [address] Discharge plan is home. Patient will drive himself home. He is on room air. No discharge needs identified at this time.     Electronically signed by JENNIFER Alves on 10/14/2022 at 8:59 AM

## 2022-10-14 NOTE — PROGRESS NOTES
[facility] Addendum    I have participated in the history, exam, medical decision making with Rojas Lewis NP on the date of service. I have also reviewed available labs, imaging / microbiologic / cardiac studies as well as the input of any consultants involved in this case. 80 y.o. male w PMH Afib on Eliquis, HTN, GINA, and obesity who presents to ED 10/11 from home with complaints of SOB, chest pain, and leg swelling. Began ~1 week ago and worsening. Pt sleeping in chair as he could not lie flat. He does note he cannot comment on a baseline weight as it varies. He actually also reported not being on a home diuretic. Home med list being verified. Initial evaluation in the ED showed pt was satting 80s on room air but otherwise hemodynamically stable. Workup showed BNP 2400 and slight hyperbilirubinemia but really was otherwise fairly unremarkable. Imaging studies included CXR, which showed cardiomegaly. EKG nonischemic. Pt was given Bumex in the ED and admitted for further evaluation and treatment with consult requests to cardiology. He told cardio he is up ~10 lbs. Cardio feels largely due to [employer] consisting mostly of fast food. Have made medication adjustments and feel pt stable for dc.       Objective  Physical Exam  Vitals: /74   Pulse 70   Temp 97.6 °F (36.4 °C) (Oral)   Resp 18   Ht 5' 10\" (1.778 m)   Wt 258 lb (117 kg)   SpO2 95%   BMI 37.02 kg/m²   General: well-developed, well-nourished, no acute distress, cooperative  Skin: generally warm, dry, and intact, with normal color  HEENT: normocephalic, atraumatic, no gross abnormalities  Respiratory: clear to auscultation bilaterally without respiratory distress  Cardiovascular: regular rate and rhythm without murmur / rub / gallop  Abdominal: soft, nontender, nondistended, normoactive bowel sounds  Extremities: no obvious edema or deformity  Neurologic: awake, alert, no gross deficits  Psychiatric: normal affect, cooperative    Echo results 9/4/2019  Moderately severe left ventricular concentric hypertrophy noted. Ejection fraction is visually estimated at 35 +/- 5%. Septal motion consistent with paced rhythm . Indeterminate diastolic function. The left atrium is moderately dilated. Normal right ventricular size and function. Right ventricle pacemaker lead noted. Moderate mitral regurgitation is present. Mild tricuspid regurgitation. RVSP is 33 mmHg. Echo results 10/12/22  Mild left ventricular concentric hypertrophy noted. Septal motion consistent with paced rhythm . Ejection fraction is visually estimated at 40%. distal inferior wall hypokinesis   Pacer wire visualized in right ventricle. Mildly dilated right ventricle. Normal right ventricle systolic function. The left atrium is moderately dilated. Mildly enlarged right atrium size. Mild mitral regurgitation is present. Mild tricuspid regurgitation. Assessment  Acute hypoxia due to acute-on-chronic HFrEF  Mild hyperbilirubinemia noted incidentally  Hx Afib on Eliquis, HTN, GINA, and obesity    Plan  O2 as needed, wean as able, on 4L in ED, now on room air  Diuretics transitioned to PO Bumex 1 mg bid and continuing K supplement  Repeat echo as above w EF 40%; no Life Vest  Continue Aldactone; ARB switched to Entresto and continue  Cardio input appreciated - adjusting meds and attempting to get records from [place]    Otherwise for dc as per NP's note. Please see orders for further plan of care. Time spent on chart review, clinical exam, discussing case and answering questions with staff, consultants, patient, and family was approximately 25 minutes.     Electronically signed by Manan Mcgill DO on 10/14/2022 at 8:56 AM

## 2022-10-17 ENCOUNTER — CARE COORDINATION (OUTPATIENT)
Dept: CARE COORDINATION | Age: 82
End: 2022-10-17

## 2022-10-17 LAB — ZINC: 53.9 UG/DL (ref 60–120)

## 2022-10-17 ASSESSMENT — EJECTION FRACTION
EF_SOURCE: 2D ECHO
EF_VALUE: 40%

## 2022-10-17 NOTE — CARE COORDINATION
[facility] Care Transitions Initial Follow Up Call    Call within 2 business days of discharge: Yes    Care Transition Nurse contacted the patient by telephone to perform post hospital discharge assessment. Verified name and  with patient as identifiers. Provided introduction to self, and explanation of the Care Transition Nurse role. Patient: Vincent Hunter Patient : 1940   MRN: [id number]  Reason for Admission: [facility] 10/11-10/14/2022 CHF  Discharge Date: 10/14/22 RARS: Readmission Risk Score: 11.3      Last Discharge  Street       Date Complaint Diagnosis Description Type Department Provider    10/11/22 Chest Pain; Shortness of Breath; Leg Swelling Acute on chronic congestive heart failure, unspecified heart failure type ([facility] [address] . .. ED to Hosp-Admission (Discharged) (ADMITTED) [address] DO; Pauline Raza. .. Was this an external facility discharge? No Discharge Facility: [facility]    Challenges to be reviewed by the provider   Additional needs identified to be addressed with provider: No  none               Method of communication with provider: none. Spoke with pt, states he is fair but feels getting better. Denies sob, and chest pain, no fever or covid related s/s. States his legs are swollen but they are getting better. States he is weighing self daily and has lost 4lbs since hospital d/c. States he does have a slight cough but is improving. Reviewed meds and pt is taking all per order. Reviewed chf zone tool. Pt is eating and drinking normal no salt in diet. Pt is able to move around on own using a cane. Bowel and bladder normal. [facility]. Denies home, med or transportation needs ctn info given and will follow up with pt next week     Care Transition Nurse reviewed discharge instructions with patient who verbalized understanding. The patient was given an opportunity to ask questions and does not have any further questions or concerns at this time.  Were discharge instructions available to patient? Yes. Reviewed appropriate site of care based on symptoms and resources available to patient including: PCP  When to call 911. The patient agrees to contact the PCP office for questions related to their healthcare. Advance Care Planning:   Does patient have an Advance Directive:  NOT REVIEWED . Medication reconciliation was performed with patient, who verbalizes understanding of administration of home medications. Medications reviewed    Was patient discharged with a pulse oximeter? no    Non-face-to-face services provided:  Obtained and reviewed discharge summary and/or continuity of care documents    Offered patient enrollment in the Remote Patient Monitoring (RPM) program for in-home monitoring: NA.    Care Transitions 24 Hour Call    Do you have a copy of your discharge instructions?: Yes  Do you have all of your prescriptions and are they filled?: Yes  Have you been contacted by a [address]?: No  Have you scheduled your follow up appointment?: Yes  How are you going to get to your appointment?: Car - family or friend to transport  Patient DME: Straight cane, Shower chair, Walker  Do you have support at home?: Partner/Spouse/SO  Do you feel like you have everything you need to keep you well at home?: Yes  Care Transitions Interventions         Follow Up  Future Appointments   Date Time Provider Lin Choi   10/25/2022  8:00 AM [facility] ROOM 1 [facility] [address] Transition Nurse provided contact information. Plan for follow-up call in 5-7 days based on severity of symptoms and risk factors.   Plan for next call: symptom management-DAILY WEIGHT, EDEMA, SOB, CP, COVID S/S  follow-up appointment-PCP, BRUCE Hutchison LPN

## 2022-10-25 ENCOUNTER — HOSPITAL ENCOUNTER (OUTPATIENT)
Dept: OTHER | Age: 82
Setting detail: THERAPIES SERIES
Discharge: HOME | End: 2022-10-25

## 2022-10-26 ENCOUNTER — CARE COORDINATION (OUTPATIENT)
Dept: CARE COORDINATION | Age: 82
End: 2022-10-26

## 2022-10-26 NOTE — CARE COORDINATION
Care Transitions Outreach Attempt    Call within 2 business days of discharge: Yes   Attempted to reach patient for transitions of care follow up. Unable to reach patient. Left hipaa compliant message requesting call back. Will attempt again at a later time and date     Patient: Margo Garcia Patient : 1940 MRN: [id number]    Last Discharge [address]       Date Complaint Diagnosis Description Type Department Provider    10/11/22 Chest Pain; Shortness of Breath; Leg Swelling Acute on chronic congestive heart failure, unspecified heart failure type ([facility] [address] . .. ED to Hosp-Admission (Discharged) (ADMITTED) [address] DO; Kika Harvey. .. Was this an external facility discharge?  No Discharge Facility: [facility] 10/11-10/14/2022 CHF    Noted following upcoming appointments from discharge chart review:   [facility] follow up appointment(s):   Future Appointments   Date Time Provider Lin Choi   11/3/2022  8:00 AM [facility] ROOM 1 [facility] [place] HOD     [facility] follow up appointment(s): victoriano

## 2022-11-02 NOTE — PROGRESS NOTES
Called patient to inform him of his chf clinic appointment tomorrow and he stated \"I can't come in tomorrow since I have a primary care appointment at the same time and I really don't want to come to the CHF clinic. \" He stated he is going to talk to his family doctor tomorrow and will call us if he wants to be an established patient.

## 2022-11-03 ENCOUNTER — HOSPITAL ENCOUNTER (OUTPATIENT)
Dept: OTHER | Age: 82
Setting detail: THERAPIES SERIES
Discharge: HOME | End: 2022-11-03

## 2022-11-03 ENCOUNTER — CARE COORDINATION (OUTPATIENT)
Dept: CARE COORDINATION | Age: 82
End: 2022-11-03

## 2022-11-03 NOTE — CARE COORDINATION
Care Transitions Outreach Attempt    Call within 2 business days of discharge: Yes   Attempted to reach patient for transitions of care follow up. Unable to reach patient. Left hipaa compliant message updating pt to follow up with pcp regarding anything further. Ctn info given and will sign off     Patient: Lindsey Cheng Patient : 1940 MRN: [id number]    Last Discharge [address]       Date Complaint Diagnosis Description Type Department Provider    10/11/22 Chest Pain; Shortness of Breath; Leg Swelling Acute on chronic congestive heart failure, unspecified heart failure type ([facility] [address] . .. ED to Hosp-Admission (Discharged) (ADMITTED) [address], DO; Elder Clamp. .. Was this an external facility discharge? No Discharge Facility: [facility] 10/11-10/14/2022 CHF    Noted following upcoming appointments from discharge chart review:   [facility] follow up appointment(s): No future appointments.   [facility] follow up appointment(s): na

## 2022-11-04 ENCOUNTER — HOSPITAL ENCOUNTER (INPATIENT)
Age: 82
LOS: 4 days | Discharge: HOME | DRG: 683 | End: 2022-11-08
Attending: EMERGENCY MEDICINE | Admitting: STUDENT IN AN ORGANIZED HEALTH CARE EDUCATION/TRAINING PROGRAM
Payer: OTHER GOVERNMENT

## 2022-11-04 ENCOUNTER — APPOINTMENT (OUTPATIENT)
Dept: GENERAL RADIOLOGY | Age: 82
DRG: 683 | End: 2022-11-04
Payer: OTHER GOVERNMENT

## 2022-11-04 ENCOUNTER — TELEPHONE (OUTPATIENT)
Dept: OTHER | Facility: CLINIC | Age: 82
End: 2022-11-04

## 2022-11-04 DIAGNOSIS — E87.5 HYPERKALEMIA: ICD-10-CM

## 2022-11-04 DIAGNOSIS — R07.89 OTHER CHEST PAIN: Primary | ICD-10-CM

## 2022-11-04 PROBLEM — R53.83 FATIGUE: Status: ACTIVE | Noted: 2022-11-04

## 2022-11-04 PROBLEM — N17.9 AKI (ACUTE KIDNEY INJURY) (HCC): Status: ACTIVE | Noted: 2022-11-04

## 2022-11-04 LAB
ALBUMIN SERPL-MCNC: 3.9 G/DL (ref 3.5–5.2)
ALP BLD-CCNC: 99 U/L (ref 40–129)
ALT SERPL-CCNC: 22 U/L (ref 0–40)
ANION GAP SERPL CALCULATED.3IONS-SCNC: 10 MMOL/L (ref 7–16)
ANION GAP SERPL CALCULATED.3IONS-SCNC: 11 MMOL/L (ref 7–16)
AST SERPL-CCNC: 22 U/L (ref 0–39)
BACTERIA: ABNORMAL /HPF
BASOPHILS ABSOLUTE: 0.02 E9/L (ref 0–0.2)
BASOPHILS RELATIVE PERCENT: 0.3 % (ref 0–2)
BILIRUB SERPL-MCNC: 0.7 MG/DL (ref 0–1.2)
BILIRUBIN URINE: NEGATIVE
BLOOD, URINE: NEGATIVE
BUN BLDV-MCNC: 32 MG/DL (ref 6–23)
BUN BLDV-MCNC: 32 MG/DL (ref 6–23)
CALCIUM SERPL-MCNC: 9.6 MG/DL (ref 8.6–10.2)
CALCIUM SERPL-MCNC: 9.6 MG/DL (ref 8.6–10.2)
CHLORIDE BLD-SCNC: 103 MMOL/L (ref 98–107)
CHLORIDE BLD-SCNC: 106 MMOL/L (ref 98–107)
CLARITY: CLEAR
CO2: 19 MMOL/L (ref 22–29)
CO2: 20 MMOL/L (ref 22–29)
COLOR: YELLOW
CREAT SERPL-MCNC: 1.7 MG/DL (ref 0.7–1.2)
CREAT SERPL-MCNC: 1.7 MG/DL (ref 0.7–1.2)
EOSINOPHILS ABSOLUTE: 0.12 E9/L (ref 0.05–0.5)
EOSINOPHILS RELATIVE PERCENT: 1.5 % (ref 0–6)
EPITHELIAL CELLS, UA: ABNORMAL /HPF
GFR SERPL CREATININE-BSD FRML MDRD: 40 ML/MIN/1.73
GFR SERPL CREATININE-BSD FRML MDRD: 40 ML/MIN/1.73
GLUCOSE BLD-MCNC: 100 MG/DL (ref 74–99)
GLUCOSE BLD-MCNC: 199 MG/DL (ref 74–99)
GLUCOSE URINE: NEGATIVE MG/DL
HCT VFR BLD CALC: 44.3 % (ref 37–54)
HEMOGLOBIN: 14.3 G/DL (ref 12.5–16.5)
IMMATURE GRANULOCYTES #: 0.03 E9/L
IMMATURE GRANULOCYTES %: 0.4 % (ref 0–5)
INR BLD: 1.2
KETONES, URINE: NEGATIVE MG/DL
LEUKOCYTE ESTERASE, URINE: NEGATIVE
LYMPHOCYTES ABSOLUTE: 1.25 E9/L (ref 1.5–4)
LYMPHOCYTES RELATIVE PERCENT: 16.1 % (ref 20–42)
MAGNESIUM: 1.7 MG/DL (ref 1.6–2.6)
MCH RBC QN AUTO: 32 PG (ref 26–35)
MCHC RBC AUTO-ENTMCNC: 32.3 % (ref 32–34.5)
MCV RBC AUTO: 99.1 FL (ref 80–99.9)
METER GLUCOSE: 171 MG/DL (ref 74–99)
MONOCYTES ABSOLUTE: 0.56 E9/L (ref 0.1–0.95)
MONOCYTES RELATIVE PERCENT: 7.2 % (ref 2–12)
MUCUS: PRESENT /LPF
NEUTROPHILS ABSOLUTE: 5.79 E9/L (ref 1.8–7.3)
NEUTROPHILS RELATIVE PERCENT: 74.5 % (ref 43–80)
NITRITE, URINE: NEGATIVE
PDW BLD-RTO: 13.2 FL (ref 11.5–15)
PH UA: 5.5 (ref 5–9)
PLATELET # BLD: 240 E9/L (ref 130–450)
PMV BLD AUTO: 11.3 FL (ref 7–12)
POTASSIUM SERPL-SCNC: 5.7 MMOL/L (ref 3.5–5)
POTASSIUM SERPL-SCNC: 6.5 MMOL/L (ref 3.5–5)
PROTEIN UA: NORMAL MG/DL
PROTHROMBIN TIME: 13.7 SEC (ref 9.3–12.4)
RBC # BLD: 4.47 E12/L (ref 3.8–5.8)
RBC UA: ABNORMAL /HPF (ref 0–2)
SODIUM BLD-SCNC: 133 MMOL/L (ref 132–146)
SODIUM BLD-SCNC: 136 MMOL/L (ref 132–146)
SPECIFIC GRAVITY UA: >=1.03 (ref 1–1.03)
TOTAL PROTEIN: 7.1 G/DL (ref 6.4–8.3)
TROPONIN, HIGH SENSITIVITY: 31 NG/L (ref 0–11)
TROPONIN, HIGH SENSITIVITY: 34 NG/L (ref 0–11)
UROBILINOGEN, URINE: 0.2 E.U./DL
WBC # BLD: 7.8 E9/L (ref 4.5–11.5)
WBC UA: ABNORMAL /HPF (ref 0–5)

## 2022-11-04 PROCEDURE — 85025 COMPLETE CBC W/AUTO DIFF WBC: CPT

## 2022-11-04 PROCEDURE — 6360000002 HC RX W HCPCS

## 2022-11-04 PROCEDURE — 84484 ASSAY OF TROPONIN QUANT: CPT

## 2022-11-04 PROCEDURE — 82962 GLUCOSE BLOOD TEST: CPT

## 2022-11-04 PROCEDURE — 6370000000 HC RX 637 (ALT 250 FOR IP): Performed by: EMERGENCY MEDICINE

## 2022-11-04 PROCEDURE — 96374 THER/PROPH/DIAG INJ IV PUSH: CPT

## 2022-11-04 PROCEDURE — 83735 ASSAY OF MAGNESIUM: CPT

## 2022-11-04 PROCEDURE — 71045 X-RAY EXAM CHEST 1 VIEW: CPT

## 2022-11-04 PROCEDURE — 99285 EMERGENCY DEPT VISIT HI MDM: CPT

## 2022-11-04 PROCEDURE — 2060000000 HC ICU INTERMEDIATE R&B

## 2022-11-04 PROCEDURE — 85610 PROTHROMBIN TIME: CPT

## 2022-11-04 PROCEDURE — 80048 BASIC METABOLIC PNL TOTAL CA: CPT

## 2022-11-04 PROCEDURE — 93005 ELECTROCARDIOGRAM TRACING: CPT

## 2022-11-04 PROCEDURE — 2500000003 HC RX 250 WO HCPCS

## 2022-11-04 PROCEDURE — 81001 URINALYSIS AUTO W/SCOPE: CPT

## 2022-11-04 PROCEDURE — 6360000002 HC RX W HCPCS: Performed by: EMERGENCY MEDICINE

## 2022-11-04 PROCEDURE — 80053 COMPREHEN METABOLIC PANEL: CPT

## 2022-11-04 PROCEDURE — 36415 COLL VENOUS BLD VENIPUNCTURE: CPT

## 2022-11-04 PROCEDURE — 2580000003 HC RX 258

## 2022-11-04 PROCEDURE — 6370000000 HC RX 637 (ALT 250 FOR IP)

## 2022-11-04 PROCEDURE — 99222 1ST HOSP IP/OBS MODERATE 55: CPT | Performed by: STUDENT IN AN ORGANIZED HEALTH CARE EDUCATION/TRAINING PROGRAM

## 2022-11-04 PROCEDURE — 96375 TX/PRO/DX INJ NEW DRUG ADDON: CPT

## 2022-11-04 RX ORDER — CETIRIZINE HYDROCHLORIDE 10 MG/1
10 TABLET ORAL DAILY
Status: DISCONTINUED | OUTPATIENT
Start: 2022-11-05 | End: 2022-11-08 | Stop reason: HOSPADM

## 2022-11-04 RX ORDER — SODIUM CHLORIDE 0.9 % (FLUSH) 0.9 %
5-40 SYRINGE (ML) INJECTION EVERY 12 HOURS SCHEDULED
Status: DISCONTINUED | OUTPATIENT
Start: 2022-11-04 | End: 2022-11-08 | Stop reason: HOSPADM

## 2022-11-04 RX ORDER — ALBUTEROL SULFATE 2.5 MG/3ML
2.5 SOLUTION RESPIRATORY (INHALATION) ONCE
Status: COMPLETED | OUTPATIENT
Start: 2022-11-04 | End: 2022-11-04

## 2022-11-04 RX ORDER — ASPIRIN 81 MG/1
TABLET, CHEWABLE ORAL
Status: DISCONTINUED
Start: 2022-11-04 | End: 2022-11-04

## 2022-11-04 RX ORDER — ONDANSETRON 2 MG/ML
4 INJECTION INTRAMUSCULAR; INTRAVENOUS EVERY 6 HOURS PRN
Status: DISCONTINUED | OUTPATIENT
Start: 2022-11-04 | End: 2022-11-04

## 2022-11-04 RX ORDER — DEXTROSE MONOHYDRATE 25 G/50ML
25 INJECTION, SOLUTION INTRAVENOUS ONCE
Status: COMPLETED | OUTPATIENT
Start: 2022-11-04 | End: 2022-11-04

## 2022-11-04 RX ORDER — DEXTROSE MONOHYDRATE 25 G/50ML
25 INJECTION, SOLUTION INTRAVENOUS PRN
Status: DISCONTINUED | OUTPATIENT
Start: 2022-11-04 | End: 2022-11-04

## 2022-11-04 RX ORDER — CALCIUM GLUCONATE 94 MG/ML
1000 INJECTION, SOLUTION INTRAVENOUS ONCE
Status: COMPLETED | OUTPATIENT
Start: 2022-11-04 | End: 2022-11-04

## 2022-11-04 RX ORDER — ONDANSETRON 4 MG/1
4 TABLET, ORALLY DISINTEGRATING ORAL EVERY 8 HOURS PRN
Status: DISCONTINUED | OUTPATIENT
Start: 2022-11-04 | End: 2022-11-04

## 2022-11-04 RX ORDER — SODIUM CHLORIDE 9 MG/ML
INJECTION, SOLUTION INTRAVENOUS PRN
Status: DISCONTINUED | OUTPATIENT
Start: 2022-11-04 | End: 2022-11-08 | Stop reason: HOSPADM

## 2022-11-04 RX ORDER — DEXTROSE MONOHYDRATE 25 G/50ML
25 INJECTION, SOLUTION INTRAVENOUS PRN
Status: DISCONTINUED | OUTPATIENT
Start: 2022-11-04 | End: 2022-11-04 | Stop reason: CLARIF

## 2022-11-04 RX ORDER — DEXTROSE MONOHYDRATE 50 MG/ML
INJECTION, SOLUTION INTRAVENOUS
Status: COMPLETED
Start: 2022-11-04 | End: 2022-11-04

## 2022-11-04 RX ORDER — SODIUM CHLORIDE 0.9 % (FLUSH) 0.9 %
5-40 SYRINGE (ML) INJECTION PRN
Status: DISCONTINUED | OUTPATIENT
Start: 2022-11-04 | End: 2022-11-08 | Stop reason: HOSPADM

## 2022-11-04 RX ORDER — ONDANSETRON 2 MG/ML
4 INJECTION INTRAMUSCULAR; INTRAVENOUS ONCE
Status: COMPLETED | OUTPATIENT
Start: 2022-11-04 | End: 2022-11-04

## 2022-11-04 RX ORDER — POLYETHYLENE GLYCOL 3350 17 G/17G
17 POWDER, FOR SOLUTION ORAL DAILY PRN
Status: DISCONTINUED | OUTPATIENT
Start: 2022-11-04 | End: 2022-11-08 | Stop reason: HOSPADM

## 2022-11-04 RX ORDER — ACETAMINOPHEN 650 MG/1
650 SUPPOSITORY RECTAL EVERY 6 HOURS PRN
Status: DISCONTINUED | OUTPATIENT
Start: 2022-11-04 | End: 2022-11-08 | Stop reason: HOSPADM

## 2022-11-04 RX ORDER — DEXTROSE MONOHYDRATE 25 G/50ML
25 INJECTION, SOLUTION INTRAVENOUS ONCE
Status: DISCONTINUED | OUTPATIENT
Start: 2022-11-04 | End: 2022-11-04 | Stop reason: CLARIF

## 2022-11-04 RX ORDER — ASPIRIN 81 MG/1
324 TABLET, CHEWABLE ORAL ONCE
Status: COMPLETED | OUTPATIENT
Start: 2022-11-04 | End: 2022-11-04

## 2022-11-04 RX ORDER — FENTANYL CITRATE 50 UG/ML
50 INJECTION, SOLUTION INTRAMUSCULAR; INTRAVENOUS ONCE
Status: COMPLETED | OUTPATIENT
Start: 2022-11-04 | End: 2022-11-04

## 2022-11-04 RX ORDER — GUAIFENESIN 400 MG/1
400 TABLET ORAL 2 TIMES DAILY PRN
Status: DISCONTINUED | OUTPATIENT
Start: 2022-11-04 | End: 2022-11-08 | Stop reason: HOSPADM

## 2022-11-04 RX ORDER — METOPROLOL SUCCINATE 100 MG/1
100 TABLET, EXTENDED RELEASE ORAL DAILY
Status: DISCONTINUED | OUTPATIENT
Start: 2022-11-05 | End: 2022-11-08 | Stop reason: HOSPADM

## 2022-11-04 RX ORDER — SODIUM CHLORIDE 9 MG/ML
INJECTION, SOLUTION INTRAVENOUS CONTINUOUS
Status: ACTIVE | OUTPATIENT
Start: 2022-11-04 | End: 2022-11-05

## 2022-11-04 RX ORDER — ACETAMINOPHEN 325 MG/1
650 TABLET ORAL EVERY 6 HOURS PRN
Status: DISCONTINUED | OUTPATIENT
Start: 2022-11-04 | End: 2022-11-08 | Stop reason: HOSPADM

## 2022-11-04 RX ADMIN — ALBUTEROL SULFATE 2.5 MG: 2.5 SOLUTION RESPIRATORY (INHALATION) at 18:13

## 2022-11-04 RX ADMIN — NITROGLYCERIN 0.5 INCH: 20 OINTMENT TOPICAL at 16:53

## 2022-11-04 RX ADMIN — DEXTROSE MONOHYDRATE 25 G: 25 INJECTION, SOLUTION INTRAVENOUS at 17:33

## 2022-11-04 RX ADMIN — CALCIUM GLUCONATE 1000 MG: 94 INJECTION, SOLUTION INTRAVENOUS at 16:55

## 2022-11-04 RX ADMIN — ONDANSETRON 4 MG: 2 INJECTION INTRAMUSCULAR; INTRAVENOUS at 20:55

## 2022-11-04 RX ADMIN — INSULIN HUMAN 10 UNITS: 100 INJECTION, SOLUTION PARENTERAL at 17:32

## 2022-11-04 RX ADMIN — FENTANYL CITRATE 50 MCG: 0.05 INJECTION, SOLUTION INTRAMUSCULAR; INTRAVENOUS at 20:55

## 2022-11-04 RX ADMIN — ASPIRIN 81 MG CHEWABLE TABLET 324 MG: 81 TABLET CHEWABLE at 16:53

## 2022-11-04 RX ADMIN — SODIUM BICARBONATE: 84 INJECTION, SOLUTION INTRAVENOUS at 18:18

## 2022-11-04 ASSESSMENT — ENCOUNTER SYMPTOMS
ABDOMINAL PAIN: 0
APNEA: 0
EYE ITCHING: 0
BACK PAIN: 0
CHEST TIGHTNESS: 0
ABDOMINAL DISTENTION: 0
EYE DISCHARGE: 0

## 2022-11-04 NOTE — ED PROVIDER NOTES
reactive to light. Cardiovascular:      Rate and Rhythm: Normal rate and regular rhythm. Pulses: Normal pulses. Heart sounds: Normal heart sounds. Pulmonary:      Effort: Pulmonary effort is normal. No respiratory distress. Breath sounds: Normal breath sounds. No wheezing or rales. Abdominal:      General: Bowel sounds are normal. There is no distension. Tenderness: There is no abdominal tenderness. Musculoskeletal:         General: Normal range of motion. Skin:     General: Skin is warm and dry. Capillary Refill: Capillary refill takes less than 2 seconds. Neurological:      General: No focal deficit present. Mental Status: He is alert and oriented to person, place, and time. Psychiatric:         Mood and Affect: Mood normal.         Thought Content: Thought content normal.        Procedures     MDM  Number of Diagnoses or Management Options  Hyperkalemia  Other chest pain  Diagnosis management comments: Is a very pleasant 80-year-old male that is a  that reports to the ER after being called by the [facility] that his potassium was 6.7 and go to the nearest ER. He reports that this ER via private vehicle. Upon evaluation is AOx4, nontoxic-appearing, nonhypoxic on room air, normotensive and hemodynamically stable, afebrile, and neurovascular intact. His EKG showed concerns of hyperkalemia with elevated T waves. He was immediately given 1 g of calcium gluconate before labs returned. Laboratory evaluation was consistent with hyperkalemia and SUZIE. His potassium was 6.5 and BUN 32 and creatinine 1.7. He was 10 units of insulin, with D5 normal saline, albuterol inhaler, Kayexalate, and sodium bicarb. Patient's repeat potassium 5.4. Patient remained hemodynamically stable throughout his ER stay. Discussed case with hospitalist who agreed to admit the patient for further evaluation work-up. Patient verbally consented agreed to the plan.   Patient stable at time of admission. ED Course as of 11/05/22 1507   Fri Nov 04, 2022 1456 EKG: This EKG is signed and interpreted by the EP. Time: 14:53  Rate: 76  Rhythm: 14:53  Interpretation: V paced rhythm  Comparison: stable as compared to patient's most recent EKG   [CF]   2105 Spoke with [facility] at 2105, agreed to admit for further evaluation and workup [JR]      ED Course User Index  [CF] Florentino Gardner DO  [JR] Kevin Covarrubias DO         ED Course as of 11/05/22 1507   Fri Nov 04, 2022 1456 EKG: This EKG is signed and interpreted by the EP. Time: 14:53  Rate: 76  Rhythm: 14:53  Interpretation: V paced rhythm  Comparison: stable as compared to patient's most recent EKG   [CF]   2105 Spoke with [facility] at 2105, agreed to admit for further evaluation and workup [JR]      ED Course User Index  [CF] Florentino Gardner DO  [JR] Kevin Covarrubias DO       --------------------------------------------- PAST HISTORY ---------------------------------------------  Past Medical History:  has a past medical history of Alcohol abuse, Anemia, Apnea, sleep, Arthritis, Atrial fibrillation (HCC), Atrial flutter ([facility] [address] CAD (coronary artery disease), CHF (congestive heart failure) ([facility] [address] CKD (chronic kidney disease), ED (erectile dysfunction), Hearing loss, Hepatitis C, Hyperkalemia, Hypertension, Obesity, Pacemaker, and Vitamin D deficiency. Past Surgical History:  has a past surgical history that includes ablation of dysrhythmic focus; shoulder surgery; Finger amputation; and hernia repair. Social History:  reports that he quit smoking about 53 years ago. His smoking use included cigarettes. He has a 45.00 pack-year smoking history. He has never used smokeless tobacco. He reports current alcohol use of about 14.0 standard drinks per week. He reports that he does not currently use drugs.     Family History: family history includes Heart Attack in his father, paternal aunt, paternal cousin, and paternal .     The patients home medications have been reviewed.     Allergies: Coumadin [warfarin sodium] and Heparin    -------------------------------------------------- RESULTS -------------------------------------------------    LABS:  Results for orders placed or performed during the hospital encounter of 11/04/22   CMP   Result Value Ref Range    Sodium 136 132 - 146 mmol/L    Potassium 6.5 (H) 3.5 - 5.0 mmol/L    Chloride 106 98 - 107 mmol/L    CO2 20 (L) 22 - 29 mmol/L    Anion Gap 10 7 - 16 mmol/L    Glucose 100 (H) 74 - 99 mg/dL    BUN 32 (H) 6 - 23 mg/dL    Creatinine 1.7 (H) 0.7 - 1.2 mg/dL    Est, Glom Filt Rate 40 >=60 mL/min/1.73    Calcium 9.6 8.6 - 10.2 mg/dL    Total Protein 7.1 6.4 - 8.3 g/dL    Albumin 3.9 3.5 - 5.2 g/dL    Total Bilirubin 0.7 0.0 - 1.2 mg/dL    Alkaline Phosphatase 99 40 - 129 U/L    ALT 22 0 - 40 U/L    AST 22 0 - 39 U/L   Magnesium   Result Value Ref Range    Magnesium 1.7 1.6 - 2.6 mg/dL   CBC with Auto Differential   Result Value Ref Range    WBC 7.8 4.5 - 11.5 E9/L    RBC 4.47 3.80 - 5.80 E12/L    Hemoglobin 14.3 12.5 - 16.5 g/dL    Hematocrit 44.3 37.0 - 54.0 %    MCV 99.1 80.0 - 99.9 fL    MCH 32.0 26.0 - 35.0 pg    MCHC 32.3 32.0 - 34.5 %    RDW 13.2 11.5 - 15.0 fL    Platelets 019 035 - 632 E9/L    MPV 11.3 7.0 - 12.0 fL    Neutrophils % 74.5 43.0 - 80.0 %    Immature Granulocytes % 0.4 0.0 - 5.0 %    Lymphocytes % 16.1 (L) 20.0 - 42.0 %    Monocytes % 7.2 2.0 - 12.0 %    Eosinophils % 1.5 0.0 - 6.0 %    Basophils % 0.3 0.0 - 2.0 %    Neutrophils Absolute 5.79 1.80 - 7.30 E9/L    Immature Granulocytes # 0.03 E9/L    Lymphocytes Absolute 1.25 (L) 1.50 - 4.00 E9/L    Monocytes Absolute 0.56 0.10 - 0.95 E9/L    Eosinophils Absolute 0.12 0.05 - 0.50 E9/L    Basophils Absolute 0.02 0.00 - 0.20 E9/L   Troponin   Result Value Ref Range    Troponin, High Sensitivity 34 (H) 0 - 11 ng/L   Protime-INR   Result Value Ref Range    Protime 13.7 (H) 9.3 - 12.4 sec    INR 1.2 Urinalysis   Result Value Ref Range    Color, UA Yellow Straw/Yellow    Clarity, UA Clear Clear    Glucose, Ur Negative Negative mg/dL    Bilirubin Urine Negative Negative    Ketones, Urine Negative Negative mg/dL    Specific Gravity, UA >=1.030 1.005 - 1.030    Blood, Urine Negative Negative    pH, UA 5.5 5.0 - 9.0    Protein, UA TRACE Negative mg/dL    Urobilinogen, Urine 0.2 <2.0 E.U./dL    Nitrite, Urine Negative Negative    Leukocyte Esterase, Urine Negative Negative   Troponin   Result Value Ref Range    Troponin, High Sensitivity 31 (H) 0 - 11 ng/L   BMP   Result Value Ref Range    Sodium 133 132 - 146 mmol/L    Potassium 5.7 (H) 3.5 - 5.0 mmol/L    Chloride 103 98 - 107 mmol/L    CO2 19 (L) 22 - 29 mmol/L    Anion Gap 11 7 - 16 mmol/L    Glucose 199 (H) 74 - 99 mg/dL    BUN 32 (H) 6 - 23 mg/dL    Creatinine 1.7 (H) 0.7 - 1.2 mg/dL    Est, Glom Filt Rate 40 >=60 mL/min/1.73    Calcium 9.6 8.6 - 10.2 mg/dL   Microscopic Urinalysis   Result Value Ref Range    Mucus, UA Present (A) None Seen /LPF    WBC, UA 0-1 0 - 5 /HPF    RBC, UA NONE 0 - 2 /HPF    Epithelial Cells, UA FEW /HPF    Bacteria, UA FEW (A) None Seen /HPF   TSH   Result Value Ref Range    TSH 1.300 0.270 - 4.200 uIU/mL   C-Reactive Protein   Result Value Ref Range    CRP 0.4 0.0 - 0.4 mg/dL   Sedimentation Rate   Result Value Ref Range    Sed Rate 68 (H) 0 - 15 mm/Hr   Basic Metabolic Panel w/ Reflex to MG   Result Value Ref Range    Sodium 134 132 - 146 mmol/L    Potassium reflex Magnesium 5.4 (H) 3.5 - 5.0 mmol/L    Chloride 106 98 - 107 mmol/L    CO2 20 (L) 22 - 29 mmol/L    Anion Gap 8 7 - 16 mmol/L    Glucose 121 (H) 74 - 99 mg/dL    BUN 34 (H) 6 - 23 mg/dL    Creatinine 1.6 (H) 0.7 - 1.2 mg/dL    Est, Glom Filt Rate 43 >=60 mL/min/1.73    Calcium 9.6 8.6 - 10.2 mg/dL   POCT Glucose   Result Value Ref Range    Meter Glucose 171 (H) 74 - 99 mg/dL   EKG 12 Lead   Result Value Ref Range    Ventricular Rate 76 BPM    Atrial Rate 75 BPM    QRS Duration 188 ms    Q-T Interval 448 ms    QTc Calculation (Bazett) 504 ms    R Axis -83 degrees    T Axis 90 degrees       RADIOLOGY:  XR CHEST PORTABLE   Final Result   Cardiomegaly with no acute infiltrate or effusion and no significant interval   change since the previous exam.             ------------------------- NURSING NOTES AND VITALS REVIEWED ---------------------------  Date / Time Roomed:  11/4/2022  2:53 PM  ED Bed Assignment:  [id number]-C    The nursing notes within the ED encounter and vital signs as below have been reviewed. Patient Vitals for the past 24 hrs:   BP Temp Temp src Pulse Resp SpO2 Weight   11/05/22 1445 104/82 98.1 °F (36.7 °C) Oral 76 16 98 % --   11/05/22 0811 101/60 97.3 °F (36.3 °C) Temporal 71 16 94 % --   11/05/22 0230 113/61 97.9 °F (36.6 °C) -- 74 16 98 % 232 lb (105.2 kg)   11/04/22 2217 113/76 97.5 °F (36.4 °C) Oral 76 -- 99 % --   11/04/22 2125 118/74 98.3 °F (36.8 °C) Oral 80 16 94 % --   11/04/22 1815 110/70 -- -- 73 18 97 % --       Oxygen Saturation Interpretation: Normal    ------------------------------------------ PROGRESS NOTES ------------------------------------------  Re-evaluation(s):  Time: 2100  Patients symptoms are improving  Repeat physical examination is improved    Counseling:  I have spoken with the patient and discussed todays results, in addition to providing specific details for the plan of care and counseling regarding the diagnosis and prognosis. Their questions are answered at this time and they are agreeable with the plan of admission.    --------------------------------- ADDITIONAL PROVIDER NOTES ---------------------------------  Consultations:  Time: 2105. Spoke with Dr. Sae Wright Physician. Discussed case. They will admit the patient.   This patient's ED course included: a personal history and physicial examination, re-evaluation prior to disposition, multiple bedside re-evaluations, IV medications, cardiac monitoring, and continuous pulse oximetry    This patient has remained hemodynamically stable during their ED course. Diagnosis:  1. Other chest pain    2. Hyperkalemia        Disposition:  Patient's disposition: Admit to telemetry  Patient's condition is stable.         Randolph Lemon DO  Resident  11/05/22 7537

## 2022-11-04 NOTE — TELEPHONE ENCOUNTER
Writer contacted Dr. Conor Monroe to inform of 30 day readmission risk. Dr. Conor Monroe informed writer of potential readmission. Pending lab results.

## 2022-11-05 LAB
ANION GAP SERPL CALCULATED.3IONS-SCNC: 8 MMOL/L (ref 7–16)
BUN BLDV-MCNC: 34 MG/DL (ref 6–23)
C-REACTIVE PROTEIN: 0.4 MG/DL (ref 0–0.4)
CALCIUM SERPL-MCNC: 9.6 MG/DL (ref 8.6–10.2)
CHLORIDE BLD-SCNC: 106 MMOL/L (ref 98–107)
CO2: 20 MMOL/L (ref 22–29)
CREAT SERPL-MCNC: 1.6 MG/DL (ref 0.7–1.2)
GFR SERPL CREATININE-BSD FRML MDRD: 43 ML/MIN/1.73
GLUCOSE BLD-MCNC: 121 MG/DL (ref 74–99)
POTASSIUM REFLEX MAGNESIUM: 5.4 MMOL/L (ref 3.5–5)
SEDIMENTATION RATE, ERYTHROCYTE: 68 MM/HR (ref 0–15)
SODIUM BLD-SCNC: 134 MMOL/L (ref 132–146)
TSH SERPL DL<=0.05 MIU/L-ACNC: 1.3 UIU/ML (ref 0.27–4.2)

## 2022-11-05 PROCEDURE — 85651 RBC SED RATE NONAUTOMATED: CPT

## 2022-11-05 PROCEDURE — 86140 C-REACTIVE PROTEIN: CPT

## 2022-11-05 PROCEDURE — 2580000003 HC RX 258: Performed by: STUDENT IN AN ORGANIZED HEALTH CARE EDUCATION/TRAINING PROGRAM

## 2022-11-05 PROCEDURE — 87045 FECES CULTURE AEROBIC BACT: CPT

## 2022-11-05 PROCEDURE — 87077 CULTURE AEROBIC IDENTIFY: CPT

## 2022-11-05 PROCEDURE — 6370000000 HC RX 637 (ALT 250 FOR IP): Performed by: STUDENT IN AN ORGANIZED HEALTH CARE EDUCATION/TRAINING PROGRAM

## 2022-11-05 PROCEDURE — 2060000000 HC ICU INTERMEDIATE R&B

## 2022-11-05 PROCEDURE — 36415 COLL VENOUS BLD VENIPUNCTURE: CPT

## 2022-11-05 PROCEDURE — 99233 SBSQ HOSP IP/OBS HIGH 50: CPT | Performed by: INTERNAL MEDICINE

## 2022-11-05 PROCEDURE — 80048 BASIC METABOLIC PNL TOTAL CA: CPT

## 2022-11-05 PROCEDURE — 84443 ASSAY THYROID STIM HORMONE: CPT

## 2022-11-05 RX ADMIN — SODIUM CHLORIDE: 9 INJECTION, SOLUTION INTRAVENOUS at 02:35

## 2022-11-05 RX ADMIN — GUAIFENESIN 400 MG: 400 TABLET ORAL at 21:27

## 2022-11-05 RX ADMIN — APIXABAN 2.5 MG: 2.5 TABLET, FILM COATED ORAL at 08:17

## 2022-11-05 RX ADMIN — Medication 10 ML: at 21:31

## 2022-11-05 RX ADMIN — CETIRIZINE HYDROCHLORIDE 10 MG: 10 TABLET, FILM COATED ORAL at 08:17

## 2022-11-05 RX ADMIN — APIXABAN 2.5 MG: 2.5 TABLET, FILM COATED ORAL at 21:27

## 2022-11-05 RX ADMIN — METOPROLOL SUCCINATE 100 MG: 100 TABLET, EXTENDED RELEASE ORAL at 08:17

## 2022-11-05 NOTE — PROGRESS NOTES
Pharmacy Note    An order for Zofran has been discontinued for this patient due to the risk of QT prolongation. If an antiemetic is indicated for your patient, please consider use of Tigan or Compazine. Current QTc = 504 ms  Please contact the Pharmacy with any questions.   Delcia Duverney, [address] [place] [address]  11/4/2022  11:02 PM

## 2022-11-05 NOTE — H&P
[facility] History and Physical      CHIEF COMPLAINT: Hyperkalemia    History of Present Illness:  27-year-old male with history of hypertension, HFrEF, A. fib with AV node ablation and pacer (on Eliquis), who presents with hyperkalemia (K+ 6.7) found to his PCPs office. Patient reports that he was discharged from [facility] several weeks ago for CHF exacerbation and was started on spironolactone, potassium, metoprolol, and ever since then has been feeling fatigued, shortness of breath with exertion, and intermittent chest pressure. Also reports that he has been having diarrhea several times a day for the past several weeks. Denies any nausea/vomiting. No other acute complaints. ED, patient was found to have a potassium 6.4 which was treated medically with a repeat potassium 5.7. Creatinine was also noted to be 1.7 which is an SUZIE (baseline of 1.1) and patient was admitted for further management        REVIEW OF SYSTEMS:  A comprehensive review of systems was negative except for: what is in the HPI    PMH:  Past Medical History:   Diagnosis Date    Alcohol abuse     Anemia     Apnea, sleep     Arthritis     Atrial fibrillation (HCC)     Atrial flutter (HCC)     CAD (coronary artery disease)     CHF (congestive heart failure) (HCC)     CKD (chronic kidney disease)     ED (erectile dysfunction)     Hearing loss     Hepatitis C     Hyperkalemia     Hypertension     Obesity     Pacemaker     Vitamin D deficiency        Surgical History:  Past Surgical History:   Procedure Laterality Date    ABLATION OF DYSRHYTHMIC FOCUS      x4    FINGER AMPUTATION      x3 right hand    HERNIA REPAIR      SHOULDER SURGERY      total replacement bilat       Medications Prior to Admission:    Prior to Admission medications    Medication Sig Start Date End Date Taking?  Authorizing Provider   metoprolol succinate (TOPROL XL) 100 MG extended release tablet Take 1 tablet by mouth daily 10/15/22   Shivani Vegas Carrier, APRN - CNP   potassium chloride (KLOR-CON M) 20 MEQ extended release tablet Take 2 tablets by mouth daily 10/15/22   Rosas [place], APRN - CNP   sacubitril-valsartan (ENTRESTO) 49-51 MG per tablet Take 1 tablet by mouth 2 times daily 10/14/22   Rosas [place], APRN - CNP   spironolactone (ALDACTONE) 25 MG tablet Take 1 tablet by mouth daily 10/14/22   [facility], APRN - CNP   guaiFENesin 400 MG tablet Take 1 tablet by mouth 2 times daily as needed for Cough 8/15/21   Rachael Ravi DO   vitamin C (ASCORBIC ACID) 500 MG tablet Take 2 tablets by mouth daily for 10 days 11/22/20 10/11/22  MANUEL Stiles - CNS   bumetanide (BUMEX) 1 MG tablet Take 1 tablet by mouth 2 times daily 11/22/20   Jesusita Bal APRN - CNS   apixaban (ELIQUIS) 5 MG TABS tablet Take by mouth 2 times daily    Historical Provider, MD   cetirizine (ZYRTEC) 10 MG tablet Take 10 mg by mouth daily. Historical Provider, MD       Allergies:    Coumadin [warfarin sodium] and Heparin    Social History:    reports that he quit smoking about 53 years ago. His smoking use included cigarettes. He has a 45.00 pack-year smoking history. He has never used smokeless tobacco. He reports current alcohol use of about 14.0 standard drinks per week. He reports that he does not currently use drugs. Family History:   family history includes Heart Attack in his father, paternal aunt, paternal cousin, and paternal uncle. PHYSICAL EXAM:  Vitals:  /74   Pulse 80   Temp 98.3 °F (36.8 °C) (Oral)   Resp 16   SpO2 94%       General: Well developed, well nourished. No acute distress. HEENT: NCAT. PERRL, non-injected conjunctiva, non-icteric sclera. External eyelids without pus/discharge. No lesions on external ears and nose anteriorly. Moist mucous membranes, normal posterior oropharynx. Neck: Trachea midline, no gross masses visualized. No thyromegaly noted. Cardiovascular: RRR, no rubs/gallops. Mild right LE edema. Respiratory: Normal effort, occasional mild wheezing noted, no rhonchi/rales. Abdomen: Soft, ND/NT, bowel sounds present. HSM difficult to assess due to body habitus. Genitourinary: Deferred  Rectal: Deferred  Neurological: Grossly nonfocal.   Psychiatry: Appropriate mood and affect. AAOx3. Skin: Warm and non-taut on palpation. No ulcers noted on visible skin. LABS:  Recent Labs     11/04/22  1529 11/04/22  1756    133   K 6.5* 5.7*    103   CO2 20* 19*   BUN 32* 32*   CREATININE 1.7* 1.7*   GLUCOSE 100* 199*   CALCIUM 9.6 9.6       Recent Labs     11/04/22  1529   WBC 7.8   RBC 4.47   HGB 14.3   HCT 44.3   MCV 99.1   MCH 32.0   MCHC 32.3   RDW 13.2      MPV 11.3       No results for input(s): POCGLU in the last 72 hours. Radiology:   XR CHEST PORTABLE    (Results Pending)       EKG (Independent Interpretation): Ventricular paced rhythm, 76    ASSESSMENT:      Principal Problem:    Hyperkalemia  Active Problems:    SUZIE (acute kidney injury) ([facility] [address]    Fatigue  Resolved Problems:    * No resolved hospital problems. *      PLAN:    Admitted for hyperkalemia in the setting of an SUZIE with a creatinine 1.7 (baseline 1.1). Patient was recently discharged from [address] several weeks ago for CHF exacerbation and was started on spironolactone, metoprolol, potassium supplementation. He has also reported diarrhea several times a day for the past several weeks. As such, will provide gentle volume repletion with normal saline 100 cc/h for 12 hours. Do not want to provide aggressive fluid therapy due to patient's CHF history. We will hold patient's spironolactone as well as potassium due to hyperkalemia. We will hold patient's p.o. home Lasix as well as Entresto due to SUZIE. Otherwise, patient does report some fatigue and shortness of breath since being discharged from [address] several weeks ago. We will check TSH, inflammatory markers, chest x-ray.     Lastly, check urine studies for SUZIE. Code Status: DNR CCA  DVT prophylaxis: Home Eliquis      NOTE: This report was transcribed using voice recognition software. Every effort was made to ensure accuracy; however, inadvertent computerized transcription errors may be present and meaning should be derived from surrounding context.    Electronically signed by Nhung Lopez MD on 11/4/2022 at 10:02 PM

## 2022-11-05 NOTE — PROGRESS NOTES
[facility] Hospital[facility]   Progress Note    Admitting Date and Time: 11/4/2022  2:53 PM  Admit Dx: Other chest pain [R07.89]  Hyperkalemia [E87.5]    Subjective: Admitted on fourth, presented with hyperkalemia, patient with known hypertension, systolic heart failure, EF in October noted at 40%, A. fib, s/p ablation, pacer placement, on anticoagulation, recent DC from [facility] then was started on Aldactone, seen by Dr. Mary Winkler on October 12, also came with creatinine of 1.7, receiving IV fluids,    Patient was admitted with Other chest pain [R07.89]  Hyperkalemia [E87.5]. Patient feels comfortable, was sleeping without difficulty as did not get much sleep in the night, wakes up easily, well oriented, communicates well, does say that he had not seen cardiology after discharge from a hospital in October 12. Denies any issues with prostate in past.    Per RN: No overnight issues. ROS: denies fever, chills, cp, sob, n/v, HA unless stated above.      apixaban  2.5 mg Oral BID    cetirizine  10 mg Oral Daily    metoprolol succinate  100 mg Oral Daily    sodium chloride flush  5-40 mL IntraVENous 2 times per day     guaiFENesin, 400 mg, BID PRN  sodium chloride flush, 5-40 mL, PRN  sodium chloride, , PRN  polyethylene glycol, 17 g, Daily PRN  acetaminophen, 650 mg, Q6H PRN   Or  acetaminophen, 650 mg, Q6H PRN         Objective:    /61   Pulse 74   Temp 97.9 °F (36.6 °C)   Resp 16   Wt 232 lb (105.2 kg)   SpO2 98%   BMI 33.29 kg/m²   General Appearance: alert and oriented to person, place and time, well-developed and well-nourished, in no acute distress  Skin: warm and dry, no rash or erythema, well perfused, no decrease in skin turgor  Head: normocephalic and atraumatic  Eyes: pupils equal, round, and reactive to light, extraocular eye movements intact, conjunctivae normal  ENT: tympanic membrane, external ear and ear canal normal bilaterally, oropharynx clear and moist with normal mucous membranes  Neck: neck supple and non tender without mass, no thyromegaly or thyroid nodules, no cervical lymphadenopathy   Pulmonary/Chest: clear to auscultation bilaterally- no wheezes, rales or rhonchi, normal air movement, no respiratory distress  Cardiovascular: normal rate, normal S1 and S2, no gallops, intact distal pulses, and no carotid bruits  Abdomen: soft, non-tender, non-distended, normal bowel sounds, no masses or organomegaly  No edema      Recent Labs     11/04/22  1529 11/04/22  1756 11/05/22  0309    133 134   K 6.5* 5.7* 5.4*    103 106   CO2 20* 19* 20*   BUN 32* 32* 34*   CREATININE 1.7* 1.7* 1.6*   GLUCOSE 100* 199* 121*   CALCIUM 9.6 9.6 9.6       Recent Labs     11/04/22  1529   WBC 7.8   RBC 4.47   HGB 14.3   HCT 44.3   MCV 99.1   MCH 32.0   MCHC 32.3   RDW 13.2      MPV 11.3     Creatinine 1.7 /1.6  Last K of 5.4    Radiology:   XR CHEST PORTABLE   Final Result   Cardiomegaly with no acute infiltrate or effusion and no significant interval   change since the previous exam.             Assessment:    Principal Problem:    Hyperkalemia  Active Problems:    SUZIE (acute kidney injury) ([address]    Fatigue  Resolved Problems:    * No resolved hospital problems. *      Plan:  SUZIE, improving, patient being hydrated. Hypokalemia, in part also related to the medications, patient was started on Entresto, Aldactone, Bumex in October from cardiology side. Have requested consultation with the same group for further management for the medications to manage heart failure. Patient with chronic diarrhea, according to him has frequency of 6-7 times a day, however none today, last BM was last evening, will start with basic stool studies while observing him. Though patient denies any prostate issues will get postvoid residual.  Diabetes, Accu-Cheks well controlled. DVT prophylaxis, patient is on anticoagulation, no change.         Electronically signed by Lee Sumner MD on 11/5/2022 at 7:44 AM

## 2022-11-05 NOTE — PROGRESS NOTES
This patient is on medication that requires renal, weight, and/or indication dose adjustment. Date Body Weight IBW  Adjusted BW SCr  CrCl Dialysis status   11/5/2022 232 lb (105.2 kg) Ideal body weight: 73 kg (160 lb 15 oz)  Adjusted ideal body weight: 85.9 kg (189 lb 5.8 oz) Serum creatinine: 1.6 mg/dL (H) 11/05/22 0309  Estimated creatinine clearance: 43 mL/min (A) N/a       Pharmacy has dose-adjusted the following medication(s):    Date Previous Order Adjusted Order   11/5/2022 Apixaban 5 mg twice daily Apixaban 2.5 mg twice daily       These changes were made per protocol according to the [address] for Pharmacists. *Please note this dose may need readjusted if patient's condition changes. Please contact pharmacy with any questions regarding these changes.     stephanie valadez, [address] [place] [address]  11/5/2022  6:30 AM

## 2022-11-06 LAB
ALBUMIN SERPL-MCNC: 3.8 G/DL (ref 3.5–5.2)
ALP BLD-CCNC: 103 U/L (ref 40–129)
ALT SERPL-CCNC: 20 U/L (ref 0–40)
ANION GAP SERPL CALCULATED.3IONS-SCNC: 9 MMOL/L (ref 7–16)
AST SERPL-CCNC: 22 U/L (ref 0–39)
BASOPHILS ABSOLUTE: 0.03 E9/L (ref 0–0.2)
BASOPHILS RELATIVE PERCENT: 0.4 % (ref 0–2)
BILIRUB SERPL-MCNC: 0.7 MG/DL (ref 0–1.2)
BUN BLDV-MCNC: 29 MG/DL (ref 6–23)
CALCIUM SERPL-MCNC: 9.4 MG/DL (ref 8.6–10.2)
CHLORIDE BLD-SCNC: 107 MMOL/L (ref 98–107)
CO2: 22 MMOL/L (ref 22–29)
CREAT SERPL-MCNC: 1.4 MG/DL (ref 0.7–1.2)
EKG ATRIAL RATE: 75 BPM
EKG Q-T INTERVAL: 448 MS
EKG QRS DURATION: 188 MS
EKG QTC CALCULATION (BAZETT): 504 MS
EKG R AXIS: -83 DEGREES
EKG T AXIS: 90 DEGREES
EKG VENTRICULAR RATE: 76 BPM
EOSINOPHILS ABSOLUTE: 0.34 E9/L (ref 0.05–0.5)
EOSINOPHILS RELATIVE PERCENT: 4.9 % (ref 0–6)
GFR SERPL CREATININE-BSD FRML MDRD: 50 ML/MIN/1.73
GLUCOSE BLD-MCNC: 99 MG/DL (ref 74–99)
HCT VFR BLD CALC: 43.2 % (ref 37–54)
HEMOGLOBIN: 14.1 G/DL (ref 12.5–16.5)
IMMATURE GRANULOCYTES #: 0.03 E9/L
IMMATURE GRANULOCYTES %: 0.4 % (ref 0–5)
LYMPHOCYTES ABSOLUTE: 1.31 E9/L (ref 1.5–4)
LYMPHOCYTES RELATIVE PERCENT: 19 % (ref 20–42)
MCH RBC QN AUTO: 32.4 PG (ref 26–35)
MCHC RBC AUTO-ENTMCNC: 32.6 % (ref 32–34.5)
MCV RBC AUTO: 99.3 FL (ref 80–99.9)
METER GLUCOSE: 106 MG/DL (ref 74–99)
METER GLUCOSE: 114 MG/DL (ref 74–99)
METER GLUCOSE: 115 MG/DL (ref 74–99)
METER GLUCOSE: 136 MG/DL (ref 74–99)
MONOCYTES ABSOLUTE: 0.5 E9/L (ref 0.1–0.95)
MONOCYTES RELATIVE PERCENT: 7.2 % (ref 2–12)
NEUTROPHILS ABSOLUTE: 4.7 E9/L (ref 1.8–7.3)
NEUTROPHILS RELATIVE PERCENT: 68.1 % (ref 43–80)
PDW BLD-RTO: 13.2 FL (ref 11.5–15)
PLATELET # BLD: 221 E9/L (ref 130–450)
PMV BLD AUTO: 11.5 FL (ref 7–12)
POTASSIUM REFLEX MAGNESIUM: 6.2 MMOL/L (ref 3.5–5)
POTASSIUM SERPL-SCNC: 5.7 MMOL/L (ref 3.5–5)
RBC # BLD: 4.35 E12/L (ref 3.8–5.8)
SODIUM BLD-SCNC: 138 MMOL/L (ref 132–146)
TOTAL PROTEIN: 7.5 G/DL (ref 6.4–8.3)
WBC # BLD: 6.9 E9/L (ref 4.5–11.5)

## 2022-11-06 PROCEDURE — 80053 COMPREHEN METABOLIC PANEL: CPT

## 2022-11-06 PROCEDURE — 84132 ASSAY OF SERUM POTASSIUM: CPT

## 2022-11-06 PROCEDURE — 2060000000 HC ICU INTERMEDIATE R&B

## 2022-11-06 PROCEDURE — 93005 ELECTROCARDIOGRAM TRACING: CPT

## 2022-11-06 PROCEDURE — 99233 SBSQ HOSP IP/OBS HIGH 50: CPT | Performed by: INTERNAL MEDICINE

## 2022-11-06 PROCEDURE — 85025 COMPLETE CBC W/AUTO DIFF WBC: CPT

## 2022-11-06 PROCEDURE — 36415 COLL VENOUS BLD VENIPUNCTURE: CPT

## 2022-11-06 PROCEDURE — 82962 GLUCOSE BLOOD TEST: CPT

## 2022-11-06 PROCEDURE — 6370000000 HC RX 637 (ALT 250 FOR IP)

## 2022-11-06 PROCEDURE — 2580000003 HC RX 258: Performed by: STUDENT IN AN ORGANIZED HEALTH CARE EDUCATION/TRAINING PROGRAM

## 2022-11-06 PROCEDURE — 6370000000 HC RX 637 (ALT 250 FOR IP): Performed by: STUDENT IN AN ORGANIZED HEALTH CARE EDUCATION/TRAINING PROGRAM

## 2022-11-06 PROCEDURE — 2580000003 HC RX 258

## 2022-11-06 RX ORDER — SODIUM POLYSTYRENE SULFONATE 15 G/60ML
15 SUSPENSION ORAL; RECTAL ONCE
Status: COMPLETED | OUTPATIENT
Start: 2022-11-06 | End: 2022-11-06

## 2022-11-06 RX ORDER — DEXTROSE MONOHYDRATE 100 MG/ML
INJECTION, SOLUTION INTRAVENOUS CONTINUOUS PRN
Status: DISCONTINUED | OUTPATIENT
Start: 2022-11-06 | End: 2022-11-08 | Stop reason: HOSPADM

## 2022-11-06 RX ADMIN — DEXTROSE MONOHYDRATE 250 ML: 100 INJECTION, SOLUTION INTRAVENOUS at 06:38

## 2022-11-06 RX ADMIN — METOPROLOL SUCCINATE 100 MG: 100 TABLET, EXTENDED RELEASE ORAL at 07:42

## 2022-11-06 RX ADMIN — CETIRIZINE HYDROCHLORIDE 10 MG: 10 TABLET, FILM COATED ORAL at 07:42

## 2022-11-06 RX ADMIN — SODIUM POLYSTYRENE SULFONATE 15 G: 15 SUSPENSION ORAL; RECTAL at 06:29

## 2022-11-06 RX ADMIN — Medication 10 ML: at 07:48

## 2022-11-06 RX ADMIN — INSULIN HUMAN 10 UNITS: 100 INJECTION, SOLUTION PARENTERAL at 06:33

## 2022-11-06 RX ADMIN — APIXABAN 5 MG: 5 TABLET, FILM COATED ORAL at 20:09

## 2022-11-06 RX ADMIN — SODIUM POLYSTYRENE SULFONATE 15 G: 15 SUSPENSION ORAL; RECTAL at 22:31

## 2022-11-06 RX ADMIN — Medication 10 ML: at 20:09

## 2022-11-06 RX ADMIN — APIXABAN 5 MG: 5 TABLET, FILM COATED ORAL at 08:14

## 2022-11-06 NOTE — CONSULTS
[facility] [address] [place] [address]    Name: Isael Zazueta    Age: 80 y.o. Date of Admission: 11/4/2022  2:53 PM    Date of Service: 11/6/2022    Reason for Consultation: chronic systolic/diastolic heart failure, SUZIE    Referring Physician: Dr Rosey López  Primary Care Physician: Samm Laureano DO    History of Present Illness: The patient is a 80y.o. year old male  presenting to the ED yesterday weakness and fatigue, weight loss and hyperkalemia 6.7. Was in the hospital 10/11-14/22 and discharged at a weight of 258 lbs now 232lbs. States his edema which has been chronic has almost resolved. Had not been on GDMT prior to last admission and started on entresto  and aldactone. Was hypokalemic most of his stay and discharged on supplemental K+ as well. K+ on discharge was 3. 9. Admits to eating mostly fast foods and was going out to drink daily, but since discharge apppetite has diminished. No chest pains or palpitations. History of hypertension, hyperlipidemia, permanent atrial fibrillation post AV node ablation in the past with permanent single-chamber pacemaker ( about 2.5 years to [facility]). On anticoagulation. Remote PCI of unknown vessel with no ischemia on Lexiscan June 2018. Echo done late 2019 revealed LVEF 35%, moderate mitral regurgitation and pulmonary hypertension. Echocardiogram completed September 4, 2019 revealed moderately severe left ventricular concentric hypertrophy, LVEF 35%, indeterminate diastolic function, moderate mitral regurgitation, mild tricuspid regurgitation and pulmonary hypertension.        Past Medical History:   Past Medical History:   Diagnosis Date    Alcohol abuse     Anemia     Apnea, sleep     Arthritis     Atrial fibrillation (HCC)     Atrial flutter (HCC)     CAD (coronary artery disease)     CHF (congestive heart failure) (HCC)     CKD (chronic kidney disease)     ED (erectile dysfunction)     Hearing loss     Hepatitis C     Hyperkalemia Hypertension     Obesity     Pacemaker     Vitamin D deficiency        Review of Systems:   Constitutional: No fever, chills, sweats  Cardiac: As per HPI  Pulmonary: No cough, wheeze, hemoptysis  HEENT: No visual disturbances, difficult swallowing  GI: No nausea, vomiting, diarrhea, abdominal pain, rectal bleeding  : No dysuria or hematuria  Endocrine: No excessive thirst, heat or cold intolerance. Musculoskeletal: No joint pain or muscle aches. No claudication  Skin: No skin breakdown or rashes  Neuro: No headache, confusion, or seizures  Psych: No depression, anxiety    Family History:  Family History   Problem Relation Age of Onset    Heart Attack Father     Heart Attack Paternal Aunt     Heart Attack Paternal Uncle     Heart Attack Paternal Cousin        Social History:  Social History     Socioeconomic History    Marital status:      Spouse name: Not on file    Number of children: Not on file    Years of education: Not on file    Highest education level: Not on file   Occupational History    Not on file   Tobacco Use    Smoking status: Former     Packs/day: 3.00     Years: 15.00     Pack years: 45.00     Types: Cigarettes     Quit date:      Years since quittin.8    Smokeless tobacco: Never   Vaping Use    Vaping Use: Never used   Substance and Sexual Activity    Alcohol use: Yes     Alcohol/week: 14.0 standard drinks     Types: 14 Shots of liquor per week     Comment: whiskey a couple times a week    Drug use: Not Currently    Sexual activity: Not on file   Other Topics Concern    Not on file   Social History Narrative    Not on file     Social Determinants of Health     Financial Resource Strain: Not on file   Food Insecurity: Not on file   Transportation Needs: Not on file   Physical Activity: Not on file   Stress: Not on file   Social Connections: Not on file   Intimate Partner Violence: Not on file   Housing Stability: Not on file       Allergies:   Allergies   Allergen Reactions Coumadin [Warfarin Sodium] Other (See Comments)     Excessive bleeding requiring transfusions      Heparin Other (See Comments)     Excessive bleeding requiring transfusions       Home Medications:  Prior to Admission medications    Medication Sig Start Date End Date Taking? Authorizing Provider   metoprolol succinate (TOPROL XL) 100 MG extended release tablet Take 1 tablet by mouth daily 10/15/22   MANUEL Noonan - CNP   potassium chloride (KLOR-CON M) 20 MEQ extended release tablet Take 2 tablets by mouth daily 10/15/22   MANUEL Noonan - AGNES   sacubitril-valsartan (ENTRESTO) 49-51 MG per tablet Take 1 tablet by mouth 2 times daily 10/14/22   Gretchen SimpleMANUEL - CNP   spironolactone (ALDACTONE) 25 MG tablet Take 1 tablet by mouth daily 10/14/22   MANUEL Noonan - AGNES   guaiFENesin 400 MG tablet Take 1 tablet by mouth 2 times daily as needed for Cough 8/15/21   Rachael Ravi DO   vitamin C (ASCORBIC ACID) 500 MG tablet Take 2 tablets by mouth daily for 10 days 11/22/20 10/11/22  MANUEL Hdz   bumetanide (BUMEX) 1 MG tablet Take 1 tablet by mouth 2 times daily 11/22/20   MANUEL Hdz   apixaban (ELIQUIS) 5 MG TABS tablet Take by mouth 2 times daily    Historical Provider, MD   cetirizine (ZYRTEC) 10 MG tablet Take 10 mg by mouth daily.     Historical Provider, MD       Current Medications:  Current Facility-Administered Medications   Medication Dose Route Frequency Provider Last Rate Last Admin    glucose chewable tablet 16 g  4 tablet Oral PRN Lizette Leyva, APRN - CNP        dextrose bolus 10% 125 mL  125 mL IntraVENous PRN Lizette Leyva, APRN - CNP        Or    dextrose bolus 10% 250 mL  250 mL IntraVENous PRN Lizette Leyva, APRN - CNP        glucagon (rDNA) injection 1 mg  1 mg SubCUTAneous PRN Lizette Leyva, APRN - CNP        dextrose 10 % infusion   IntraVENous Continuous PRN Lizette Leyva, APRN - CNP        apixaban (ELIQUIS) tablet 5 mg  5 mg Oral BID Angelo Keenan MD   5 mg at 11/06/22 0814    cetirizine (ZYRTEC) tablet 10 mg  10 mg Oral Daily Angelo Keenan MD   10 mg at 11/06/22 4369    guaiFENesin tablet 400 mg  400 mg Oral BID PRN Angelo Keenan MD   400 mg at 11/05/22 2127    metoprolol succinate (TOPROL XL) extended release tablet 100 mg  100 mg Oral Daily Angelo Keenan MD   100 mg at 11/06/22 0742    sodium chloride flush 0.9 % injection 5-40 mL  5-40 mL IntraVENous 2 times per day Angelo Keenan MD   10 mL at 11/06/22 0748    sodium chloride flush 0.9 % injection 5-40 mL  5-40 mL IntraVENous PRN Brody Solares MD        0.9 % sodium chloride infusion   IntraVENous PRN Angelo Keenan MD        polyethylene glycol (GLYCOLAX) packet 17 g  17 g Oral Daily PRN Angelo Keenan MD        acetaminophen (TYLENOL) tablet 650 mg  650 mg Oral Q6H PRN Angelo Keenan MD        Or    acetaminophen (TYLENOL) suppository 650 mg  650 mg Rectal Q6H PRN Angelo Keenan MD          dextrose      sodium chloride         Physical Exam:  /74   Pulse 71   Temp 98 °F (36.7 °C) (Temporal)   Resp 16   Wt 232 lb (105.2 kg)   SpO2 97%   BMI 33.29 kg/m²   Weight change: Wt Readings from Last 3 Encounters:   11/05/22 232 lb (105.2 kg)   10/14/22 258 lb (117 kg)   08/14/21 261 lb 6.4 oz (118.6 kg)     General: Awake, alert, oriented x3, no acute distress  HEENT: Normocephalic and atraumatic, extraocular movements intact, pupils equal and round, moist mucus membranes, sclera anicteric  Neck: No JVD, no carotid bruits, no thyromegaly, no adenopathy  Cardiac: Regular rate and rhythm, normal S1 and physiologically split S2, no S3, no S4. Apical impulse is nondisplaced. No murmurs, no pericardial rubs, no clicks. Carotid upstrokes brisk. Respiratory: Clear bilaterally; no wheezes, no rales, no rhonchi.   Unlabored respirations  Abdomen: Soft, nontender, nondistended, bowel sounds+, no hepatomegaly, no masses, no abdominal bruits  Extremities: trace edema, no cyanosis, no clubbing. Distal pulses intact  Skin: Intact, warm and dry, no rashes, no breakdown  Musculoskeletal: normal tone and strength in the upper and lower extremities bilaterally  Neuro: No focal deficits. Moves all extremities appropriately to command. Normal sensation in the upper and lower extremities bilaterally  Psychiatric: Cooperative, and normal affect    Intake/Output:    Intake/Output Summary (Last 24 hours) at 11/6/2022 1047  Last data filed at 11/6/2022 0741  Gross per 24 hour   Intake 120 ml   Output 900 ml   Net -780 ml     I/O this shift:  In: -   Out: 650 [Urine:650]    Laboratory Tests:  Last 3 CBC:  Recent Labs     11/04/22  1529 11/06/22  0450   WBC 7.8 6.9   RBC 4.47 4.35   HGB 14.3 14.1   HCT 44.3 43.2   MCV 99.1 99.3   MCH 32.0 32.4   MCHC 32.3 32.6   RDW 13.2 13.2    221   MPV 11.3 11.5       Last 3 CMP:    Recent Labs     11/04/22  1529 11/04/22  1756 11/05/22  0309 11/06/22  0450    133 134 138   K 6.5* 5.7* 5.4* 6.2*    103 106 107   CO2 20* 19* 20* 22   BUN 32* 32* 34* 29*   CREATININE 1.7* 1.7* 1.6* 1.4*   GLUCOSE 100* 199* 121* 99   CALCIUM 9.6 9.6 9.6 9.4   PROT 7.1  --   --  7.5   LABALBU 3.9  --   --  3.8   BILITOT 0.7  --   --  0.7   ALKPHOS 99  --   --  103   AST 22  --   --  22   ALT 22  --   --  20       Last 3 Mag/Phos:  Recent Labs     11/04/22  1529   MG 1.7       Last 3 CK, CKMB, Troponin  No results for input(s): CKTOTAL, CKMB, TROPONINI in the last 72 hours. Last 3 Pro-BNP:  No results for input(s): PROBNP in the last 72 hours.   Lab Results   Component Value Date    PROBNP 1,221 (H) 10/14/2022       Last 3 Glucose:     Recent Labs     11/04/22  1756 11/05/22  0309 11/06/22  0450   GLUCOSE 199* 121* 99       Last 3 Coags:  Recent Labs     11/04/22  1529   PROTIME 13.7*   INR 1.2     Lab Results   Component Value Date/Time    PROTIME 13.7 11/04/2022 03:29 PM    PROTIME 11.4 10/31/2010 10:45 AM    INR 1.2 11/04/2022 03:29 PM       Last 3 Lipid Panel:  No results for input(s): LDLCALC, HDL, TRIG in the last 72 hours. Invalid input(s): CHLPL  Lab Results   Component Value Date    LDLCALC 42 10/12/2022    LDLCALC 59 09/04/2019     Lab Results   Component Value Date    HDL 66 10/12/2022    HDL 64 09/04/2019     Lab Results   Component Value Date    TRIG 55 10/12/2022    TRIG 119 09/04/2019     No components found for: CHLPL    TSH:  Recent Labs     11/05/22  0309   TSH 1.300     Lab Results   Component Value Date/Time    TSH 1.300 11/05/2022 03:09 AM       ABGs:  No results for input(s): PH, PO2, PCO2, HCO3, BE, O2SAT in the last 72 hours. Lactic Acid:  No results for input(s): LACTA in the last 72 hours. Radiology:  RAD Results:  XR CHEST PORTABLE   Final Result   Cardiomegaly with no acute infiltrate or effusion and no significant interval   change since the previous exam.               EKG and Telemetry:  12-lead EKG personally reviewed and shows V paced rhythm    Telemetry personally reviewed and shows v pacing        ASSESSMENT / PLAN:    1.          chronic mixed systolic /diastolic HF- EF in the last 3 years 35-50% - Echo 10/12/22- 40% EF Multifactorial etiology: AF, LV function,GINA, PPM, diet/with heavy sodium intake from fast food diet. Continue  toprol as before. entresto /spironolactone /bumex on hold. 2          Afib with AV node ablation 2.5 years ago VVIR PPM followed in pacemaker clinic. 3          Anticoagulant therapy eliquis- no bleeding. 4          HTN- stable  5          CAD- stable  6          GINA- refuses therapy but certainly can contribute to HF exacerbations. 7 Hyperkalemia and SUZIE with 25 lb weight loss in 3 weeks. Holding entresto  spironolactone kdur . Allow K+ to decrease, Had historically tolerated ACE/ARB, -will reevaluate as he rehydrates, no more spironolactone. Thank you for consultation.     Lindsey Barreto MD, MD, [address] [place] [address] at [facility]    Electronically signed by Lindsey Barreto, MD on 11/6/2022 at 10:47 AM

## 2022-11-06 NOTE — CARE COORDINATION
Transition of Care-Met with patient at the bedside, readmit from 10/14, patient lives with his girlfriend in a one story home, follows at [address] [place] [address] on [place] and fills his scripts at the [address] [place] [address] and [facility]. Patient reports being independent of all ADL's, does have a walker, cane and shower chair, does not use. Patient drove himself here and will transport himself home, denies any discharge needs at this time.     Adina BARNETTN, RN  [address] [facility]

## 2022-11-06 NOTE — PROGRESS NOTES
This patient is on medication that requires renal, weight, and/or indication dose adjustment. Date Body Weight IBW  Adjusted BW SCr  CrCl Dialysis status   11/6/2022 232 lb (105.2 kg) Ideal body weight: 73 kg (160 lb 15 oz)  Adjusted ideal body weight: 85.9 kg (189 lb 5.8 oz) Serum creatinine: 1.4 mg/dL (H) 11/06/22 0450  Estimated creatinine clearance: 49 mL/min (A) N/a       Pharmacy has dose-adjusted the following medication(s):    Date Previous Order Adjusted Order   11/6/2022 Apixaban 2.5 mg twice daily Apixaban 5 mg twice daily       These changes were made per protocol according to the [address] for Pharmacists. *Please note this dose may need readjusted if patient's condition changes. Please contact pharmacy with any questions regarding these changes.     SELINA galaviz [facility]  11/6/2022  6:30 AM

## 2022-11-06 NOTE — PROGRESS NOTES
[facility]    Admitting Date and Time: 11/4/2022  2:53 PM  Admit Dx: Other chest pain [R07.89]  Hyperkalemia [E87.5]    Subjective: Admitted on fourth, presented with hyperkalemia, patient with known hypertension, systolic heart failure, EF in October noted at 40%, A. fib, s/p ablation, pacer placement, on anticoagulation, recent DC from [address] then was started on Aldactone, seen by Dr. Darylene Shames on October 12, also came with creatinine of 1.7, receiving IV fluids, now 1.4 today, K6.2, patient is off Entresto, off Aldactone, off Bumex. Patient was admitted with Other chest pain [R07.89]  Hyperkalemia [E87.5]. Patient feels comfortable, was sleeping without difficulty as did not get much sleep in the night, wakes up easily, well oriented, communicates well, does say that he had big bowel movement immediately after his breakfast.  BM definitely after the blood was drawn in the morning. Per RN: No overnight issues. ROS: denies fever, chills, cp, sob, n/v, HA unless stated above.      apixaban  5 mg Oral BID    cetirizine  10 mg Oral Daily    metoprolol succinate  100 mg Oral Daily    sodium chloride flush  5-40 mL IntraVENous 2 times per day     glucose, 4 tablet, PRN  dextrose bolus, 125 mL, PRN   Or  dextrose bolus, 250 mL, PRN  glucagon (rDNA), 1 mg, PRN  dextrose, , Continuous PRN  guaiFENesin, 400 mg, BID PRN  sodium chloride flush, 5-40 mL, PRN  sodium chloride, , PRN  polyethylene glycol, 17 g, Daily PRN  acetaminophen, 650 mg, Q6H PRN   Or  acetaminophen, 650 mg, Q6H PRN       Objective:    /74   Pulse 71   Temp 98 °F (36.7 °C) (Temporal)   Resp 16   Wt 232 lb (105.2 kg)   SpO2 97%   BMI 33.29 kg/m²   General Appearance: alert and oriented to person, place and time, well-developed and well-nourished, in no acute distress  Skin: warm and dry, no rash or erythema, well perfused, no decrease in skin turgor  Head: normocephalic and atraumatic  Eyes: pupils equal, round, and reactive to light, extraocular eye movements intact, conjunctivae normal  ENT: tympanic membrane, external ear and ear canal normal bilaterally, oropharynx clear and moist with normal mucous membranes  Neck: neck supple and non tender without mass, no thyromegaly or thyroid nodules, no cervical lymphadenopathy   Pulmonary/Chest: clear to auscultation bilaterally- no wheezes, rales or rhonchi, normal air movement, no respiratory distress  Cardiovascular: normal rate, normal S1 and S2, no gallops, intact distal pulses, and no carotid bruits  Abdomen: soft, non-tender, non-distended, normal bowel sounds, no masses or organomegaly  No edema      Recent Labs     11/04/22  1756 11/05/22  0309 11/06/22  0450    134 138   K 5.7* 5.4* 6.2*    106 107   CO2 19* 20* 22   BUN 32* 34* 29*   CREATININE 1.7* 1.6* 1.4*   GLUCOSE 199* 121* 99   CALCIUM 9.6 9.6 9.4         Recent Labs     11/04/22  1529 11/06/22  0450   WBC 7.8 6.9   RBC 4.47 4.35   HGB 14.3 14.1   HCT 44.3 43.2   MCV 99.1 99.3   MCH 32.0 32.4   MCHC 32.3 32.6   RDW 13.2 13.2    221   MPV 11.3 11.5       Creatinine 1.7 /1.6  Last K of 5.4/6.2    Radiology:   XR CHEST PORTABLE   Final Result   Cardiomegaly with no acute infiltrate or effusion and no significant interval   change since the previous exam.             Assessment:    Principal Problem:    Hyperkalemia  Active Problems:    SUZIE (acute kidney injury) ([facility] [address]    Fatigue  Resolved Problems:    * No resolved hospital problems. *      Plan:  SUZIE, improving, now creatinine 1.4, patient being hydrated. Hyperkalemia, in part also related to the medications, patient was started on Entresto, Aldactone, Bumex in October from cardiology side. At this time remains on hold. Inzo K level is higher in the a.m. blood draw patient did have BM after that, for now will observe.   Patient with chronic diarrhea, according to him has frequency of 6-7 times a day, however none today, last BM was last evening, will start with basic stool studies while observing him. Though patient denies any prostate issues will get postvoid residual.  Diabetes, Accu-Cheks well controlled. DVT prophylaxis, patient is on anticoagulation, no change. DC planning, will be decided after evaluated by cardiology, can happen as early as later today if cardiology decides to follow the labs in outpatient setting after medication changes otherwise will be when okay from cardiac side.         Electronically signed by Dorene Gabriel MD on 11/6/2022 at 8:42 AM

## 2022-11-07 LAB
ANION GAP SERPL CALCULATED.3IONS-SCNC: 10 MMOL/L (ref 7–16)
ANION GAP SERPL CALCULATED.3IONS-SCNC: 8 MMOL/L (ref 7–16)
BASOPHILS ABSOLUTE: 0.02 E9/L (ref 0–0.2)
BASOPHILS RELATIVE PERCENT: 0.3 % (ref 0–2)
BUN BLDV-MCNC: 30 MG/DL (ref 6–23)
BUN BLDV-MCNC: 30 MG/DL (ref 6–23)
CALCIUM SERPL-MCNC: 9.2 MG/DL (ref 8.6–10.2)
CALCIUM SERPL-MCNC: 9.3 MG/DL (ref 8.6–10.2)
CHLORIDE BLD-SCNC: 102 MMOL/L (ref 98–107)
CHLORIDE BLD-SCNC: 105 MMOL/L (ref 98–107)
CO2: 20 MMOL/L (ref 22–29)
CO2: 23 MMOL/L (ref 22–29)
CREAT SERPL-MCNC: 1.3 MG/DL (ref 0.7–1.2)
CREAT SERPL-MCNC: 1.3 MG/DL (ref 0.7–1.2)
EKG ATRIAL RATE: 46 BPM
EKG P AXIS: 92 DEGREES
EKG Q-T INTERVAL: 472 MS
EKG QRS DURATION: 188 MS
EKG QTC CALCULATION (BAZETT): 512 MS
EKG R AXIS: -79 DEGREES
EKG T AXIS: 92 DEGREES
EKG VENTRICULAR RATE: 71 BPM
EOSINOPHILS ABSOLUTE: 0.25 E9/L (ref 0.05–0.5)
EOSINOPHILS RELATIVE PERCENT: 3.9 % (ref 0–6)
GFR SERPL CREATININE-BSD FRML MDRD: 55 ML/MIN/1.73
GFR SERPL CREATININE-BSD FRML MDRD: 55 ML/MIN/1.73
GLUCOSE BLD-MCNC: 108 MG/DL (ref 74–99)
GLUCOSE BLD-MCNC: 92 MG/DL (ref 74–99)
HCT VFR BLD CALC: 43.8 % (ref 37–54)
HEMOGLOBIN: 14.4 G/DL (ref 12.5–16.5)
IMMATURE GRANULOCYTES #: 0.04 E9/L
IMMATURE GRANULOCYTES %: 0.6 % (ref 0–5)
LYMPHOCYTES ABSOLUTE: 1.22 E9/L (ref 1.5–4)
LYMPHOCYTES RELATIVE PERCENT: 18.9 % (ref 20–42)
MCH RBC QN AUTO: 32.4 PG (ref 26–35)
MCHC RBC AUTO-ENTMCNC: 32.9 % (ref 32–34.5)
MCV RBC AUTO: 98.6 FL (ref 80–99.9)
MONOCYTES ABSOLUTE: 0.48 E9/L (ref 0.1–0.95)
MONOCYTES RELATIVE PERCENT: 7.5 % (ref 2–12)
NEUTROPHILS ABSOLUTE: 4.43 E9/L (ref 1.8–7.3)
NEUTROPHILS RELATIVE PERCENT: 68.8 % (ref 43–80)
PDW BLD-RTO: 13.2 FL (ref 11.5–15)
PLATELET # BLD: 202 E9/L (ref 130–450)
PMV BLD AUTO: 11.3 FL (ref 7–12)
POTASSIUM REFLEX MAGNESIUM: 5 MMOL/L (ref 3.5–5)
POTASSIUM SERPL-SCNC: 4.5 MMOL/L (ref 3.5–5)
RBC # BLD: 4.44 E12/L (ref 3.8–5.8)
SODIUM BLD-SCNC: 132 MMOL/L (ref 132–146)
SODIUM BLD-SCNC: 136 MMOL/L (ref 132–146)
WBC # BLD: 6.4 E9/L (ref 4.5–11.5)

## 2022-11-07 PROCEDURE — 2060000000 HC ICU INTERMEDIATE R&B

## 2022-11-07 PROCEDURE — 80048 BASIC METABOLIC PNL TOTAL CA: CPT

## 2022-11-07 PROCEDURE — 85025 COMPLETE CBC W/AUTO DIFF WBC: CPT

## 2022-11-07 PROCEDURE — 93010 ELECTROCARDIOGRAM REPORT: CPT | Performed by: INTERNAL MEDICINE

## 2022-11-07 PROCEDURE — 6370000000 HC RX 637 (ALT 250 FOR IP): Performed by: STUDENT IN AN ORGANIZED HEALTH CARE EDUCATION/TRAINING PROGRAM

## 2022-11-07 PROCEDURE — 36415 COLL VENOUS BLD VENIPUNCTURE: CPT

## 2022-11-07 PROCEDURE — 6370000000 HC RX 637 (ALT 250 FOR IP): Performed by: INTERNAL MEDICINE

## 2022-11-07 PROCEDURE — 99232 SBSQ HOSP IP/OBS MODERATE 35: CPT | Performed by: STUDENT IN AN ORGANIZED HEALTH CARE EDUCATION/TRAINING PROGRAM

## 2022-11-07 PROCEDURE — 2580000003 HC RX 258: Performed by: STUDENT IN AN ORGANIZED HEALTH CARE EDUCATION/TRAINING PROGRAM

## 2022-11-07 RX ADMIN — Medication 10 ML: at 20:59

## 2022-11-07 RX ADMIN — APIXABAN 5 MG: 5 TABLET, FILM COATED ORAL at 08:28

## 2022-11-07 RX ADMIN — Medication 10 ML: at 08:29

## 2022-11-07 RX ADMIN — CETIRIZINE HYDROCHLORIDE 10 MG: 10 TABLET, FILM COATED ORAL at 08:28

## 2022-11-07 RX ADMIN — SACUBITRIL AND VALSARTAN 1 TABLET: 24; 26 TABLET, FILM COATED ORAL at 20:58

## 2022-11-07 RX ADMIN — METOPROLOL SUCCINATE 100 MG: 100 TABLET, EXTENDED RELEASE ORAL at 08:28

## 2022-11-07 RX ADMIN — APIXABAN 5 MG: 5 TABLET, FILM COATED ORAL at 20:58

## 2022-11-07 ASSESSMENT — PAIN SCALES - GENERAL
PAINLEVEL_OUTOF10: 0

## 2022-11-07 NOTE — PROGRESS NOTES
[facility] Progress Note    Admitting Date and Time: 11/4/2022  2:53 PM  Admit Dx: Other chest pain [R07.89]  Hyperkalemia [E87.5]    Subjective:  Patient is being followed for Other chest pain [R07.89]  Hyperkalemia [E87.5]   Pt reports \" is going home tomorrow, does not matter what you or cardiologist tell me. \"  Per RN: No major concerns. ROS: denies fever, chills, cp, sob, n/v, HA unless stated above.       apixaban  5 mg Oral BID    cetirizine  10 mg Oral Daily    metoprolol succinate  100 mg Oral Daily    sodium chloride flush  5-40 mL IntraVENous 2 times per day     glucose, 4 tablet, PRN  dextrose bolus, 125 mL, PRN   Or  dextrose bolus, 250 mL, PRN  glucagon (rDNA), 1 mg, PRN  dextrose, , Continuous PRN  guaiFENesin, 400 mg, BID PRN  sodium chloride flush, 5-40 mL, PRN  sodium chloride, , PRN  polyethylene glycol, 17 g, Daily PRN  acetaminophen, 650 mg, Q6H PRN   Or  acetaminophen, 650 mg, Q6H PRN         Objective:    /78   Pulse 71   Temp (!) 96.5 °F (35.8 °C) (Temporal)   Resp 16   Wt 232 lb (105.2 kg)   SpO2 96%   BMI 33.29 kg/m²     General Appearance: alert and oriented to person, place and time and in no acute distress  Skin: warm and dry  Head: normocephalic and atraumatic  Eyes: pupils equal, round, and reactive to light, extraocular eye movements intact, conjunctivae normal  Neck: neck supple and non tender without mass   Pulmonary/Chest: clear to auscultation bilaterally- no wheezes, rales or rhonchi, normal air movement, no respiratory distress  Cardiovascular: normal rate, normal S1 and S2 and no carotid bruits  Abdomen: soft, non-tender, non-distended, normal bowel sounds, no masses or organomegaly  Extremities: no cyanosis, no clubbing and no edema  Neurologic: no cranial nerve deficit and speech normal        Recent Labs     11/05/22  0309 11/06/22  0450 11/06/22  1127 11/07/22  0630    138  --  136   K 5.4* 6.2* 5.7* 5.0    107  --  105   CO2 20* 22  --  23   BUN 34* 29*  --  30*   CREATININE 1.6* 1.4*  --  1.3*   GLUCOSE 121* 99  --  92   CALCIUM 9.6 9.4  --  9.3       Recent Labs     11/04/22  1529 11/06/22  0450 11/07/22  0630   WBC 7.8 6.9 6.4   RBC 4.47 4.35 4.44   HGB 14.3 14.1 14.4   HCT 44.3 43.2 43.8   MCV 99.1 99.3 98.6   MCH 32.0 32.4 32.4   MCHC 32.3 32.6 32.9   RDW 13.2 13.2 13.2    221 202   MPV 11.3 11.5 11.3       Micro:  No components found for: [facility])    Radiology:   No results found. Assessment:    Principal Problem:    Hyperkalemia  Active Problems:    SUZIE (acute kidney injury) ([address]    Fatigue  Resolved Problems:    * No resolved hospital problems. *      Plan: SUZIE with Hyperkalemia, improving, now creatinine 1.3, K- 5.0. s/p IVfs. Hyperkalemia, possible related to the medications, on Entresto, Aldactone, Bumex. At this time remains on hold. Monitor. Chronic diarrhea, according to him has frequency of 6-7 times a day, observe. Diabetes, Accu-Cheks well controlled. Chronic mixed systolic /diastolic HF-  Echo 00/29/62- 40% EF, toprol, aldactone dced, low dose entersto restarted, decrease home dose of bumex. Untreated GINA - refuses therapy. DVT prophylaxis - Eliquis. DC planning, per cardiology rec. NOTE: This report was transcribed using voice recognition software. Every effort was made to ensure accuracy; however, inadvertent computerized transcription errors may be present.   Electronically signed by Mika Kwong MD on 11/7/2022 at 11:27 AM

## 2022-11-07 NOTE — CARE COORDINATION
11/7/2022 Social Work Discharge Planning:Pt is from home with his significant  other and they reside in a one story home. Pt follows with the [place] [facility] on [address] and gets meds through the [place]. Pt is independent with all needed DME at home. No needs. Pt drove here and will drive home at discharge. Electronically signed by JENNIFER Yuan on 11/7/2022 at 9:10 AM

## 2022-11-07 NOTE — PROGRESS NOTES
[facility] [address] progress    Name: Lisa Blackmon    Age: 80 y.o. Date of Admission: 11/4/2022  2:53 PM    Date of Service: 11/7/2022    Reason for Consultation: chronic systolic/diastolic heart failure, SUZIE    Referring Physician: Dr Jennifer Reid  Primary Care Physician: Gorge Howard DO    History of Present Illness: The patient is a 80y.o. year old male  presenting to the ED yesterday weakness and fatigue, weight loss and hyperkalemia 6.7. Was in the hospital 10/11-14/22 and discharged at a weight of 258 lbs now 232lbs. States his edema which has been chronic has almost resolved. Had not been on GDMT prior to last admission and started on entresto  and aldactone. Was hypokalemic most of his stay and discharged on supplemental K+ as well. K+ on discharge was 3. 9. Admits to eating mostly fast foods and was going out to drink daily, but since discharge apppetite has diminished. No chest pains or palpitations. History of hypertension, hyperlipidemia, permanent atrial fibrillation post AV node ablation in the past with permanent single-chamber pacemaker ( about 2.5 years to [facility]). On anticoagulation. Remote PCI of unknown vessel with no ischemia on Lexiscan June 2018. Echo done late 2019 revealed LVEF 35%, moderate mitral regurgitation and pulmonary hypertension. Echocardiogram completed September 4, 2019 revealed moderately severe left ventricular concentric hypertrophy, LVEF 35%, indeterminate diastolic function, moderate mitral regurgitation, mild tricuspid regurgitation and pulmonary hypertension. 11/7/22:  Feels better, appetite back, K+ 5. Got one dose of kayexalate  yest. Asks when he can go. Tele: V pacing.     Past Medical History:   Past Medical History:   Diagnosis Date    Alcohol abuse     Anemia     Apnea, sleep     Arthritis     Atrial fibrillation (HCC)     Atrial flutter (HCC)     CAD (coronary artery disease)     CHF (congestive heart failure) (HCC)     CKD (chronic kidney disease)     ED (erectile dysfunction)     Hearing loss     Hepatitis C     Hyperkalemia     Hypertension     Obesity     Pacemaker     Vitamin D deficiency        Review of Systems:   Constitutional: No fever, chills, sweats  Cardiac: As per HPI  Pulmonary: No cough, wheeze, hemoptysis  HEENT: No visual disturbances, difficult swallowing  GI: No nausea, vomiting, diarrhea, abdominal pain, rectal bleeding  : No dysuria or hematuria  Endocrine: No excessive thirst, heat or cold intolerance. Musculoskeletal: No joint pain or muscle aches. No claudication  Skin: No skin breakdown or rashes  Neuro: No headache, confusion, or seizures  Psych: No depression, anxiety  Allergies: Allergies   Allergen Reactions    Coumadin [Warfarin Sodium] Other (See Comments)     Excessive bleeding requiring transfusions      Heparin Other (See Comments)     Excessive bleeding requiring transfusions       Home Medications:  Prior to Admission medications    Medication Sig Start Date End Date Taking?  Authorizing Provider   metoprolol succinate (TOPROL XL) 100 MG extended release tablet Take 1 tablet by mouth daily 10/15/22   MANUEL Bhakta CNP   potassium chloride (KLOR-CON M) 20 MEQ extended release tablet Take 2 tablets by mouth daily 10/15/22   MANUEL Bhakta CNP   sacubitril-valsartan (ENTRESTO) 49-51 MG per tablet Take 1 tablet by mouth 2 times daily 10/14/22   MANUEL Bahkta CNP   spironolactone (ALDACTONE) 25 MG tablet Take 1 tablet by mouth daily 10/14/22   MANUEL Bhakta CNP   guaiFENesin 400 MG tablet Take 1 tablet by mouth 2 times daily as needed for Cough 8/15/21   Rachael Ravi DO   vitamin C (ASCORBIC ACID) 500 MG tablet Take 2 tablets by mouth daily for 10 days 11/22/20 10/11/22  MANUEL Serrato   bumetanide (BUMEX) 1 MG tablet Take 1 tablet by mouth 2 times daily 11/22/20   MANUEL Serrato   apixaban (ELIQUIS) 5 MG TABS tablet Take by mouth 2 times daily    Historical Provider, MD   cetirizine (ZYRTEC) 10 MG tablet Take 10 mg by mouth daily. Historical Provider, MD       Current Medications:  Current Facility-Administered Medications   Medication Dose Route Frequency Provider Last Rate Last Admin    glucose chewable tablet 16 g  4 tablet Oral PRN Lizette J Addicott, APRN - CNP        dextrose bolus 10% 125 mL  125 mL IntraVENous PRN Lizette J Addicott, APRN - CNP        Or    dextrose bolus 10% 250 mL  250 mL IntraVENous PRN Lizette J Addicott, APRN - CNP        glucagon (rDNA) injection 1 mg  1 mg SubCUTAneous PRN Lizette J Addicott, APRN - CNP        dextrose 10 % infusion   IntraVENous Continuous PRN Lizette J Addicott, APRN - CNP        apixaban (ELIQUIS) tablet 5 mg  5 mg Oral BID Katey Shaikh MD   5 mg at 11/06/22 2009    cetirizine (ZYRTEC) tablet 10 mg  10 mg Oral Daily Katey Shaikh MD   10 mg at 11/06/22 1040    guaiFENesin tablet 400 mg  400 mg Oral BID PRN Katey Shaikh MD   400 mg at 11/05/22 2127    metoprolol succinate (TOPROL XL) extended release tablet 100 mg  100 mg Oral Daily Katey Shaikh MD   100 mg at 11/06/22 0742    sodium chloride flush 0.9 % injection 5-40 mL  5-40 mL IntraVENous 2 times per day Katey Shaikh MD   10 mL at 11/06/22 2009    sodium chloride flush 0.9 % injection 5-40 mL  5-40 mL IntraVENous PRN Brody Solares MD        0.9 % sodium chloride infusion   IntraVENous PRN Katey Shaikh MD        polyethylene glycol (GLYCOLAX) packet 17 g  17 g Oral Daily PRN Katey Shaikh MD        acetaminophen (TYLENOL) tablet 650 mg  650 mg Oral Q6H PRN Katey Shaikh MD        Or    acetaminophen (TYLENOL) suppository 650 mg  650 mg Rectal Q6H PRN Katey Shaikh MD          dextrose      sodium chloride         Physical Exam:  /69   Pulse 73   Temp 97.7 °F (36.5 °C) (Oral)   Resp 16   Wt 232 lb (105.2 kg)   SpO2 96%   BMI 33.29 kg/m²   Weight change:    Wt Readings from Last 3 Encounters:   11/05/22 232 lb (105.2 kg) 10/14/22 258 lb (117 kg)   08/14/21 261 lb 6.4 oz (118.6 kg)     General: Awake, alert, oriented x3, no acute distress  HEENT: Normocephalic and atraumatic, extraocular movements intact, pupils equal and round, moist mucus membranes, sclera anicteric  Neck: No JVD, no carotid bruits, no thyromegaly, no adenopathy  Cardiac: Regular rate and rhythm, normal S1 and physiologically split S2, no S3, no S4. Apical impulse is nondisplaced. No murmurs, no pericardial rubs, no clicks. Carotid upstrokes brisk. Respiratory: Clear bilaterally; no wheezes, no rales, no rhonchi. Unlabored respirations  Abdomen: Soft, nontender, nondistended, bowel sounds+, no hepatomegaly, no masses, no abdominal bruits  Extremities: trace edema, no cyanosis, no clubbing. Distal pulses intact  Skin: Intact, warm and dry, no rashes, no breakdown  Musculoskeletal: normal tone and strength in the upper and lower extremities bilaterally  Neuro: No focal deficits. Moves all extremities appropriately to command. Normal sensation in the upper and lower extremities bilaterally  Psychiatric: Cooperative, and normal affect    Intake/Output:  No intake or output data in the 24 hours ending 11/07/22 0809    No intake/output data recorded.     Laboratory Tests:  Last 3 CBC:  Recent Labs     11/04/22  1529 11/06/22  0450 11/07/22  0630   WBC 7.8 6.9 6.4   RBC 4.47 4.35 4.44   HGB 14.3 14.1 14.4   HCT 44.3 43.2 43.8   MCV 99.1 99.3 98.6   MCH 32.0 32.4 32.4   MCHC 32.3 32.6 32.9   RDW 13.2 13.2 13.2    221 202   MPV 11.3 11.5 11.3       Last 3 CMP:    Recent Labs     11/04/22  1529 11/04/22  1756 11/05/22  0309 11/06/22  0450 11/06/22  1127 11/07/22  0630      < > 134 138  --  136   K 6.5*   < > 5.4* 6.2* 5.7* 5.0      < > 106 107  --  105   CO2 20*   < > 20* 22  --  23   BUN 32*   < > 34* 29*  --  30*   CREATININE 1.7*   < > 1.6* 1.4*  --  1.3*   GLUCOSE 100*   < > 121* 99  --  92   CALCIUM 9.6   < > 9.6 9.4  --  9.3   PROT 7.1  -- --  7.5  --   --    LABALBU 3.9  --   --  3.8  --   --    BILITOT 0.7  --   --  0.7  --   --    ALKPHOS 99  --   --  103  --   --    AST 22  --   --  22  --   --    ALT 22  --   --  20  --   --     < > = values in this interval not displayed. Last 3 Mag/Phos:  Recent Labs     11/04/22  1529   MG 1.7       Last 3 CK, CKMB, Troponin  No results for input(s): CKTOTAL, CKMB, TROPONINI in the last 72 hours. Last 3 Pro-BNP:  No results for input(s): PROBNP in the last 72 hours. Lab Results   Component Value Date    PROBNP 1,221 (H) 10/14/2022       Last 3 Glucose:     Recent Labs     11/05/22  0309 11/06/22  0450 11/07/22  0630   GLUCOSE 121* 99 92       Last 3 Coags:  Recent Labs     11/04/22  1529   PROTIME 13.7*   INR 1.2     Lab Results   Component Value Date/Time    PROTIME 13.7 11/04/2022 03:29 PM    PROTIME 11.4 10/31/2010 10:45 AM    INR 1.2 11/04/2022 03:29 PM       Last 3 Lipid Panel:  No results for input(s): LDLCALC, HDL, TRIG in the last 72 hours. Invalid input(s): CHLPL  Lab Results   Component Value Date    LDLCALC 42 10/12/2022    LDLCALC 59 09/04/2019     Lab Results   Component Value Date    HDL 66 10/12/2022    HDL 64 09/04/2019     Lab Results   Component Value Date    TRIG 55 10/12/2022    TRIG 119 09/04/2019     No components found for: CHLPL    TSH:  Recent Labs     11/05/22  0309   TSH 1.300     Lab Results   Component Value Date/Time    TSH 1.300 11/05/2022 03:09 AM       ABGs:  No results for input(s): PH, PO2, PCO2, HCO3, BE, O2SAT in the last 72 hours. Lactic Acid:  No results for input(s): LACTA in the last 72 hours. Radiology:  RAD Results:  XR CHEST PORTABLE   Final Result   Cardiomegaly with no acute infiltrate or effusion and no significant interval   change since the previous exam.               EKG and Telemetry:  12-lead EKG personally reviewed and shows V paced rhythm    Telemetry personally reviewed and shows v pacing        ASSESSMENT / PLAN:    1. chronic mixed systolic /diastolic HF- EF in the last 3 years 35-50% - Echo 10/12/22- 40% EF Multifactorial etiology: AF, LV function,GINA, PPM, diet/with heavy sodium intake from fast food diet. Continue  toprol as before. Spironolactone and potassium discontinued, bumex on hold. Has tolerated ARBs without potassium problem, and would be desirable to resume entresto alone, follow K+, then when discharged should recheck  within a week. 2          Afib with AV node ablation 2.5 years ago VVIR PPM followed in pacemaker clinic. 3          Anticoagulant therapy eliquis- no bleeding. 4          HTN- stable  5          CAD- stable  6          GINA- refuses therapy but certainly can contribute to HF exacerbations. 7 Hyperkalemia and SUZIE with 25 lb weight loss in 3 weeks. K+ back to 5. D/c's spironolactone and kdur . Had historically tolerated ACE/ARB, -would like to try and resume entresto while here , will check K this afternoon if remains low can retry low dose entresto and can check K+ in AM., no more spironolactone. Would decrease home dose of bumex to 1 mg a day.                  Richard Agosto MD,  [facility]  The [address] at [facility]    Electronically signed by Richard Agosto MD on 11/7/2022 at 8:09 AM

## 2022-11-08 VITALS
BODY MASS INDEX: 33.29 KG/M2 | TEMPERATURE: 96.7 F | OXYGEN SATURATION: 96 % | DIASTOLIC BLOOD PRESSURE: 67 MMHG | SYSTOLIC BLOOD PRESSURE: 116 MMHG | HEART RATE: 69 BPM | RESPIRATION RATE: 18 BRPM | WEIGHT: 232 LBS

## 2022-11-08 LAB
ANION GAP SERPL CALCULATED.3IONS-SCNC: 10 MMOL/L (ref 7–16)
BUN BLDV-MCNC: 27 MG/DL (ref 6–23)
CALCIUM SERPL-MCNC: 9.4 MG/DL (ref 8.6–10.2)
CHLORIDE BLD-SCNC: 104 MMOL/L (ref 98–107)
CO2: 24 MMOL/L (ref 22–29)
CREAT SERPL-MCNC: 1.2 MG/DL (ref 0.7–1.2)
CULTURE, STOOL: NORMAL
GFR SERPL CREATININE-BSD FRML MDRD: >60 ML/MIN/1.73
GLUCOSE BLD-MCNC: 124 MG/DL (ref 74–99)
POTASSIUM SERPL-SCNC: 4.8 MMOL/L (ref 3.5–5)
SODIUM BLD-SCNC: 138 MMOL/L (ref 132–146)

## 2022-11-08 PROCEDURE — 6370000000 HC RX 637 (ALT 250 FOR IP): Performed by: STUDENT IN AN ORGANIZED HEALTH CARE EDUCATION/TRAINING PROGRAM

## 2022-11-08 PROCEDURE — 99239 HOSP IP/OBS DSCHRG MGMT >30: CPT | Performed by: STUDENT IN AN ORGANIZED HEALTH CARE EDUCATION/TRAINING PROGRAM

## 2022-11-08 PROCEDURE — 36415 COLL VENOUS BLD VENIPUNCTURE: CPT

## 2022-11-08 PROCEDURE — 2580000003 HC RX 258: Performed by: STUDENT IN AN ORGANIZED HEALTH CARE EDUCATION/TRAINING PROGRAM

## 2022-11-08 PROCEDURE — 80048 BASIC METABOLIC PNL TOTAL CA: CPT

## 2022-11-08 PROCEDURE — 6370000000 HC RX 637 (ALT 250 FOR IP): Performed by: INTERNAL MEDICINE

## 2022-11-08 RX ORDER — BUMETANIDE 1 MG/1
1 TABLET ORAL DAILY
Qty: 30 TABLET | Refills: 1 | Status: SHIPPED | OUTPATIENT
Start: 2022-11-08

## 2022-11-08 RX ADMIN — SACUBITRIL AND VALSARTAN 1 TABLET: 24; 26 TABLET, FILM COATED ORAL at 08:53

## 2022-11-08 RX ADMIN — METOPROLOL SUCCINATE 100 MG: 100 TABLET, EXTENDED RELEASE ORAL at 08:53

## 2022-11-08 RX ADMIN — APIXABAN 5 MG: 5 TABLET, FILM COATED ORAL at 08:53

## 2022-11-08 RX ADMIN — Medication 10 ML: at 08:53

## 2022-11-08 RX ADMIN — CETIRIZINE HYDROCHLORIDE 10 MG: 10 TABLET, FILM COATED ORAL at 08:53

## 2022-11-08 ASSESSMENT — PAIN SCALES - GENERAL
PAINLEVEL_OUTOF10: 0
PAINLEVEL_OUTOF10: 0

## 2022-11-08 NOTE — PROGRESS NOTES
Discharge paperwork initiated in the computer. Care plans and educations resolved. Follow ups added and med details completed on the AVS.     Discharge instructions given to patient. All questions answered. He states he is not picking up hi prescriptions from out outpatient pharmacy because he gets them free from the [place]. . IV removed. Tele-pack removed.  Wheeled patient down to ER entrance

## 2022-11-08 NOTE — DISCHARGE SUMMARY
[facility] Physician Discharge Summary       No follow-up provider specified. Activity level: As tolerated     Dispo: Home      Condition on discharge: Stable     Patient ID:  Filiberto Alva  [id number]  42 y.o.  1940    Admit date: 11/4/2022    Discharge date and time:  11/8/2022  8:57 AM    Admission Diagnoses: Principal Problem:    Hyperkalemia  Active Problems:    SUZIE (acute kidney injury) ([address]    Fatigue  Resolved Problems:    * No resolved hospital problems. *      Discharge Diagnoses: Principal Problem:    Hyperkalemia  Active Problems:    SUZIE (acute kidney injury) ([facility]    Fatigue  Resolved Problems:    * No resolved hospital problems. *      Consults:  IP CONSULT TO CARDIOLOGY    Hospital Course:   Patient Filiberto Alva is a 80 y.o. presented with Other chest pain [R07.89]  Hyperkalemia [E87.5]  Patient was admitted and managed for SUZIE with Hyperkalemia, improving, now creatinine 1.3, K- 5.0. s/p IVfs. Hyperkalemia, possible related to the medications, on Entresto, Aldactone. At this time remains on hold/ decreased dose. Monitor OP f/u bmp. Chronic mixed systolic /diastolic HF-  Echo 49/45/00- 40% EF, toprol, aldactone dced, low dose entersto restarted, decrease home dose of bumex.     Discharge Exam:  General Appearance: alert and oriented to person, place and time and in no acute distress  Skin: warm and dry  Head: normocephalic and atraumatic  Eyes: pupils equal, round, and reactive to light, extraocular eye movements intact, conjunctivae normal  Neck: neck supple and non tender without mass   Pulmonary/Chest: clear to auscultation bilaterally- no wheezes, rales or rhonchi, normal air movement, no respiratory distress  Cardiovascular: normal rate, normal S1 and S2 and no carotid bruits  Abdomen: soft, non-tender, non-distended, normal bowel sounds, no masses or organomegaly  Extremities: no cyanosis, no clubbing and no edema  Neurologic: no cranial nerve deficit and speech normal    I/O last 3 completed shifts: In: 10 [I.V.:10]  Out: -   No intake/output data recorded. LABS:  Recent Labs     11/06/22  0450 11/06/22  1127 11/07/22  0630 11/07/22  1540     --  136 132   K 6.2* 5.7* 5.0 4.5     --  105 102   CO2 22  --  23 20*   BUN 29*  --  30* 30*   CREATININE 1.4*  --  1.3* 1.3*   GLUCOSE 99  --  92 108*   CALCIUM 9.4  --  9.3 9.2       Recent Labs     11/06/22  0450 11/07/22  0630   WBC 6.9 6.4   RBC 4.35 4.44   HGB 14.1 14.4   HCT 43.2 43.8   MCV 99.3 98.6   MCH 32.4 32.4   MCHC 32.6 32.9   RDW 13.2 13.2    202   MPV 11.5 11.3       No results for input(s): POCGLU in the last 72 hours. Imaging:  XR CHEST PORTABLE    Result Date: 11/4/2022  EXAMINATION: ONE XRAY VIEW OF THE CHEST 11/4/2022 9:48 pm COMPARISON: 11 October 2022 HISTORY: ORDERING SYSTEM PROVIDED HISTORY: dyspnea TECHNOLOGIST PROVIDED HISTORY: Reason for exam:->dyspnea FINDINGS: Single AP erect portable chest demonstrates moderate cardiomegaly with dual-chamber cardiac pacemaker in place. There is no evidence of interstitial pulmonary edema or pneumonia. There is no evidence of pneumothorax. There are bilateral reverse shoulder arthroplasties in position.      Cardiomegaly with no acute infiltrate or effusion and no significant interval change since the previous exam.       Patient Instructions:      Medication List        START taking these medications      sacubitril-valsartan 24-26 MG per tablet  Commonly known as: ENTRESTO  Take 1 tablet by mouth 2 times daily  Replaces: sacubitril-valsartan 49-51 MG per tablet            CHANGE how you take these medications      bumetanide 1 MG tablet  Commonly known as: BUMEX  Take 1 tablet by mouth daily  What changed: when to take this            CONTINUE taking these medications      cetirizine 10 MG tablet  Commonly known as: ZYRTEC     Eliquis 5 MG Tabs tablet  Generic drug: apixaban     guaiFENesin 400 MG tablet  Take 1 tablet by mouth 2 times daily as needed for Cough     metoprolol succinate 100 MG extended release tablet  Commonly known as: TOPROL XL  Take 1 tablet by mouth daily     vitamin C 500 MG tablet  Commonly known as: ASCORBIC ACID  Take 2 tablets by mouth daily for 10 days            STOP taking these medications      potassium chloride 20 MEQ extended release tablet  Commonly known as: KLOR-CON M     sacubitril-valsartan 49-51 MG per tablet  Commonly known as: ENTRESTO  Replaced by: sacubitril-valsartan 24-26 MG per tablet     spironolactone 25 MG tablet  Commonly known as: ALDACTONE               Where to Get Your Medications        These medications were sent to [address] [place] [address] [phone]  [address] [place], [place] [address] [address]      Phone: [phone]   bumetanide 1 MG tablet  sacubitril-valsartan 24-26 MG per tablet           Note that more than 30 minutes was spent in preparing discharge papers, discussing discharge with patient, medication review, etc.    Signed:  Electronically signed by Yashira Chatman MD on 11/8/2022 at 8:57 AM

## 2022-11-08 NOTE — CARE COORDINATION
11/8/2022  Social Work Discharge Planning:Pt follows with the [place] clinic on [address] and gets meds through the [place]. Pt is independent with all needed DME at home. No needs. Pt drove here and will drive home at discharge. From home with his significant other and they reside in a one story home.  Electronically signed by JENNIFER Rod on 11/8/2022 at 9:49 AM

## 2022-11-08 NOTE — PROGRESS NOTES
[facility] [address] progress    Name: Yesi Morrell    Age: 80 y.o. Date of Admission: 11/4/2022  2:53 PM    Date of Service: 11/8/2022    Reason for Consultation: chronic systolic/diastolic heart failure, SUZIE    Referring Physician: Dr Patrick Patricio  Primary Care Physician: Maxi Topete DO    History of Present Illness: The patient is a 80y.o. year old male  presenting to the ED yesterday weakness and fatigue, weight loss and hyperkalemia 6.7. Was in the hospital 10/11-14/22 and discharged at a weight of 258 lbs now 232lbs. States his edema which has been chronic has almost resolved. Had not been on GDMT prior to last admission and started on entresto  and aldactone. Was hypokalemic most of his stay and discharged on supplemental K+ as well. K+ on discharge was 3. 9. Admits to eating mostly fast foods and was going out to drink daily, but since discharge apppetite has diminished. No chest pains or palpitations. History of hypertension, hyperlipidemia, permanent atrial fibrillation post AV node ablation in the past with permanent single-chamber pacemaker ( about 2.5 years to [facility]). On anticoagulation. Remote PCI of unknown vessel with no ischemia on Lexiscan June 2018. Echo done late 2019 revealed LVEF 35%, moderate mitral regurgitation and pulmonary hypertension. Echocardiogram completed September 4, 2019 revealed moderately severe left ventricular concentric hypertrophy, LVEF 35%, indeterminate diastolic function, moderate mitral regurgitation, mild tricuspid regurgitation and pulmonary hypertension. 11/7/22:  Feels better, appetite back, K+ 5. Got one dose of kayexalate  yest. Asks when he can go. Tele: V pacing. 11/8/22  Up on side of bed. No distress. States feels good and that he insists on going home today. K+ yest 4.5 and resumed entresto.    Tele: v pacing    Past Medical History:   Past Medical History:   Diagnosis Date    Alcohol abuse     Anemia     Apnea, sleep     Arthritis     Atrial fibrillation (HCC)     Atrial flutter (HCC)     CAD (coronary artery disease)     CHF (congestive heart failure) (HCC)     CKD (chronic kidney disease)     ED (erectile dysfunction)     Hearing loss     Hepatitis C     Hyperkalemia     Hypertension     Obesity     Pacemaker     Vitamin D deficiency        Review of Systems:   Constitutional: No fever, chills, sweats  Cardiac: As per HPI  Pulmonary: No cough, wheeze, hemoptysis  HEENT: No visual disturbances, difficult swallowing  GI: No nausea, vomiting, diarrhea, abdominal pain, rectal bleeding  : No dysuria or hematuria  Endocrine: No excessive thirst, heat or cold intolerance. Musculoskeletal: No joint pain or muscle aches. No claudication  Skin: No skin breakdown or rashes  Neuro: No headache, confusion, or seizures  Psych: No depression, anxiety  Allergies: Allergies   Allergen Reactions    Coumadin [Warfarin Sodium] Other (See Comments)     Excessive bleeding requiring transfusions      Heparin Other (See Comments)     Excessive bleeding requiring transfusions       Home Medications:  Prior to Admission medications    Medication Sig Start Date End Date Taking?  Authorizing Provider   metoprolol succinate (TOPROL XL) 100 MG extended release tablet Take 1 tablet by mouth daily 10/15/22   Hannah Tong APRN - CNP   potassium chloride (KLOR-CON M) 20 MEQ extended release tablet Take 2 tablets by mouth daily 10/15/22   Hannah Tong APRN - AGNES   sacubitril-valsartan (ENTRESTO) 49-51 MG per tablet Take 1 tablet by mouth 2 times daily 10/14/22   Hannah Tong APRN - CNP   spironolactone (ALDACTONE) 25 MG tablet Take 1 tablet by mouth daily 10/14/22   Hannah Tong APRN - CNP   guaiFENesin 400 MG tablet Take 1 tablet by mouth 2 times daily as needed for Cough 8/15/21   Rachael Ravi DO   vitamin C (ASCORBIC ACID) 500 MG tablet Take 2 tablets by mouth daily for 10 days 11/22/20 10/11/22  MANUEL Valdivia - THEODORE bumetanide (BUMEX) 1 MG tablet Take 1 tablet by mouth 2 times daily 11/22/20   Johnny Ledezma APRN - CNS   apixaban (ELIQUIS) 5 MG TABS tablet Take by mouth 2 times daily    Historical Provider, MD   cetirizine (ZYRTEC) 10 MG tablet Take 10 mg by mouth daily.     Historical Provider, MD       Current Medications:  Current Facility-Administered Medications   Medication Dose Route Frequency Provider Last Rate Last Admin    sacubitril-valsartan (ENTRESTO) 24-26 MG per tablet 1 tablet  1 tablet Oral BID Elba Iyer MD   1 tablet at 11/07/22 2058    glucose chewable tablet 16 g  4 tablet Oral PRN Lizette J Addicott, APRN - CNP        dextrose bolus 10% 125 mL  125 mL IntraVENous PRN Lizette J Addicott, APRN - CNP        Or    dextrose bolus 10% 250 mL  250 mL IntraVENous PRN Lizette J Addicott, APRN - CNP        glucagon (rDNA) injection 1 mg  1 mg SubCUTAneous PRN Lizette J Addicott, APRN - CNP        dextrose 10 % infusion   IntraVENous Continuous PRN Lizette HYMAN Addicott, APRN - CNP        apixaban (ELIQUIS) tablet 5 mg  5 mg Oral BID Angelo Keenan MD   5 mg at 11/07/22 2058    cetirizine (ZYRTEC) tablet 10 mg  10 mg Oral Daily Angelo Keenan MD   10 mg at 11/07/22 9448    guaiFENesin tablet 400 mg  400 mg Oral BID PRN Angelo Keenan MD   400 mg at 11/05/22 2127    metoprolol succinate (TOPROL XL) extended release tablet 100 mg  100 mg Oral Daily Angelo Keenan MD   100 mg at 11/07/22 2734    sodium chloride flush 0.9 % injection 5-40 mL  5-40 mL IntraVENous 2 times per day Angelo Keenan MD   10 mL at 11/07/22 2059    sodium chloride flush 0.9 % injection 5-40 mL  5-40 mL IntraVENous PRN Brody Solares MD        0.9 % sodium chloride infusion   IntraVENous PRN Angelo Keenan MD        polyethylene glycol (GLYCOLAX) packet 17 g  17 g Oral Daily PRN Angelo Keenan MD        acetaminophen (TYLENOL) tablet 650 mg  650 mg Oral Q6H PRN Angelo Keenan MD        Or    acetaminophen (TYLENOL) suppository 650 mg  650 mg Rectal Q6H PRN Edmund Rader MD          dextrose      sodium chloride         Physical Exam:  /68   Pulse 70   Temp 97.4 °F (36.3 °C) (Oral)   Resp 18   Wt 232 lb (105.2 kg)   SpO2 97%   BMI 33.29 kg/m²   Weight change: Wt Readings from Last 3 Encounters:   11/05/22 232 lb (105.2 kg)   10/14/22 258 lb (117 kg)   08/14/21 261 lb 6.4 oz (118.6 kg)     General: Awake, alert, oriented x3, no acute distress  HEENT: Normocephalic and atraumatic, extraocular movements intact, pupils equal and round, moist mucus membranes, sclera anicteric  Neck: No JVD, no carotid bruits, no thyromegaly, no adenopathy  Cardiac: Regular rate and rhythm, normal S1 and physiologically split S2, no S3, no S4. Apical impulse is nondisplaced. No murmurs, no pericardial rubs, no clicks. Carotid upstrokes brisk. Respiratory: Clear bilaterally; no wheezes, no rales, no rhonchi. Unlabored respirations  Abdomen: Soft, nontender, nondistended, bowel sounds+, no hepatomegaly, no masses, no abdominal bruits  Extremities: trace edema, no cyanosis, no clubbing. Distal pulses intact  Skin: Intact, warm and dry, no rashes, no breakdown  Musculoskeletal: normal tone and strength in the upper and lower extremities bilaterally  Neuro: No focal deficits. Moves all extremities appropriately to command. Normal sensation in the upper and lower extremities bilaterally  Psychiatric: Cooperative, and normal affect    Intake/Output:    Intake/Output Summary (Last 24 hours) at 11/8/2022 0828  Last data filed at 11/7/2022 2059  Gross per 24 hour   Intake 10 ml   Output --   Net 10 ml       No intake/output data recorded.     Laboratory Tests:  Last 3 CBC:  Recent Labs     11/06/22  0450 11/07/22  0630   WBC 6.9 6.4   RBC 4.35 4.44   HGB 14.1 14.4   HCT 43.2 43.8   MCV 99.3 98.6   MCH 32.4 32.4   MCHC 32.6 32.9   RDW 13.2 13.2    202   MPV 11.5 11.3       Last 3 CMP:    Recent Labs     11/06/22  0450 11/06/22  1127 11/07/22  0630 11/07/22  1540     -- 136 132   K 6.2* 5.7* 5.0 4.5     --  105 102   CO2 22  --  23 20*   BUN 29*  --  30* 30*   CREATININE 1.4*  --  1.3* 1.3*   GLUCOSE 99  --  92 108*   CALCIUM 9.4  --  9.3 9.2   PROT 7.5  --   --   --    LABALBU 3.8  --   --   --    BILITOT 0.7  --   --   --    ALKPHOS 103  --   --   --    AST 22  --   --   --    ALT 20  --   --   --        Last 3 Mag/Phos:  No results for input(s): MG, PHOS in the last 72 hours. Last 3 CK, CKMB, Troponin  No results for input(s): CKTOTAL, CKMB, TROPONINI in the last 72 hours. Last 3 Pro-BNP:  No results for input(s): PROBNP in the last 72 hours. Lab Results   Component Value Date    PROBNP 1,221 (H) 10/14/2022       Last 3 Glucose:     Recent Labs     11/06/22  0450 11/07/22  0630 11/07/22  1540   GLUCOSE 99 92 108*       Last 3 Coags:  No results for input(s): PROTIME, INR, PTT in the last 72 hours. Lab Results   Component Value Date/Time    PROTIME 13.7 11/04/2022 03:29 PM    PROTIME 11.4 10/31/2010 10:45 AM    INR 1.2 11/04/2022 03:29 PM       Last 3 Lipid Panel:  No results for input(s): LDLCALC, HDL, TRIG in the last 72 hours. Invalid input(s): CHLPL  Lab Results   Component Value Date    LDLCALC 42 10/12/2022    LDLCALC 59 09/04/2019     Lab Results   Component Value Date    HDL 66 10/12/2022    HDL 64 09/04/2019     Lab Results   Component Value Date    TRIG 55 10/12/2022    TRIG 119 09/04/2019     No components found for: CHLPL    TSH:  No results for input(s): TSH in the last 72 hours. Lab Results   Component Value Date/Time    TSH 1.300 11/05/2022 03:09 AM       ABGs:  No results for input(s): PH, PO2, PCO2, HCO3, BE, O2SAT in the last 72 hours. Lactic Acid:  No results for input(s): LACTA in the last 72 hours.       Radiology:  RAD Results:  XR CHEST PORTABLE   Final Result   Cardiomegaly with no acute infiltrate or effusion and no significant interval   change since the previous exam.               EKG and Telemetry:  12-lead EKG personally reviewed and shows V paced rhythm    Telemetry personally reviewed and shows v pacing        ASSESSMENT / PLAN:    1.          chronic mixed systolic /diastolic HF- EF in the last 3 years 35-50% - Echo 10/12/22- 40% EF Multifactorial etiology: AF, LV function,GINA, PPM, diet/with heavy sodium intake from fast food diet. Continue  toprol as before. Spironolactone and potassium discontinued, bumex on hold. Has tolerated ARBs without potassium problem, and would be desirable to resume entresto alone, follow K+, then when discharged should recheck  within a week. 2          Afib with AV node ablation 2.5 years ago VVIR PPM followed in pacemaker clinic. 3          Anticoagulant therapy eliquis- no bleeding. 4          HTN- stable  5          CAD- stable  6          GINA- refuses therapy but certainly can contribute to HF exacerbations. 7 Hyperkalemia and SUZIE with 25 lb weight loss in 3 weeks. K+ back to 4.5. D/c'd spironolactone and kdur . Had historically tolerated ACE/ARB, - resumed entresto  check K+ pending, no more spironolactone. Would decrease home dose of bumex to 1 mg a day. Otherwise ok to go home and has appt next week at [facility] he states.          Guillermo Mckeon MD,  [facility]  The [address] at [facility]    Electronically signed by Guillermo Mckeon MD on 11/8/2022 at 8:28 AM

## 2022-11-08 NOTE — PLAN OF CARE
Problem: Discharge Planning  Goal: Discharge to home or other facility with appropriate resources  Outcome: Progressing     Problem: ABCDS Injury Assessment  Goal: Absence of physical injury  11/8/2022 0009 by Saniya Patel RN  Outcome: Progressing  11/7/2022 1317 by Oxana Dunn RN  Outcome: Progressing     Problem: Chronic Conditions and Co-morbidities  Goal: Patient's chronic conditions and co-morbidity symptoms are monitored and maintained or improved  11/8/2022 0009 by Saniya Patel RN  Outcome: Progressing  11/7/2022 1317 by Oxana Dunn RN  Outcome: Progressing     Problem: Safety - Adult  Goal: Free from fall injury  11/8/2022 0009 by Saniya Patel RN  Outcome: Progressing  11/7/2022 1317 by Oxana Dunn RN  Outcome: Progressing

## 2023-06-23 ENCOUNTER — APPOINTMENT (OUTPATIENT)
Dept: CT IMAGING | Age: 83
End: 2023-06-23
Attending: EMERGENCY MEDICINE
Payer: MEDICARE

## 2023-06-23 ENCOUNTER — HOSPITAL ENCOUNTER (EMERGENCY)
Age: 83
Discharge: HOME | End: 2023-06-23
Attending: EMERGENCY MEDICINE
Payer: MEDICARE

## 2023-06-23 VITALS
SYSTOLIC BLOOD PRESSURE: 139 MMHG | HEIGHT: 71 IN | WEIGHT: 250 LBS | TEMPERATURE: 98.2 F | HEART RATE: 70 BPM | DIASTOLIC BLOOD PRESSURE: 78 MMHG | OXYGEN SATURATION: 98 % | BODY MASS INDEX: 35 KG/M2 | RESPIRATION RATE: 17 BRPM

## 2023-06-23 DIAGNOSIS — S12.100A CLOSED ODONTOID FRACTURE, INITIAL ENCOUNTER (HCC): ICD-10-CM

## 2023-06-23 DIAGNOSIS — S12.031A CLOSED NONDISPLACED FRACTURE OF POSTERIOR ARCH OF FIRST CERVICAL VERTEBRA, INITIAL ENCOUNTER (HCC): Primary | ICD-10-CM

## 2023-06-23 PROCEDURE — 70450 CT HEAD/BRAIN W/O DYE: CPT

## 2023-06-23 PROCEDURE — 99284 EMERGENCY DEPT VISIT MOD MDM: CPT

## 2023-06-23 PROCEDURE — 96374 THER/PROPH/DIAG INJ IV PUSH: CPT

## 2023-06-23 PROCEDURE — 6360000002 HC RX W HCPCS: Performed by: EMERGENCY MEDICINE

## 2023-06-23 RX ORDER — MORPHINE SULFATE 4 MG/ML
4 INJECTION, SOLUTION INTRAMUSCULAR; INTRAVENOUS ONCE
Status: COMPLETED | OUTPATIENT
Start: 2023-06-23 | End: 2023-06-23

## 2023-06-23 RX ADMIN — MORPHINE SULFATE 4 MG: 4 INJECTION, SOLUTION INTRAMUSCULAR; INTRAVENOUS at 14:34

## 2023-06-23 ASSESSMENT — PAIN DESCRIPTION - LOCATION
LOCATION: NECK
LOCATION: NECK

## 2023-06-23 ASSESSMENT — PAIN DESCRIPTION - DESCRIPTORS: DESCRIPTORS: THROBBING

## 2023-06-23 ASSESSMENT — PAIN - FUNCTIONAL ASSESSMENT: PAIN_FUNCTIONAL_ASSESSMENT: 0-10

## 2023-06-23 ASSESSMENT — PAIN SCALES - GENERAL
PAINLEVEL_OUTOF10: 10
PAINLEVEL_OUTOF10: 10

## 2023-06-23 NOTE — ED NOTES
Pt requesting to use restroom, staff offered pt bedpan. Pt refused bedpan stating if he can not walk to restroom he is leaving.  notified. Ok to walk pt to restroom.      Nell Cook RN  06/23/23 5109

## 2023-06-23 NOTE — ED PROVIDER NOTES
Department of Emergency Medicine   ED  Provider Note  Admit Date/RoomTime: 2023 12:46 PM  ED Room: /          Written by: Mauro Timmons DO  Patient Name: Froy Dumont  Attending Provider: No att. providers found  Admit Date: 2023 12:46 PM  MRN: [id number]    : 1940      History of Present Illness:  23, Time: 2:52 PM EDT  Chief Complaint   Patient presents with    Neck Injury     Pt fell 2 months ago face first and had mri at [facility] today and showed C1 fracture. Aaox4. C/o neck pain. HPI   Patient is a 80 y.o. male with a past medical history of CHF, atrial fibrillation, hypertension, presenting to the Emergency Department for neck pain and abnormal CT. History obtained by patient and daughter. Patient has had neck pain x2 months. He states he had a fall that was mechanical in nature. Because of this he subsequently hit his head. He states he has been having neck pain since that fall. It is not worsened but he states it has not gotten any better. He saw his PCP about this recently. They subsequently ordered a CT of his cervical spine. He had a CT of the cervical spine obtained today which subsequently demonstrated a C1 as well as C2 fracture and he was brought to the ER by EMS. He denies any numbness, tingling or weakness. He denies any falls since that time 2 months prior.   He denies any difficulty with speech, swallowing, blurry vision, changes in vision, numbness, tingling or weakness in his upper or lower extremities      Review of Systems:   A complete review of systems was performed and pertinent positives and negatives are stated within HPI, all other systems reviewed and are negative.        --------------------------------------------- PAST HISTORY ---------------------------------------------  Past Medical History:  has a past medical history of Alcohol abuse, Anemia, Apnea, sleep, Arthritis, Atrial fibrillation ([address] Atrial flutter ([address] CAD

## 2023-06-23 NOTE — DISCHARGE INSTRUCTIONS
Wear your cervical collar at all times. Call neurosurgery to follow-up and have a repeat evaluation. Return to emergency room immediately for new, worsening or changing symptoms including not limited to numbness, tingling, weakness difficulty with vision, speech, new or worsening pain.

## 2023-07-21 ENCOUNTER — OFFICE VISIT (OUTPATIENT)
Dept: NEUROSURGERY | Age: 83
End: 2023-07-21
Payer: MEDICARE

## 2023-07-21 VITALS
HEIGHT: 71 IN | SYSTOLIC BLOOD PRESSURE: 114 MMHG | TEMPERATURE: 97.2 F | WEIGHT: 250 LBS | DIASTOLIC BLOOD PRESSURE: 81 MMHG | OXYGEN SATURATION: 94 % | BODY MASS INDEX: 35 KG/M2 | HEART RATE: 86 BPM

## 2023-07-21 DIAGNOSIS — S12.100A CLOSED ODONTOID FRACTURE, INITIAL ENCOUNTER (HCC): Primary | ICD-10-CM

## 2023-07-21 PROCEDURE — 1036F TOBACCO NON-USER: CPT | Performed by: NEUROLOGICAL SURGERY

## 2023-07-21 PROCEDURE — 1123F ACP DISCUSS/DSCN MKR DOCD: CPT | Performed by: NEUROLOGICAL SURGERY

## 2023-07-21 PROCEDURE — 3078F DIAST BP <80 MM HG: CPT | Performed by: NEUROLOGICAL SURGERY

## 2023-07-21 PROCEDURE — 99204 OFFICE O/P NEW MOD 45 MIN: CPT | Performed by: NEUROLOGICAL SURGERY

## 2023-07-21 PROCEDURE — G8427 DOCREV CUR MEDS BY ELIG CLIN: HCPCS | Performed by: NEUROLOGICAL SURGERY

## 2023-07-21 PROCEDURE — G8417 CALC BMI ABV UP PARAM F/U: HCPCS | Performed by: NEUROLOGICAL SURGERY

## 2023-07-21 PROCEDURE — 3074F SYST BP LT 130 MM HG: CPT | Performed by: NEUROLOGICAL SURGERY

## 2023-07-21 NOTE — PROGRESS NOTES
[id number] [facility] NEUROSURGERY  [address]  [phone]       Chief Complaint:   Chief Complaint   Patient presents with    Follow-Up from Hospital     1 month Hospital follow up   Patient states he is in a lot of pain today, level 10  States took off his brace off yesterday for a little bit       HPI:     I had the pleasure of seeing Donn Marcelino today in neurosurgical clinic. As you know, this 80year old man presents with cervicalgia for two months after a remote fall getting out of the shower. He was seen and evaluated by his PCP and CT of the c spine was obtained showing a C1 and C2 fx. In our ER, he was ordered a brace with strict instructions. Unfortunately, he has been limited in his compliance. Today he endorses neck pain, no radiculopathy. He denies any numbness, weakness or tingling. There has been no loss of bowel or bladder.     Past Medical History:   Diagnosis Date    Alcohol abuse     Anemia     Apnea, sleep     Arthritis     Atrial fibrillation (HCC)     Atrial flutter (HCC)     CAD (coronary artery disease)     CHF (congestive heart failure) (HCC)     CKD (chronic kidney disease)     ED (erectile dysfunction)     Hearing loss     Hepatitis C     Hyperkalemia     Hypertension     Obesity     Pacemaker     Vitamin D deficiency      Past Surgical History:   Procedure Laterality Date    ABLATION OF DYSRHYTHMIC FOCUS      x4    FINGER AMPUTATION      x3 right hand    HERNIA REPAIR      SHOULDER SURGERY      total replacement bilat      Family History   Problem Relation Age of Onset    Heart Attack Father     Heart Attack Paternal Aunt     Heart Attack Paternal Uncle     Heart Attack Paternal Cousin       Social History     Socioeconomic History    Marital status:      Spouse name: Not on file    Number of children: Not on file    Years of education: Not on file    Highest education level: Not on

## 2023-07-26 ENCOUNTER — HOSPITAL ENCOUNTER (INPATIENT)
Age: 83
LOS: 4 days | Discharge: HOME | DRG: 291 | End: 2023-07-30
Attending: EMERGENCY MEDICINE | Admitting: INTERNAL MEDICINE
Payer: MEDICARE

## 2023-07-26 ENCOUNTER — APPOINTMENT (OUTPATIENT)
Dept: GENERAL RADIOLOGY | Age: 83
DRG: 291 | End: 2023-07-26
Payer: MEDICARE

## 2023-07-26 DIAGNOSIS — R06.02 SHORTNESS OF BREATH: ICD-10-CM

## 2023-07-26 DIAGNOSIS — I50.9 ACUTE ON CHRONIC CONGESTIVE HEART FAILURE, UNSPECIFIED HEART FAILURE TYPE (HCC): Primary | ICD-10-CM

## 2023-07-26 LAB
ALBUMIN SERPL-MCNC: 4.1 G/DL (ref 3.5–5.2)
ALP SERPL-CCNC: 82 U/L (ref 40–129)
ALT SERPL-CCNC: 12 U/L (ref 0–40)
ANION GAP SERPL CALCULATED.3IONS-SCNC: 10 MMOL/L (ref 7–16)
AST SERPL-CCNC: 18 U/L (ref 0–39)
BASOPHILS # BLD: 0.01 K/UL (ref 0–0.2)
BASOPHILS NFR BLD: 0 % (ref 0–2)
BILIRUB SERPL-MCNC: 0.7 MG/DL (ref 0–1.2)
BNP SERPL-MCNC: 900 PG/ML (ref 0–450)
BUN SERPL-MCNC: 25 MG/DL (ref 6–23)
CALCIUM SERPL-MCNC: 9 MG/DL (ref 8.6–10.2)
CHLORIDE SERPL-SCNC: 107 MMOL/L (ref 98–107)
CO2 SERPL-SCNC: 29 MMOL/L (ref 22–29)
CREAT SERPL-MCNC: 1.2 MG/DL (ref 0.7–1.2)
EOSINOPHIL # BLD: 0.22 K/UL (ref 0.05–0.5)
EOSINOPHILS RELATIVE PERCENT: 4 % (ref 0–6)
ERYTHROCYTE [DISTWIDTH] IN BLOOD BY AUTOMATED COUNT: 13.2 % (ref 11.5–15)
GFR SERPL CREATININE-BSD FRML MDRD: 59 ML/MIN/1.73M2
GLUCOSE SERPL-MCNC: 104 MG/DL (ref 74–99)
HCT VFR BLD AUTO: 41 % (ref 37–54)
HGB BLD-MCNC: 13.7 G/DL (ref 12.5–16.5)
IMM GRANULOCYTES # BLD AUTO: <0.03 K/UL (ref 0–0.58)
IMM GRANULOCYTES NFR BLD: 0 % (ref 0–5)
LYMPHOCYTES NFR BLD: 1.04 K/UL (ref 1.5–4)
LYMPHOCYTES RELATIVE PERCENT: 19 % (ref 20–42)
MCH RBC QN AUTO: 33.4 PG (ref 26–35)
MCHC RBC AUTO-ENTMCNC: 33.4 G/DL (ref 32–34.5)
MCV RBC AUTO: 100 FL (ref 80–99.9)
MONOCYTES NFR BLD: 0.39 K/UL (ref 0.1–0.95)
MONOCYTES NFR BLD: 7 % (ref 2–12)
NEUTROPHILS NFR BLD: 70 % (ref 43–80)
NEUTS SEG NFR BLD: 3.9 K/UL (ref 1.8–7.3)
PLATELET # BLD AUTO: 143 K/UL (ref 130–450)
PMV BLD AUTO: 11.5 FL (ref 7–12)
POTASSIUM SERPL-SCNC: 4 MMOL/L (ref 3.5–5)
PROT SERPL-MCNC: 6.6 G/DL (ref 6.4–8.3)
RBC # BLD AUTO: 4.1 M/UL (ref 3.8–5.8)
SODIUM SERPL-SCNC: 146 MMOL/L (ref 132–146)
TROPONIN I SERPL HS-MCNC: 20 NG/L (ref 0–11)
TROPONIN I SERPL HS-MCNC: 22 NG/L (ref 0–11)
WBC OTHER # BLD: 5.6 K/UL (ref 4.5–11.5)

## 2023-07-26 PROCEDURE — 2060000000 HC ICU INTERMEDIATE R&B

## 2023-07-26 PROCEDURE — 80053 COMPREHEN METABOLIC PANEL: CPT

## 2023-07-26 PROCEDURE — 2500000003 HC RX 250 WO HCPCS: Performed by: INTERNAL MEDICINE

## 2023-07-26 PROCEDURE — 2580000003 HC RX 258: Performed by: INTERNAL MEDICINE

## 2023-07-26 PROCEDURE — 84484 ASSAY OF TROPONIN QUANT: CPT

## 2023-07-26 PROCEDURE — 6370000000 HC RX 637 (ALT 250 FOR IP): Performed by: INTERNAL MEDICINE

## 2023-07-26 PROCEDURE — 99222 1ST HOSP IP/OBS MODERATE 55: CPT | Performed by: INTERNAL MEDICINE

## 2023-07-26 PROCEDURE — 96374 THER/PROPH/DIAG INJ IV PUSH: CPT

## 2023-07-26 PROCEDURE — 99285 EMERGENCY DEPT VISIT HI MDM: CPT

## 2023-07-26 PROCEDURE — 6370000000 HC RX 637 (ALT 250 FOR IP)

## 2023-07-26 PROCEDURE — 83735 ASSAY OF MAGNESIUM: CPT

## 2023-07-26 PROCEDURE — 93005 ELECTROCARDIOGRAM TRACING: CPT | Performed by: EMERGENCY MEDICINE

## 2023-07-26 PROCEDURE — 2500000003 HC RX 250 WO HCPCS: Performed by: EMERGENCY MEDICINE

## 2023-07-26 PROCEDURE — 85027 COMPLETE CBC AUTOMATED: CPT

## 2023-07-26 PROCEDURE — 83880 ASSAY OF NATRIURETIC PEPTIDE: CPT

## 2023-07-26 PROCEDURE — 71046 X-RAY EXAM CHEST 2 VIEWS: CPT

## 2023-07-26 RX ORDER — SODIUM CHLORIDE 0.9 % (FLUSH) 0.9 %
5-40 SYRINGE (ML) INJECTION EVERY 12 HOURS SCHEDULED
Status: DISCONTINUED | OUTPATIENT
Start: 2023-07-26 | End: 2023-07-30 | Stop reason: HOSPADM

## 2023-07-26 RX ORDER — POLYETHYLENE GLYCOL 3350 17 G/17G
17 POWDER, FOR SOLUTION ORAL DAILY PRN
Status: DISCONTINUED | OUTPATIENT
Start: 2023-07-26 | End: 2023-07-30 | Stop reason: HOSPADM

## 2023-07-26 RX ORDER — BUMETANIDE 0.25 MG/ML
1 INJECTION INTRAMUSCULAR; INTRAVENOUS ONCE
Status: COMPLETED | OUTPATIENT
Start: 2023-07-26 | End: 2023-07-26

## 2023-07-26 RX ORDER — SODIUM CHLORIDE 9 MG/ML
INJECTION, SOLUTION INTRAVENOUS PRN
Status: DISCONTINUED | OUTPATIENT
Start: 2023-07-26 | End: 2023-07-30 | Stop reason: HOSPADM

## 2023-07-26 RX ORDER — METOPROLOL SUCCINATE 100 MG/1
100 TABLET, EXTENDED RELEASE ORAL DAILY
Status: DISCONTINUED | OUTPATIENT
Start: 2023-07-26 | End: 2023-07-30 | Stop reason: HOSPADM

## 2023-07-26 RX ORDER — ONDANSETRON 2 MG/ML
4 INJECTION INTRAMUSCULAR; INTRAVENOUS EVERY 6 HOURS PRN
Status: DISCONTINUED | OUTPATIENT
Start: 2023-07-26 | End: 2023-07-26

## 2023-07-26 RX ORDER — HYDROCODONE BITARTRATE AND ACETAMINOPHEN 5; 325 MG/1; MG/1
1 TABLET ORAL EVERY 6 HOURS PRN
Status: DISCONTINUED | OUTPATIENT
Start: 2023-07-26 | End: 2023-07-30 | Stop reason: HOSPADM

## 2023-07-26 RX ORDER — ACETAMINOPHEN 650 MG/1
650 SUPPOSITORY RECTAL EVERY 6 HOURS PRN
Status: DISCONTINUED | OUTPATIENT
Start: 2023-07-26 | End: 2023-07-30 | Stop reason: HOSPADM

## 2023-07-26 RX ORDER — CETIRIZINE HYDROCHLORIDE 10 MG/1
5 TABLET ORAL DAILY
Status: DISCONTINUED | OUTPATIENT
Start: 2023-07-26 | End: 2023-07-30 | Stop reason: HOSPADM

## 2023-07-26 RX ORDER — SODIUM CHLORIDE 0.9 % (FLUSH) 0.9 %
5-40 SYRINGE (ML) INJECTION PRN
Status: DISCONTINUED | OUTPATIENT
Start: 2023-07-26 | End: 2023-07-30 | Stop reason: HOSPADM

## 2023-07-26 RX ORDER — TRAZODONE HYDROCHLORIDE 50 MG/1
25 TABLET ORAL NIGHTLY
Status: DISCONTINUED | OUTPATIENT
Start: 2023-07-26 | End: 2023-07-28

## 2023-07-26 RX ORDER — TRAZODONE HYDROCHLORIDE 50 MG/1
50 TABLET ORAL NIGHTLY
Status: DISCONTINUED | OUTPATIENT
Start: 2023-07-26 | End: 2023-07-26

## 2023-07-26 RX ORDER — ACETAMINOPHEN 325 MG/1
650 TABLET ORAL EVERY 6 HOURS PRN
Status: DISCONTINUED | OUTPATIENT
Start: 2023-07-26 | End: 2023-07-30 | Stop reason: HOSPADM

## 2023-07-26 RX ORDER — ONDANSETRON 4 MG/1
4 TABLET, ORALLY DISINTEGRATING ORAL EVERY 8 HOURS PRN
Status: DISCONTINUED | OUTPATIENT
Start: 2023-07-26 | End: 2023-07-26

## 2023-07-26 RX ORDER — BUMETANIDE 0.25 MG/ML
1 INJECTION INTRAMUSCULAR; INTRAVENOUS 2 TIMES DAILY
Status: DISCONTINUED | OUTPATIENT
Start: 2023-07-26 | End: 2023-07-30

## 2023-07-26 RX ADMIN — SACUBITRIL AND VALSARTAN 1 TABLET: 24; 26 TABLET, FILM COATED ORAL at 21:18

## 2023-07-26 RX ADMIN — CETIRIZINE HYDROCHLORIDE 5 MG: 10 TABLET, FILM COATED ORAL at 21:18

## 2023-07-26 RX ADMIN — HYDROCODONE BITARTRATE AND ACETAMINOPHEN 1 TABLET: 5; 325 TABLET ORAL at 23:08

## 2023-07-26 RX ADMIN — APIXABAN 5 MG: 5 TABLET, FILM COATED ORAL at 21:18

## 2023-07-26 RX ADMIN — BUMETANIDE 1 MG: 0.25 INJECTION INTRAMUSCULAR; INTRAVENOUS at 17:06

## 2023-07-26 RX ADMIN — TRAZODONE HYDROCHLORIDE 25 MG: 50 TABLET ORAL at 23:09

## 2023-07-26 RX ADMIN — SODIUM CHLORIDE, PRESERVATIVE FREE 10 ML: 5 INJECTION INTRAVENOUS at 21:19

## 2023-07-26 RX ADMIN — BUMETANIDE 1 MG: 0.25 INJECTION INTRAMUSCULAR; INTRAVENOUS at 21:19

## 2023-07-26 RX ADMIN — METOPROLOL SUCCINATE 100 MG: 100 TABLET, EXTENDED RELEASE ORAL at 21:18

## 2023-07-26 ASSESSMENT — PAIN DESCRIPTION - LOCATION
LOCATION: LEG
LOCATION: LEG;ANKLE
LOCATION: ANKLE;LEG

## 2023-07-26 ASSESSMENT — PAIN - FUNCTIONAL ASSESSMENT
PAIN_FUNCTIONAL_ASSESSMENT: 0-10
PAIN_FUNCTIONAL_ASSESSMENT: 0-10

## 2023-07-26 ASSESSMENT — ENCOUNTER SYMPTOMS
NAUSEA: 0
VOMITING: 0
SHORTNESS OF BREATH: 1
ABDOMINAL PAIN: 0
EYE REDNESS: 0

## 2023-07-26 ASSESSMENT — PAIN DESCRIPTION - ORIENTATION
ORIENTATION: LEFT;RIGHT
ORIENTATION: RIGHT;LEFT
ORIENTATION: RIGHT;LEFT

## 2023-07-26 ASSESSMENT — PAIN DESCRIPTION - DESCRIPTORS
DESCRIPTORS: HEAVINESS
DESCRIPTORS: HEAVINESS
DESCRIPTORS: HEAVINESS;ACHING

## 2023-07-26 ASSESSMENT — PAIN SCALES - GENERAL
PAINLEVEL_OUTOF10: 5
PAINLEVEL_OUTOF10: 0
PAINLEVEL_OUTOF10: 4
PAINLEVEL_OUTOF10: 5
PAINLEVEL_OUTOF10: 8
PAINLEVEL_OUTOF10: 8

## 2023-07-26 ASSESSMENT — LIFESTYLE VARIABLES
HOW OFTEN DO YOU HAVE A DRINK CONTAINING ALCOHOL: 2-3 TIMES A WEEK
HOW MANY STANDARD DRINKS CONTAINING ALCOHOL DO YOU HAVE ON A TYPICAL DAY: 3 OR 4

## 2023-07-26 NOTE — ED NOTES
Observed run of 7 beats VTACH on patient monitor. Assessed patient. Asymptomatic and without complaint. VSS. Dr. Neena Caputo notified. No orders received.      Thomas Escobar RN  07/26/23 2084

## 2023-07-26 NOTE — H&P
[facility] History and Physical          ASSESSMENT:      Principal Problem:    CHF (congestive heart failure), NYHA class I, acute on chronic, combined ([address])  Resolved Problems:    * No resolved hospital problems. *      PLAN:    1. Acute on chronic systolic and diastolic congestive heart failure with volume overload    Patient seems to have a great deal of peripheral edema and he believes edema in his tissues but lungs are clear for me, he is on room air. We will start patient on Bumex 1 mg IV twice daily. His normal Bumex dose is 1 mg p.o. daily. We will consult patient's cardiologist as well to help guide therapy. His EKG shows only V pacing but there does not seem to be an ischemic heart disease component to this. Continue Entresto and Toprol-XL and follow chemistries closely as well as blood pressure. 2.   History of A-fib  Continue Eliquis and Toprol-XL. As above, EKG with V pacing. Code Status: DNR-CCA  DVT prophylaxis: on eliquis    CHIEF COMPLAINT:   Fluid overload    History of Present Illness:    35-year-old man with history of both systolic and diastolic CHF on Entresto, atrial fibrillation, pacemaker, hypertension. Patient presents in a c-collar and tells me that he fell in the bathroom and has high neck fractures. Patient presents with a very large weight gain over the last 5 days and feels fluid overloaded. He has a great deal lower extremity edema. Chest x-ray shows mild pulmonary edema but BNP is only 900 which is the lowest value I can see for him since 2019. Troponins mildly elevated at 22 and 20 which again are the lowest values he has had since 2021. He reports no chest pain or pressure, and review of systems is otherwise negative including no fever or chills, no recent cough or cold type symptoms, no abdominal pain, no nausea vomiting or diarrhea.   Informant(s) for H&P: Patient    REVIEW OF SYSTEMS:  A comprehensive review of systems

## 2023-07-27 PROBLEM — I10 PRIMARY HYPERTENSION: Status: ACTIVE | Noted: 2023-07-27

## 2023-07-27 PROBLEM — I50.43 ACUTE ON CHRONIC COMBINED SYSTOLIC (CONGESTIVE) AND DIASTOLIC (CONGESTIVE) HEART FAILURE (HCC): Status: ACTIVE | Noted: 2023-07-27

## 2023-07-27 PROBLEM — R73.9 HYPERGLYCEMIA: Status: ACTIVE | Noted: 2023-07-27

## 2023-07-27 LAB
ANION GAP SERPL CALCULATED.3IONS-SCNC: 11 MMOL/L (ref 7–16)
ANION GAP SERPL CALCULATED.3IONS-SCNC: 12 MMOL/L (ref 7–16)
BASOPHILS # BLD: 0.02 K/UL (ref 0–0.2)
BASOPHILS NFR BLD: 0 % (ref 0–2)
BUN SERPL-MCNC: 26 MG/DL (ref 6–23)
BUN SERPL-MCNC: 27 MG/DL (ref 6–23)
CALCIUM SERPL-MCNC: 8.9 MG/DL (ref 8.6–10.2)
CALCIUM SERPL-MCNC: 8.9 MG/DL (ref 8.6–10.2)
CHLORIDE SERPL-SCNC: 104 MMOL/L (ref 98–107)
CHLORIDE SERPL-SCNC: 105 MMOL/L (ref 98–107)
CO2 SERPL-SCNC: 26 MMOL/L (ref 22–29)
CO2 SERPL-SCNC: 27 MMOL/L (ref 22–29)
CREAT SERPL-MCNC: 1.3 MG/DL (ref 0.7–1.2)
CREAT SERPL-MCNC: 1.4 MG/DL (ref 0.7–1.2)
EKG ATRIAL RATE: 42 BPM
EKG Q-T INTERVAL: 482 MS
EKG QRS DURATION: 192 MS
EKG QTC CALCULATION (BAZETT): 527 MS
EKG R AXIS: -76 DEGREES
EKG T AXIS: 84 DEGREES
EKG VENTRICULAR RATE: 72 BPM
EOSINOPHIL # BLD: 0.25 K/UL (ref 0.05–0.5)
EOSINOPHILS RELATIVE PERCENT: 5 % (ref 0–6)
ERYTHROCYTE [DISTWIDTH] IN BLOOD BY AUTOMATED COUNT: 13.1 % (ref 11.5–15)
GFR SERPL CREATININE-BSD FRML MDRD: 49 ML/MIN/1.73M2
GFR SERPL CREATININE-BSD FRML MDRD: 54 ML/MIN/1.73M2
GLUCOSE SERPL-MCNC: 111 MG/DL (ref 74–99)
GLUCOSE SERPL-MCNC: 121 MG/DL (ref 74–99)
HCT VFR BLD AUTO: 40.7 % (ref 37–54)
HGB BLD-MCNC: 13.4 G/DL (ref 12.5–16.5)
IMM GRANULOCYTES # BLD AUTO: <0.03 K/UL (ref 0–0.58)
IMM GRANULOCYTES NFR BLD: 0 % (ref 0–5)
LYMPHOCYTES NFR BLD: 1.55 K/UL (ref 1.5–4)
LYMPHOCYTES RELATIVE PERCENT: 28 % (ref 20–42)
MAGNESIUM SERPL-MCNC: 1.6 MG/DL (ref 1.6–2.6)
MCH RBC QN AUTO: 33.3 PG (ref 26–35)
MCHC RBC AUTO-ENTMCNC: 32.9 G/DL (ref 32–34.5)
MCV RBC AUTO: 101.2 FL (ref 80–99.9)
MONOCYTES NFR BLD: 0.49 K/UL (ref 0.1–0.95)
MONOCYTES NFR BLD: 9 % (ref 2–12)
NEUTROPHILS NFR BLD: 58 % (ref 43–80)
NEUTS SEG NFR BLD: 3.17 K/UL (ref 1.8–7.3)
PLATELET # BLD AUTO: 141 K/UL (ref 130–450)
PMV BLD AUTO: 11.9 FL (ref 7–12)
POTASSIUM SERPL-SCNC: 3.7 MMOL/L (ref 3.5–5)
POTASSIUM SERPL-SCNC: 3.9 MMOL/L (ref 3.5–5)
RBC # BLD AUTO: 4.02 M/UL (ref 3.8–5.8)
SODIUM SERPL-SCNC: 142 MMOL/L (ref 132–146)
SODIUM SERPL-SCNC: 143 MMOL/L (ref 132–146)
WBC OTHER # BLD: 5.5 K/UL (ref 4.5–11.5)

## 2023-07-27 PROCEDURE — 2580000003 HC RX 258: Performed by: INTERNAL MEDICINE

## 2023-07-27 PROCEDURE — 80048 BASIC METABOLIC PNL TOTAL CA: CPT

## 2023-07-27 PROCEDURE — 99232 SBSQ HOSP IP/OBS MODERATE 35: CPT | Performed by: INTERNAL MEDICINE

## 2023-07-27 PROCEDURE — 6370000000 HC RX 637 (ALT 250 FOR IP): Performed by: INTERNAL MEDICINE

## 2023-07-27 PROCEDURE — 2060000000 HC ICU INTERMEDIATE R&B

## 2023-07-27 PROCEDURE — 6370000000 HC RX 637 (ALT 250 FOR IP)

## 2023-07-27 PROCEDURE — 2500000003 HC RX 250 WO HCPCS: Performed by: INTERNAL MEDICINE

## 2023-07-27 RX ORDER — TRAZODONE HYDROCHLORIDE 50 MG/1
25 TABLET ORAL ONCE
Status: COMPLETED | OUTPATIENT
Start: 2023-07-28 | End: 2023-07-28

## 2023-07-27 RX ADMIN — SACUBITRIL AND VALSARTAN 1 TABLET: 24; 26 TABLET, FILM COATED ORAL at 08:17

## 2023-07-27 RX ADMIN — HYDROCODONE BITARTRATE AND ACETAMINOPHEN 1 TABLET: 5; 325 TABLET ORAL at 06:25

## 2023-07-27 RX ADMIN — SACUBITRIL AND VALSARTAN 1 TABLET: 49; 51 TABLET, FILM COATED ORAL at 20:28

## 2023-07-27 RX ADMIN — APIXABAN 5 MG: 5 TABLET, FILM COATED ORAL at 08:17

## 2023-07-27 RX ADMIN — BUMETANIDE 1 MG: 0.25 INJECTION INTRAMUSCULAR; INTRAVENOUS at 08:17

## 2023-07-27 RX ADMIN — CETIRIZINE HYDROCHLORIDE 5 MG: 10 TABLET, FILM COATED ORAL at 08:17

## 2023-07-27 RX ADMIN — METOPROLOL SUCCINATE 100 MG: 100 TABLET, EXTENDED RELEASE ORAL at 08:17

## 2023-07-27 RX ADMIN — BUMETANIDE 1 MG: 0.25 INJECTION INTRAMUSCULAR; INTRAVENOUS at 20:28

## 2023-07-27 RX ADMIN — SODIUM CHLORIDE, PRESERVATIVE FREE 10 ML: 5 INJECTION INTRAVENOUS at 20:34

## 2023-07-27 RX ADMIN — APIXABAN 5 MG: 5 TABLET, FILM COATED ORAL at 20:28

## 2023-07-27 RX ADMIN — SODIUM CHLORIDE, PRESERVATIVE FREE 10 ML: 5 INJECTION INTRAVENOUS at 08:17

## 2023-07-27 RX ADMIN — HYDROCODONE BITARTRATE AND ACETAMINOPHEN 1 TABLET: 5; 325 TABLET ORAL at 20:33

## 2023-07-27 RX ADMIN — TRAZODONE HYDROCHLORIDE 25 MG: 50 TABLET ORAL at 20:29

## 2023-07-27 RX ADMIN — EMPAGLIFLOZIN 10 MG: 10 TABLET, FILM COATED ORAL at 09:25

## 2023-07-27 ASSESSMENT — PAIN SCALES - GENERAL
PAINLEVEL_OUTOF10: 8
PAINLEVEL_OUTOF10: 7

## 2023-07-27 ASSESSMENT — PAIN DESCRIPTION - LOCATION
LOCATION: NECK
LOCATION: NECK
LOCATION: NECK;BACK

## 2023-07-27 NOTE — CARE COORDINATION
Social Work:    Reviewed chart notes. Patient admitted due to CHF, currently on Bumex drip, continues on Entresto & Toprol XL due to hx a.fib., c-collar due to recent fall in bathroom. Social service met with Mr. Martin Perales),  advised him about social work &  roles, as well as discussed discharge planning. Debbie Perales is a patient of Dr. Thuy Singleton, as well as NP Rafael De Oliveira at the [facility]. He resides independently alone his 3- story home and his daughter Susan Snider resides nearby should he need assistance. Moe's bedroom & bathroom (tub/shower/high commode) are located on the main floor & he has 2 entry steps & handrails to get into his home. Debbie Perales has a BP & pacemaker monitor but no other equipment. He has never used HHC or snf. Debbie Perales is receptive to [facility] and had no home care preference as he is unfamiliar with any company. Social service called a referral to [facility] since they are also contracted with the [address] [place] [address] should he require help in future.      Electronically signed by JENNIFER Vazquez on 7/27/2023 at 12:50 PM

## 2023-07-27 NOTE — CONSULTS
[facility] [address] [place] [address]    Name: Edu Delgado    Age: 80 y.o. Date of Admission: 7/26/2023 12:46 PM    Date of Service: 7/27/2023    Reason for Consultation: CHF    Referring Physician: Dr Greyson Leavitt  Primary Care Physician: Ingrid Scott DO    History of Present Illness: The patient is a 80y.o. year old male states was doing well until this past week. States he has noted increase weight, edema over the past 6 days and VENEGAS over the last 4 days. Gained about 20 lbs-stats has not missed his meds, tried doubling his diuretic without help. His last weight in our office 5/1/23 was 249, now 265 on admit. No chest pain or palpitations. States has decreased ETOH from daily to Tues an Sat. Given IV diuretic twice since arrival and states going to the BR quite a bit now(no arias). History of hypertension, hyperlipidemia, permanent atrial fibrillation post AV node ablation in the past with permanent single-chamber pacemaker ( about 2.5 years to [facility]). On anticoagulation. Remote PCI of unknown vessel with no ischemia on Lexiscan June 2018. Echo done late 2019 revealed LVEF 35%, moderate mitral regurgitation and pulmonary hypertension. Echocardiogram completed September 4, 2019 revealed moderately severe left ventricular concentric hypertrophy, LVEF 35%, indeterminate diastolic function, moderate mitral regurgitation, mild tricuspid regurgitation and pulmonary hypertension.        Past Medical History:   Past Medical History:   Diagnosis Date    Alcohol abuse     Anemia     Apnea, sleep     Arthritis     Atrial fibrillation (HCC)     Atrial flutter (HCC)     CAD (coronary artery disease)     CHF (congestive heart failure) (HCC)     CKD (chronic kidney disease)     ED (erectile dysfunction)     Hearing loss     Hepatitis C     Hyperkalemia     Hypertension     Obesity     Pacemaker     Vitamin D deficiency        Review of Systems:   Constitutional: No fever, chills, sweats  Cardiac: bleeding requiring transfusions       Home Medications:  Prior to Admission medications    Medication Sig Start Date End Date Taking?  Authorizing Provider   sacubitril-valsartan (ENTRESTO) 24-26 MG per tablet Take 1 tablet by mouth 2 times daily 11/8/22   Jordon Perdomo MD   bumetanide (BUMEX) 1 MG tablet Take 1 tablet by mouth daily 11/8/22   Jordon Perdomo MD   metoprolol succinate (TOPROL XL) 100 MG extended release tablet Take 1 tablet by mouth daily 10/15/22   Bob Amawalk, APRN - CNP   guaiFENesin 400 MG tablet Take 1 tablet by mouth 2 times daily as needed for Cough  Patient not taking: Reported on 7/26/2023 8/15/21   Vickie Hensley DO   vitamin C (ASCORBIC ACID) 500 MG tablet Take 2 tablets by mouth daily for 10 days 11/22/20 10/11/22  MANUEL Helms - CNS   apixaban (ELIQUIS) 5 MG TABS tablet Take by mouth 2 times daily    Historical Provider, MD   cetirizine (ZYRTEC) 10 MG tablet Take 1 tablet by mouth daily    Historical Provider, MD       Current Medications:  Current Facility-Administered Medications   Medication Dose Route Frequency Provider Last Rate Last Admin    apixaban (ELIQUIS) tablet 5 mg  5 mg Oral BID Clement Nobles MD   5 mg at 07/26/23 2118    cetirizine (ZYRTEC) tablet 5 mg  5 mg Oral Daily Clement Nobles MD   5 mg at 07/26/23 2118    metoprolol succinate (TOPROL XL) extended release tablet 100 mg  100 mg Oral Daily Clement Nobles MD   100 mg at 07/26/23 2118    sacubitril-valsartan (ENTRESTO) 24-26 MG per tablet 1 tablet  1 tablet Oral BID Clement Nobles MD   1 tablet at 07/26/23 2118    sodium chloride flush 0.9 % injection 5-40 mL  5-40 mL IntraVENous 2 times per day Clement Nobles MD   10 mL at 07/26/23 2119    sodium chloride flush 0.9 % injection 5-40 mL  5-40 mL IntraVENous PRN Clement Nobles MD        0.9 % sodium chloride infusion   IntraVENous PRN Clement Nobles MD        polyethylene glycol (GLYCOLAX) packet 17 g  17 g Oral Daily PRN Clement Nobles MD

## 2023-07-27 NOTE — ACP (ADVANCE CARE PLANNING)
Advance Care Planning   Healthcare Decision Maker:    Primary Decision Maker: Del Matias - [phone]    Click here to complete Healthcare Decision Makers including selection of the Healthcare Decision Maker Relationship (ie \"Primary\").

## 2023-07-28 LAB
ANION GAP SERPL CALCULATED.3IONS-SCNC: 10 MMOL/L (ref 7–16)
BUN SERPL-MCNC: 27 MG/DL (ref 6–23)
CALCIUM SERPL-MCNC: 9.2 MG/DL (ref 8.6–10.2)
CHLORIDE SERPL-SCNC: 103 MMOL/L (ref 98–107)
CO2 SERPL-SCNC: 31 MMOL/L (ref 22–29)
CREAT SERPL-MCNC: 1.5 MG/DL (ref 0.7–1.2)
GFR SERPL CREATININE-BSD FRML MDRD: 46 ML/MIN/1.73M2
GLUCOSE SERPL-MCNC: 116 MG/DL (ref 74–99)
POTASSIUM SERPL-SCNC: 3.7 MMOL/L (ref 3.5–5)
SODIUM SERPL-SCNC: 144 MMOL/L (ref 132–146)

## 2023-07-28 PROCEDURE — 80048 BASIC METABOLIC PNL TOTAL CA: CPT

## 2023-07-28 PROCEDURE — 2060000000 HC ICU INTERMEDIATE R&B

## 2023-07-28 PROCEDURE — 6370000000 HC RX 637 (ALT 250 FOR IP): Performed by: INTERNAL MEDICINE

## 2023-07-28 PROCEDURE — 2580000003 HC RX 258: Performed by: INTERNAL MEDICINE

## 2023-07-28 PROCEDURE — 2500000003 HC RX 250 WO HCPCS: Performed by: INTERNAL MEDICINE

## 2023-07-28 PROCEDURE — 99232 SBSQ HOSP IP/OBS MODERATE 35: CPT | Performed by: INTERNAL MEDICINE

## 2023-07-28 PROCEDURE — 6370000000 HC RX 637 (ALT 250 FOR IP)

## 2023-07-28 RX ORDER — NAPROXEN 250 MG/1
250 TABLET ORAL ONCE
Status: COMPLETED | OUTPATIENT
Start: 2023-07-28 | End: 2023-07-28

## 2023-07-28 RX ORDER — TRAZODONE HYDROCHLORIDE 50 MG/1
50 TABLET ORAL NIGHTLY
Status: DISCONTINUED | OUTPATIENT
Start: 2023-07-28 | End: 2023-07-30 | Stop reason: HOSPADM

## 2023-07-28 RX ADMIN — SODIUM CHLORIDE, PRESERVATIVE FREE 10 ML: 5 INJECTION INTRAVENOUS at 09:17

## 2023-07-28 RX ADMIN — CETIRIZINE HYDROCHLORIDE 5 MG: 10 TABLET, FILM COATED ORAL at 09:17

## 2023-07-28 RX ADMIN — BUMETANIDE 1 MG: 0.25 INJECTION INTRAMUSCULAR; INTRAVENOUS at 09:18

## 2023-07-28 RX ADMIN — SACUBITRIL AND VALSARTAN 1 TABLET: 49; 51 TABLET, FILM COATED ORAL at 20:35

## 2023-07-28 RX ADMIN — APIXABAN 5 MG: 5 TABLET, FILM COATED ORAL at 09:17

## 2023-07-28 RX ADMIN — METOPROLOL SUCCINATE 100 MG: 100 TABLET, EXTENDED RELEASE ORAL at 09:17

## 2023-07-28 RX ADMIN — APIXABAN 5 MG: 5 TABLET, FILM COATED ORAL at 20:37

## 2023-07-28 RX ADMIN — EMPAGLIFLOZIN 10 MG: 10 TABLET, FILM COATED ORAL at 09:17

## 2023-07-28 RX ADMIN — SODIUM CHLORIDE, PRESERVATIVE FREE 10 ML: 5 INJECTION INTRAVENOUS at 20:37

## 2023-07-28 RX ADMIN — NAPROXEN 250 MG: 250 TABLET ORAL at 12:06

## 2023-07-28 RX ADMIN — HYDROCODONE BITARTRATE AND ACETAMINOPHEN 1 TABLET: 5; 325 TABLET ORAL at 20:40

## 2023-07-28 RX ADMIN — BUMETANIDE 1 MG: 0.25 INJECTION INTRAMUSCULAR; INTRAVENOUS at 20:36

## 2023-07-28 RX ADMIN — TRAZODONE HYDROCHLORIDE 50 MG: 50 TABLET ORAL at 20:35

## 2023-07-28 RX ADMIN — SACUBITRIL AND VALSARTAN 1 TABLET: 49; 51 TABLET, FILM COATED ORAL at 09:17

## 2023-07-28 RX ADMIN — TRAZODONE HYDROCHLORIDE 25 MG: 50 TABLET ORAL at 00:51

## 2023-07-28 ASSESSMENT — PAIN SCALES - GENERAL
PAINLEVEL_OUTOF10: 6
PAINLEVEL_OUTOF10: 6

## 2023-07-28 ASSESSMENT — PAIN DESCRIPTION - LOCATION
LOCATION: NECK
LOCATION: FOOT;NECK

## 2023-07-28 ASSESSMENT — PAIN DESCRIPTION - ORIENTATION: ORIENTATION: RIGHT;LEFT

## 2023-07-28 ASSESSMENT — PAIN DESCRIPTION - DESCRIPTORS: DESCRIPTORS: THROBBING;SHARP

## 2023-07-28 NOTE — CARE COORDINATION
Social Work:    Natalya at [facility] accepted Mr. Fabi Irene referral. Social service made a follow-up visit with Mr. Anupama Miller and he did not feel HHC was needed, however, he will accept it if recommended. Natalya at [facility] is aware of possible need for home care nursing and will need orders, as well as notified of discharge if [address] is recommended.  (Natalya [phone])    Electronically signed by JENNIFER Serra on 7/28/2023 at 12:00 PM

## 2023-07-28 NOTE — PROGRESS NOTES
(2 VW)   Final Result   Suspect mild pulmonary vascular congestion. Assessment:    Principal Problem:    CHF (congestive heart failure), NYHA class I, acute on chronic, combined (HCC)  Active Problems:    Hyperglycemia    Primary hypertension    Acute on chronic combined systolic (congestive) and diastolic (congestive) heart failure (HCC)  Resolved Problems:    * No resolved hospital problems. *      Plan:  Acute on chronic systolic/diastolic chf cardio on board. Continue iv bumex. Check weights. Atrial fib continue meds  Htn continue med  Hyperglycemia likely due to stress reaction montior  CAD continue to monitor cardio following    Pt uses alleve at home for his pain and works better. Did inform him of the concern for nsaids and being on blood thin  Will give a dose of naproxen(in light of being on eliquis) and monitor. Encourage activity.     Electronically signed by Char Augustin DO on 7/28/2023 at 9:58 AM

## 2023-07-29 LAB
ANION GAP SERPL CALCULATED.3IONS-SCNC: 7 MMOL/L (ref 7–16)
BUN SERPL-MCNC: 31 MG/DL (ref 6–23)
CALCIUM SERPL-MCNC: 9 MG/DL (ref 8.6–10.2)
CHLORIDE SERPL-SCNC: 103 MMOL/L (ref 98–107)
CO2 SERPL-SCNC: 30 MMOL/L (ref 22–29)
CREAT SERPL-MCNC: 1.5 MG/DL (ref 0.7–1.2)
GFR SERPL CREATININE-BSD FRML MDRD: 46 ML/MIN/1.73M2
GLUCOSE SERPL-MCNC: 102 MG/DL (ref 74–99)
POTASSIUM SERPL-SCNC: 3.7 MMOL/L (ref 3.5–5)
REASON FOR REJECTION: NORMAL
SODIUM SERPL-SCNC: 140 MMOL/L (ref 132–146)
SPECIMEN SOURCE: NORMAL
ZZ NTE CLEAN UP: ORDERED TEST: NORMAL

## 2023-07-29 PROCEDURE — 6370000000 HC RX 637 (ALT 250 FOR IP): Performed by: INTERNAL MEDICINE

## 2023-07-29 PROCEDURE — 99232 SBSQ HOSP IP/OBS MODERATE 35: CPT | Performed by: INTERNAL MEDICINE

## 2023-07-29 PROCEDURE — 2580000003 HC RX 258: Performed by: INTERNAL MEDICINE

## 2023-07-29 PROCEDURE — 2060000000 HC ICU INTERMEDIATE R&B

## 2023-07-29 PROCEDURE — 2500000003 HC RX 250 WO HCPCS: Performed by: INTERNAL MEDICINE

## 2023-07-29 PROCEDURE — 80048 BASIC METABOLIC PNL TOTAL CA: CPT

## 2023-07-29 RX ORDER — NAPROXEN 250 MG/1
250 TABLET ORAL ONCE
Status: COMPLETED | OUTPATIENT
Start: 2023-07-29 | End: 2023-07-29

## 2023-07-29 RX ADMIN — SODIUM CHLORIDE, PRESERVATIVE FREE 10 ML: 5 INJECTION INTRAVENOUS at 09:50

## 2023-07-29 RX ADMIN — SACUBITRIL AND VALSARTAN 1 TABLET: 49; 51 TABLET, FILM COATED ORAL at 20:19

## 2023-07-29 RX ADMIN — BUMETANIDE 1 MG: 0.25 INJECTION INTRAMUSCULAR; INTRAVENOUS at 20:19

## 2023-07-29 RX ADMIN — BUMETANIDE 1 MG: 0.25 INJECTION INTRAMUSCULAR; INTRAVENOUS at 09:49

## 2023-07-29 RX ADMIN — SACUBITRIL AND VALSARTAN 1 TABLET: 49; 51 TABLET, FILM COATED ORAL at 09:48

## 2023-07-29 RX ADMIN — NAPROXEN 250 MG: 250 TABLET ORAL at 20:18

## 2023-07-29 RX ADMIN — TRAZODONE HYDROCHLORIDE 50 MG: 50 TABLET ORAL at 20:19

## 2023-07-29 RX ADMIN — APIXABAN 5 MG: 5 TABLET, FILM COATED ORAL at 20:19

## 2023-07-29 RX ADMIN — EMPAGLIFLOZIN 10 MG: 10 TABLET, FILM COATED ORAL at 09:48

## 2023-07-29 RX ADMIN — METOPROLOL SUCCINATE 100 MG: 100 TABLET, EXTENDED RELEASE ORAL at 09:49

## 2023-07-29 RX ADMIN — SODIUM CHLORIDE, PRESERVATIVE FREE 10 ML: 5 INJECTION INTRAVENOUS at 20:19

## 2023-07-29 RX ADMIN — CETIRIZINE HYDROCHLORIDE 5 MG: 10 TABLET, FILM COATED ORAL at 09:48

## 2023-07-29 RX ADMIN — APIXABAN 5 MG: 5 TABLET, FILM COATED ORAL at 09:48

## 2023-07-29 ASSESSMENT — PAIN - FUNCTIONAL ASSESSMENT: PAIN_FUNCTIONAL_ASSESSMENT: ACTIVITIES ARE NOT PREVENTED

## 2023-07-29 ASSESSMENT — PAIN SCALES - GENERAL: PAINLEVEL_OUTOF10: 6

## 2023-07-29 ASSESSMENT — PAIN DESCRIPTION - DESCRIPTORS: DESCRIPTORS: SORE

## 2023-07-29 ASSESSMENT — PAIN DESCRIPTION - LOCATION: LOCATION: NECK

## 2023-07-29 ASSESSMENT — PAIN DESCRIPTION - ORIENTATION: ORIENTATION: POSTERIOR

## 2023-07-30 VITALS
SYSTOLIC BLOOD PRESSURE: 101 MMHG | RESPIRATION RATE: 18 BRPM | WEIGHT: 266.5 LBS | BODY MASS INDEX: 37.31 KG/M2 | HEART RATE: 70 BPM | HEIGHT: 71 IN | OXYGEN SATURATION: 96 % | DIASTOLIC BLOOD PRESSURE: 67 MMHG | TEMPERATURE: 97.8 F

## 2023-07-30 LAB
ANION GAP SERPL CALCULATED.3IONS-SCNC: 9 MMOL/L (ref 7–16)
BUN SERPL-MCNC: 30 MG/DL (ref 6–23)
CALCIUM SERPL-MCNC: 8.8 MG/DL (ref 8.6–10.2)
CHLORIDE SERPL-SCNC: 103 MMOL/L (ref 98–107)
CO2 SERPL-SCNC: 30 MMOL/L (ref 22–29)
CREAT SERPL-MCNC: 1.5 MG/DL (ref 0.7–1.2)
GFR SERPL CREATININE-BSD FRML MDRD: 46 ML/MIN/1.73M2
GLUCOSE SERPL-MCNC: 96 MG/DL (ref 74–99)
POTASSIUM SERPL-SCNC: 3.8 MMOL/L (ref 3.5–5)
SODIUM SERPL-SCNC: 142 MMOL/L (ref 132–146)

## 2023-07-30 PROCEDURE — 6370000000 HC RX 637 (ALT 250 FOR IP): Performed by: INTERNAL MEDICINE

## 2023-07-30 PROCEDURE — 80048 BASIC METABOLIC PNL TOTAL CA: CPT

## 2023-07-30 PROCEDURE — 99239 HOSP IP/OBS DSCHRG MGMT >30: CPT | Performed by: INTERNAL MEDICINE

## 2023-07-30 RX ORDER — BUMETANIDE 2 MG/1
2 TABLET ORAL DAILY
Qty: 30 TABLET | Refills: 3 | Status: SHIPPED | OUTPATIENT
Start: 2023-07-31

## 2023-07-30 RX ORDER — BUMETANIDE 1 MG/1
2 TABLET ORAL DAILY
Status: DISCONTINUED | OUTPATIENT
Start: 2023-07-30 | End: 2023-07-30 | Stop reason: HOSPADM

## 2023-07-30 RX ADMIN — METOPROLOL SUCCINATE 100 MG: 100 TABLET, EXTENDED RELEASE ORAL at 10:14

## 2023-07-30 RX ADMIN — BUMETANIDE 2 MG: 1 TABLET ORAL at 10:14

## 2023-07-30 RX ADMIN — APIXABAN 5 MG: 5 TABLET, FILM COATED ORAL at 10:14

## 2023-07-30 RX ADMIN — SACUBITRIL AND VALSARTAN 1 TABLET: 49; 51 TABLET, FILM COATED ORAL at 10:14

## 2023-07-30 RX ADMIN — CETIRIZINE HYDROCHLORIDE 5 MG: 10 TABLET, FILM COATED ORAL at 10:14

## 2023-07-30 RX ADMIN — EMPAGLIFLOZIN 10 MG: 10 TABLET, FILM COATED ORAL at 10:14

## 2023-07-30 NOTE — PROGRESS NOTES
Nutrition Note    Type and Reason for Visit: Patient Education    Nutrition Assessment:  Pt to be educated per CHF LOS protocol. Chart reviewed. Pt provided w/ written edu on the Cardiac/CHF Diet which includes main diet guidelines, ways to reduce sodium intake and sample meal plans. Handout and contact info placed in d/c instructions. Will follow-up per policy.     Breck Sandhoff, RD, LD  Contact: ext [phone]

## 2023-07-31 ENCOUNTER — HOSPITAL ENCOUNTER (OUTPATIENT)
Dept: CT IMAGING | Age: 83
Discharge: HOME | End: 2023-08-02
Attending: NEUROLOGICAL SURGERY
Payer: MEDICARE

## 2023-07-31 ENCOUNTER — TELEPHONE (OUTPATIENT)
Dept: NEUROSURGERY | Age: 83
End: 2023-07-31

## 2023-07-31 DIAGNOSIS — S12.100A CLOSED ODONTOID FRACTURE, INITIAL ENCOUNTER (HCC): ICD-10-CM

## 2023-07-31 PROCEDURE — 72125 CT NECK SPINE W/O DYE: CPT

## 2023-07-31 NOTE — TELEPHONE ENCOUNTER
Pt saw Dr Orlin Bond on 7/21 for a 1 month ED follow up C1-C2 fracture. The note is signed but not completed. He completed a cervical CT today and would like to know the results and the follow up plan. Pt can be reached at [phone].

## 2023-08-04 ENCOUNTER — OFFICE VISIT (OUTPATIENT)
Dept: NEUROSURGERY | Age: 83
End: 2023-08-04
Payer: MEDICARE

## 2023-08-04 VITALS
BODY MASS INDEX: 37.24 KG/M2 | TEMPERATURE: 98 F | HEART RATE: 86 BPM | OXYGEN SATURATION: 95 % | WEIGHT: 266 LBS | HEIGHT: 71 IN | SYSTOLIC BLOOD PRESSURE: 122 MMHG | DIASTOLIC BLOOD PRESSURE: 73 MMHG

## 2023-08-04 DIAGNOSIS — S12.090D OTHER CLOSED DISPLACED FRACTURE OF FIRST CERVICAL VERTEBRA WITH ROUTINE HEALING, SUBSEQUENT ENCOUNTER: Primary | ICD-10-CM

## 2023-08-04 PROCEDURE — 3074F SYST BP LT 130 MM HG: CPT | Performed by: STUDENT IN AN ORGANIZED HEALTH CARE EDUCATION/TRAINING PROGRAM

## 2023-08-04 PROCEDURE — 99213 OFFICE O/P EST LOW 20 MIN: CPT | Performed by: STUDENT IN AN ORGANIZED HEALTH CARE EDUCATION/TRAINING PROGRAM

## 2023-08-04 PROCEDURE — G8427 DOCREV CUR MEDS BY ELIG CLIN: HCPCS | Performed by: STUDENT IN AN ORGANIZED HEALTH CARE EDUCATION/TRAINING PROGRAM

## 2023-08-04 PROCEDURE — 1123F ACP DISCUSS/DSCN MKR DOCD: CPT | Performed by: STUDENT IN AN ORGANIZED HEALTH CARE EDUCATION/TRAINING PROGRAM

## 2023-08-04 PROCEDURE — 1111F DSCHRG MED/CURRENT MED MERGE: CPT | Performed by: STUDENT IN AN ORGANIZED HEALTH CARE EDUCATION/TRAINING PROGRAM

## 2023-08-04 PROCEDURE — 3078F DIAST BP <80 MM HG: CPT | Performed by: STUDENT IN AN ORGANIZED HEALTH CARE EDUCATION/TRAINING PROGRAM

## 2023-08-04 PROCEDURE — G8417 CALC BMI ABV UP PARAM F/U: HCPCS | Performed by: STUDENT IN AN ORGANIZED HEALTH CARE EDUCATION/TRAINING PROGRAM

## 2023-08-04 PROCEDURE — 1036F TOBACCO NON-USER: CPT | Performed by: STUDENT IN AN ORGANIZED HEALTH CARE EDUCATION/TRAINING PROGRAM

## 2023-08-04 NOTE — PROGRESS NOTES
Hospital Follow-up     This is a 80year old male who presents to the office for a 2 month follow-up s/p C1 arch and dens fracture     Subjective: Omkar Banegas is a 80 y.o.  male who presents for a follow up. Patient states back in May he fell and landed on his face. He went to [facility] and got a CT Cervical spine which showed a C1 posterior arch fracture and dens fracture. Patient was sent to [facility] ER and was given a custom cervical collar. On exam today, patient denies any neck pain. He denies any pain down the arms. No numbness or weakness. No other complaints. C-Collar complaint. CT cervical spine reviewed with patient. Physical Exam:              WDWN, no apparent distress              Non-labored breathing               Vitals Stable              Alert and oriented x3              CN 3-12 intact              PERRL              EOMI              ESCAMILLA well              Motor strength symmetric              Sensation to LT intact bilaterally   In C-Collar                 Imagin2023 CT Cervical Spine   1. Subacute to chronic, ununited fracture of the odontoid process with minimal 1 mm anterolisthesis of the odontoid fragment relative to the C2 base which is an interim development from the prior examination of 2023.  2. 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  male presenting for a 2 month follow-up s/p C1 arch and dens fracture. Plan:  -Pain control and expectations discussed  -Continue brace and restrictions x 1 more month  -OARRS report reviewed   -Follow-up in neurosurgery clinic in 1 month with repeat XR  -Call or return to neurosurgery office sooner if symptoms worsen or if new issues arise in the interim.     Electronically signed by Fredis Menezes PA-C on 2023 at 3:58 PM

## 2023-09-05 ENCOUNTER — HOSPITAL ENCOUNTER (OUTPATIENT)
Age: 83
Discharge: HOME | End: 2023-09-07
Payer: MEDICAID

## 2023-09-05 ENCOUNTER — HOSPITAL ENCOUNTER (OUTPATIENT)
Dept: GENERAL RADIOLOGY | Age: 83
Discharge: HOME | End: 2023-09-07
Payer: MEDICAID

## 2023-09-05 ENCOUNTER — OFFICE VISIT (OUTPATIENT)
Dept: NEUROSURGERY | Age: 83
End: 2023-09-05
Payer: MEDICARE

## 2023-09-05 DIAGNOSIS — S12.120K OTHER CLOSED DISPLACED ODONTOID FRACTURE WITH NONUNION, SUBSEQUENT ENCOUNTER: ICD-10-CM

## 2023-09-05 DIAGNOSIS — S12.090D OTHER CLOSED DISPLACED FRACTURE OF FIRST CERVICAL VERTEBRA WITH ROUTINE HEALING, SUBSEQUENT ENCOUNTER: ICD-10-CM

## 2023-09-05 DIAGNOSIS — S12.030K CLOSED DISPLACED FRACTURE OF POSTERIOR ARCH OF FIRST CERVICAL VERTEBRA WITH NONUNION, SUBSEQUENT ENCOUNTER: Primary | ICD-10-CM

## 2023-09-05 PROCEDURE — G8427 DOCREV CUR MEDS BY ELIG CLIN: HCPCS | Performed by: STUDENT IN AN ORGANIZED HEALTH CARE EDUCATION/TRAINING PROGRAM

## 2023-09-05 PROCEDURE — 99213 OFFICE O/P EST LOW 20 MIN: CPT | Performed by: STUDENT IN AN ORGANIZED HEALTH CARE EDUCATION/TRAINING PROGRAM

## 2023-09-05 PROCEDURE — G8417 CALC BMI ABV UP PARAM F/U: HCPCS | Performed by: STUDENT IN AN ORGANIZED HEALTH CARE EDUCATION/TRAINING PROGRAM

## 2023-09-05 PROCEDURE — 72040 X-RAY EXAM NECK SPINE 2-3 VW: CPT

## 2023-09-05 PROCEDURE — 99212 OFFICE O/P EST SF 10 MIN: CPT

## 2023-09-05 PROCEDURE — 1036F TOBACCO NON-USER: CPT | Performed by: STUDENT IN AN ORGANIZED HEALTH CARE EDUCATION/TRAINING PROGRAM

## 2023-09-05 PROCEDURE — 1123F ACP DISCUSS/DSCN MKR DOCD: CPT | Performed by: STUDENT IN AN ORGANIZED HEALTH CARE EDUCATION/TRAINING PROGRAM

## 2023-09-05 NOTE — PROGRESS NOTES
Hospital Follow-up     This is a 80year old male who presents to the office for a 3 month follow-up s/p C1 arch and dens fracture     Subjective: Lisseth Lopez is a 80 y.o.  male who presents for a follow up. Patient states back in May he fell and landed on his face. He went to [facility] and got a CT Cervical spine which showed a C1 posterior arch fracture and dens fracture. Patient was sent to [facility] ER and was given a custom cervical collar. Patient presents for 3 month follow up. On exam today, patient denies any neck pain. He denies any pain down the arms. No numbness or weakness. No other complaints. Patient states on Friday his collar broke, but he has been C-Collar complaint otherwise. XR reviewed with patient. Physical Exam:              WDWN, no apparent distress              Non-labored breathing               Vitals Stable              Alert and oriented x3              CN 3-12 intact              PERRL              EOMI              ESCAMILLA well              Motor strength symmetric              Sensation to LT intact bilaterally   In C-Collar                 Imagin2023 XR Cervical Spine   cununited fracture of the odontoid process with anterolisthesis of the odontoid fragment relative to the C2 base -Final read pending     Assessment: This is a 80 y.o.  male presenting for a 3 month follow-up s/p C1 arch and dens fracture. Plan:  -Pain control and expectations discussed  -XR reviewed and discussed with patient, no pain, numbness or weakness.   -Can discontinue brace and restrictions   -OARRS report reviewed   -Follow-up in neurosurgery clinic prn  -Call or return to neurosurgery office sooner if symptoms worsen or if new issues arise in the interim.     Electronically signed by Didi Prince PA-C on 2023 at 10:16 AM

## 2023-09-07 ENCOUNTER — APPOINTMENT (OUTPATIENT)
Dept: GENERAL RADIOLOGY | Age: 83
DRG: 603 | End: 2023-09-07
Payer: MEDICARE

## 2023-09-07 ENCOUNTER — HOSPITAL ENCOUNTER (EMERGENCY)
Age: 83
Discharge: LEFT BEFORE DISCHARGE | DRG: 603 | End: 2023-09-07
Attending: EMERGENCY MEDICINE
Payer: MEDICARE

## 2023-09-07 ENCOUNTER — APPOINTMENT (OUTPATIENT)
Dept: ULTRASOUND IMAGING | Age: 83
DRG: 603 | End: 2023-09-07
Payer: MEDICARE

## 2023-09-07 VITALS
HEART RATE: 78 BPM | RESPIRATION RATE: 17 BRPM | DIASTOLIC BLOOD PRESSURE: 73 MMHG | SYSTOLIC BLOOD PRESSURE: 121 MMHG | OXYGEN SATURATION: 97 % | BODY MASS INDEX: 35.7 KG/M2 | HEIGHT: 71 IN | WEIGHT: 255 LBS | TEMPERATURE: 97.7 F

## 2023-09-07 DIAGNOSIS — R06.02 SHORTNESS OF BREATH: ICD-10-CM

## 2023-09-07 DIAGNOSIS — M79.89 FOOT SWELLING: Primary | ICD-10-CM

## 2023-09-07 DIAGNOSIS — R79.89 ELEVATED SERUM CREATININE: ICD-10-CM

## 2023-09-07 LAB
ALBUMIN SERPL-MCNC: 4 G/DL (ref 3.5–5.2)
ALP SERPL-CCNC: 79 U/L (ref 40–129)
ALT SERPL-CCNC: 9 U/L (ref 0–40)
ANION GAP SERPL CALCULATED.3IONS-SCNC: 11 MMOL/L (ref 7–16)
AST SERPL-CCNC: 16 U/L (ref 0–39)
BASOPHILS # BLD: 0.02 K/UL (ref 0–0.2)
BASOPHILS NFR BLD: 0 % (ref 0–2)
BILIRUB SERPL-MCNC: 0.6 MG/DL (ref 0–1.2)
BNP SERPL-MCNC: 963 PG/ML (ref 0–450)
BUN SERPL-MCNC: 28 MG/DL (ref 6–23)
CALCIUM SERPL-MCNC: 9.6 MG/DL (ref 8.6–10.2)
CHLORIDE SERPL-SCNC: 101 MMOL/L (ref 98–107)
CO2 SERPL-SCNC: 27 MMOL/L (ref 22–29)
CREAT SERPL-MCNC: 1.5 MG/DL (ref 0.7–1.2)
EOSINOPHIL # BLD: 0.15 K/UL (ref 0.05–0.5)
EOSINOPHILS RELATIVE PERCENT: 2 % (ref 0–6)
ERYTHROCYTE [DISTWIDTH] IN BLOOD BY AUTOMATED COUNT: 13.1 % (ref 11.5–15)
GFR SERPL CREATININE-BSD FRML MDRD: 46 ML/MIN/1.73M2
GLUCOSE SERPL-MCNC: 97 MG/DL (ref 74–99)
HCT VFR BLD AUTO: 41.9 % (ref 37–54)
HGB BLD-MCNC: 13.8 G/DL (ref 12.5–16.5)
IMM GRANULOCYTES # BLD AUTO: <0.03 K/UL (ref 0–0.58)
IMM GRANULOCYTES NFR BLD: 0 % (ref 0–5)
LACTATE BLDV-SCNC: 1.1 MMOL/L (ref 0.5–2.2)
LYMPHOCYTES NFR BLD: 0.92 K/UL (ref 1.5–4)
LYMPHOCYTES RELATIVE PERCENT: 11 % (ref 20–42)
MCH RBC QN AUTO: 33.1 PG (ref 26–35)
MCHC RBC AUTO-ENTMCNC: 32.9 G/DL (ref 32–34.5)
MCV RBC AUTO: 100.5 FL (ref 80–99.9)
MONOCYTES NFR BLD: 0.77 K/UL (ref 0.1–0.95)
MONOCYTES NFR BLD: 9 % (ref 2–12)
NEUTROPHILS NFR BLD: 77 % (ref 43–80)
NEUTS SEG NFR BLD: 6.34 K/UL (ref 1.8–7.3)
PLATELET # BLD AUTO: 202 K/UL (ref 130–450)
PMV BLD AUTO: 11.3 FL (ref 7–12)
POTASSIUM SERPL-SCNC: 4.6 MMOL/L (ref 3.5–5)
PROT SERPL-MCNC: 7.3 G/DL (ref 6.4–8.3)
RBC # BLD AUTO: 4.17 M/UL (ref 3.8–5.8)
SODIUM SERPL-SCNC: 139 MMOL/L (ref 132–146)
TROPONIN I SERPL HS-MCNC: 29 NG/L (ref 0–11)
WBC OTHER # BLD: 8.2 K/UL (ref 4.5–11.5)

## 2023-09-07 PROCEDURE — 99285 EMERGENCY DEPT VISIT HI MDM: CPT

## 2023-09-07 PROCEDURE — 84484 ASSAY OF TROPONIN QUANT: CPT

## 2023-09-07 PROCEDURE — 93005 ELECTROCARDIOGRAM TRACING: CPT | Performed by: PHYSICIAN ASSISTANT

## 2023-09-07 PROCEDURE — 71046 X-RAY EXAM CHEST 2 VIEWS: CPT

## 2023-09-07 PROCEDURE — 85025 COMPLETE CBC W/AUTO DIFF WBC: CPT

## 2023-09-07 PROCEDURE — 83880 ASSAY OF NATRIURETIC PEPTIDE: CPT

## 2023-09-07 PROCEDURE — 93971 EXTREMITY STUDY: CPT

## 2023-09-07 PROCEDURE — 83605 ASSAY OF LACTIC ACID: CPT

## 2023-09-07 PROCEDURE — 80053 COMPREHEN METABOLIC PANEL: CPT

## 2023-09-07 RX ORDER — RIVAROXABAN 10 MG/1
10 TABLET, FILM COATED ORAL DAILY
Status: ON HOLD | COMMUNITY
End: 2023-09-13 | Stop reason: HOSPADM

## 2023-09-07 RX ORDER — CEPHALEXIN 500 MG/1
500 CAPSULE ORAL 2 TIMES DAILY
Qty: 14 CAPSULE | Refills: 0 | Status: ON HOLD | OUTPATIENT
Start: 2023-09-07 | End: 2023-09-13 | Stop reason: HOSPADM

## 2023-09-07 RX ORDER — DOXYCYCLINE HYCLATE 100 MG
100 TABLET ORAL 2 TIMES DAILY
Qty: 20 TABLET | Refills: 0 | Status: ON HOLD | OUTPATIENT
Start: 2023-09-07 | End: 2023-09-13 | Stop reason: HOSPADM

## 2023-09-07 ASSESSMENT — LIFESTYLE VARIABLES
HOW OFTEN DO YOU HAVE A DRINK CONTAINING ALCOHOL: NEVER
HOW MANY STANDARD DRINKS CONTAINING ALCOHOL DO YOU HAVE ON A TYPICAL DAY: PATIENT DOES NOT DRINK

## 2023-09-07 ASSESSMENT — PAIN DESCRIPTION - DESCRIPTORS: DESCRIPTORS: THROBBING

## 2023-09-07 ASSESSMENT — PAIN DESCRIPTION - ORIENTATION: ORIENTATION: RIGHT

## 2023-09-07 ASSESSMENT — PAIN DESCRIPTION - LOCATION: LOCATION: FOOT

## 2023-09-07 ASSESSMENT — PAIN - FUNCTIONAL ASSESSMENT: PAIN_FUNCTIONAL_ASSESSMENT: 0-10

## 2023-09-07 ASSESSMENT — PAIN SCALES - GENERAL: PAINLEVEL_OUTOF10: 10

## 2023-09-08 LAB
EKG ATRIAL RATE: 66 BPM
EKG Q-T INTERVAL: 476 MS
EKG QRS DURATION: 198 MS
EKG QTC CALCULATION (BAZETT): 545 MS
EKG R AXIS: -80 DEGREES
EKG T AXIS: 78 DEGREES
EKG VENTRICULAR RATE: 79 BPM

## 2023-09-08 PROCEDURE — 93010 ELECTROCARDIOGRAM REPORT: CPT | Performed by: INTERNAL MEDICINE

## 2023-09-08 ASSESSMENT — ENCOUNTER SYMPTOMS
ABDOMINAL PAIN: 0
WHEEZING: 0
TROUBLE SWALLOWING: 0
NAUSEA: 0
DIARRHEA: 0
SHORTNESS OF BREATH: 1
VOMITING: 0
PHOTOPHOBIA: 0
VOICE CHANGE: 0

## 2023-09-08 NOTE — ED NOTES
Pt stated \"I know you're all pissed at me, but my oxygen was NOT 78% when I got here, it was 85%. (On room air). I want to leave, get this out of my arm. \"    IV removed, AMA form signed. Discharge paperwork provided with antibiotics sent to pharmacy. When asked if pt wanted wheelchair, pt stated \"you guys won't even help me out to my car in the parking lot so what's the point. \" I reasked pt if he wanted wheelchair, pt stated \"sure\". This nurse got pt a wheelchair, and transport help assist him to the waiting room.      Bridger Mcarthur RN  09/07/23 0538

## 2023-09-08 NOTE — ED NOTES
Updated pt daughter on status. Daughter to call and talk pt into staying for possible admission.      Lupe Leonardo RN  09/07/23 3879

## 2023-09-10 ENCOUNTER — HOSPITAL ENCOUNTER (INPATIENT)
Age: 83
LOS: 3 days | Discharge: HOME | DRG: 603 | End: 2023-09-13
Attending: INTERNAL MEDICINE | Admitting: INTERNAL MEDICINE
Payer: MEDICARE

## 2023-09-10 ENCOUNTER — HOSPITAL ENCOUNTER (EMERGENCY)
Age: 83
Discharge: ANOTHER HOSPITAL | End: 2023-09-10
Attending: STUDENT IN AN ORGANIZED HEALTH CARE EDUCATION/TRAINING PROGRAM
Payer: MEDICARE

## 2023-09-10 ENCOUNTER — APPOINTMENT (OUTPATIENT)
Dept: GENERAL RADIOLOGY | Age: 83
End: 2023-09-10
Payer: MEDICARE

## 2023-09-10 VITALS
RESPIRATION RATE: 18 BRPM | HEART RATE: 86 BPM | OXYGEN SATURATION: 94 % | WEIGHT: 252 LBS | BODY MASS INDEX: 35.28 KG/M2 | HEIGHT: 71 IN | SYSTOLIC BLOOD PRESSURE: 131 MMHG | TEMPERATURE: 97.8 F | DIASTOLIC BLOOD PRESSURE: 65 MMHG

## 2023-09-10 DIAGNOSIS — L03.115 CELLULITIS OF RIGHT LEG: Primary | ICD-10-CM

## 2023-09-10 DIAGNOSIS — L03.115 CELLULITIS OF RIGHT LOWER EXTREMITY: Primary | ICD-10-CM

## 2023-09-10 PROBLEM — Z86.79 HISTORY OF CHF (CONGESTIVE HEART FAILURE): Status: ACTIVE | Noted: 2023-09-10

## 2023-09-10 PROBLEM — Z86.79 HISTORY OF ATRIAL FIBRILLATION: Status: ACTIVE | Noted: 2023-09-10

## 2023-09-10 LAB
ALBUMIN SERPL-MCNC: 3.9 G/DL (ref 3.5–5.2)
ALP SERPL-CCNC: 81 U/L (ref 40–129)
ALT SERPL-CCNC: 12 U/L (ref 0–40)
ANION GAP SERPL CALCULATED.3IONS-SCNC: 9 MMOL/L (ref 7–16)
AST SERPL-CCNC: 17 U/L (ref 0–39)
BASOPHILS # BLD: 0.01 K/UL (ref 0–0.2)
BASOPHILS NFR BLD: 0 % (ref 0–2)
BILIRUB SERPL-MCNC: 0.7 MG/DL (ref 0–1.2)
BUN SERPL-MCNC: 29 MG/DL (ref 6–23)
CALCIUM SERPL-MCNC: 9.8 MG/DL (ref 8.6–10.2)
CHLORIDE SERPL-SCNC: 103 MMOL/L (ref 98–107)
CO2 SERPL-SCNC: 29 MMOL/L (ref 22–29)
CREAT SERPL-MCNC: 1.5 MG/DL (ref 0.7–1.2)
CRP SERPL HS-MCNC: 74 MG/L (ref 0–5)
EOSINOPHIL # BLD: 0.18 K/UL (ref 0.05–0.5)
EOSINOPHILS RELATIVE PERCENT: 3 % (ref 0–6)
ERYTHROCYTE [DISTWIDTH] IN BLOOD BY AUTOMATED COUNT: 12.8 % (ref 11.5–15)
ERYTHROCYTE [SEDIMENTATION RATE] IN BLOOD BY WESTERGREN METHOD: 90 MM/HR (ref 0–15)
GFR SERPL CREATININE-BSD FRML MDRD: 47 ML/MIN/1.73M2
GLUCOSE SERPL-MCNC: 103 MG/DL (ref 74–99)
HCT VFR BLD AUTO: 41.6 % (ref 37–54)
HGB BLD-MCNC: 13.8 G/DL (ref 12.5–16.5)
IMM GRANULOCYTES # BLD AUTO: 0.03 K/UL (ref 0–0.58)
IMM GRANULOCYTES NFR BLD: 1 % (ref 0–5)
LACTATE BLDV-SCNC: 1.4 MMOL/L (ref 0.5–2.2)
LYMPHOCYTES NFR BLD: 0.83 K/UL (ref 1.5–4)
LYMPHOCYTES RELATIVE PERCENT: 13 % (ref 20–42)
MCH RBC QN AUTO: 32.6 PG (ref 26–35)
MCHC RBC AUTO-ENTMCNC: 33.2 G/DL (ref 32–34.5)
MCV RBC AUTO: 98.3 FL (ref 80–99.9)
MONOCYTES NFR BLD: 0.59 K/UL (ref 0.1–0.95)
MONOCYTES NFR BLD: 9 % (ref 2–12)
NEUTROPHILS NFR BLD: 75 % (ref 43–80)
NEUTS SEG NFR BLD: 4.92 K/UL (ref 1.8–7.3)
PLATELET # BLD AUTO: 249 K/UL (ref 130–450)
PMV BLD AUTO: 10.9 FL (ref 7–12)
POTASSIUM SERPL-SCNC: 5.4 MMOL/L (ref 3.5–5)
PROT SERPL-MCNC: 7.2 G/DL (ref 6.4–8.3)
RBC # BLD AUTO: 4.23 M/UL (ref 3.8–5.8)
SODIUM SERPL-SCNC: 141 MMOL/L (ref 132–146)
WBC OTHER # BLD: 6.6 K/UL (ref 4.5–11.5)

## 2023-09-10 PROCEDURE — 1200000000 HC SEMI PRIVATE

## 2023-09-10 PROCEDURE — 85652 RBC SED RATE AUTOMATED: CPT

## 2023-09-10 PROCEDURE — 87040 BLOOD CULTURE FOR BACTERIA: CPT

## 2023-09-10 PROCEDURE — 99285 EMERGENCY DEPT VISIT HI MDM: CPT

## 2023-09-10 PROCEDURE — 2580000003 HC RX 258: Performed by: EMERGENCY MEDICINE

## 2023-09-10 PROCEDURE — 96375 TX/PRO/DX INJ NEW DRUG ADDON: CPT

## 2023-09-10 PROCEDURE — 96365 THER/PROPH/DIAG IV INF INIT: CPT

## 2023-09-10 PROCEDURE — 80053 COMPREHEN METABOLIC PANEL: CPT

## 2023-09-10 PROCEDURE — 99223 1ST HOSP IP/OBS HIGH 75: CPT | Performed by: STUDENT IN AN ORGANIZED HEALTH CARE EDUCATION/TRAINING PROGRAM

## 2023-09-10 PROCEDURE — 6360000002 HC RX W HCPCS: Performed by: EMERGENCY MEDICINE

## 2023-09-10 PROCEDURE — APPSS45 APP SPLIT SHARED TIME 31-45 MINUTES

## 2023-09-10 PROCEDURE — 86140 C-REACTIVE PROTEIN: CPT

## 2023-09-10 PROCEDURE — 73630 X-RAY EXAM OF FOOT: CPT

## 2023-09-10 PROCEDURE — 96366 THER/PROPH/DIAG IV INF ADDON: CPT

## 2023-09-10 PROCEDURE — 6370000000 HC RX 637 (ALT 250 FOR IP)

## 2023-09-10 PROCEDURE — 73590 X-RAY EXAM OF LOWER LEG: CPT

## 2023-09-10 PROCEDURE — 83605 ASSAY OF LACTIC ACID: CPT

## 2023-09-10 PROCEDURE — 2580000003 HC RX 258

## 2023-09-10 PROCEDURE — 85025 COMPLETE CBC W/AUTO DIFF WBC: CPT

## 2023-09-10 RX ORDER — POLYETHYLENE GLYCOL 3350 17 G/17G
17 POWDER, FOR SOLUTION ORAL DAILY PRN
Status: DISCONTINUED | OUTPATIENT
Start: 2023-09-10 | End: 2023-09-13 | Stop reason: HOSPADM

## 2023-09-10 RX ORDER — METOPROLOL SUCCINATE 100 MG/1
100 TABLET, EXTENDED RELEASE ORAL DAILY
Status: DISCONTINUED | OUTPATIENT
Start: 2023-09-11 | End: 2023-09-13 | Stop reason: HOSPADM

## 2023-09-10 RX ORDER — SODIUM CHLORIDE 0.9 % (FLUSH) 0.9 %
5-40 SYRINGE (ML) INJECTION PRN
Status: DISCONTINUED | OUTPATIENT
Start: 2023-09-10 | End: 2023-09-13 | Stop reason: HOSPADM

## 2023-09-10 RX ORDER — ACETAMINOPHEN 650 MG/1
650 SUPPOSITORY RECTAL EVERY 6 HOURS PRN
Status: DISCONTINUED | OUTPATIENT
Start: 2023-09-10 | End: 2023-09-13 | Stop reason: HOSPADM

## 2023-09-10 RX ORDER — CETIRIZINE HYDROCHLORIDE 10 MG/1
10 TABLET ORAL DAILY
Status: DISCONTINUED | OUTPATIENT
Start: 2023-09-11 | End: 2023-09-13 | Stop reason: HOSPADM

## 2023-09-10 RX ORDER — BUMETANIDE 1 MG/1
2 TABLET ORAL DAILY
Status: DISCONTINUED | OUTPATIENT
Start: 2023-09-11 | End: 2023-09-13 | Stop reason: HOSPADM

## 2023-09-10 RX ORDER — SODIUM CHLORIDE 0.9 % (FLUSH) 0.9 %
5-40 SYRINGE (ML) INJECTION EVERY 12 HOURS SCHEDULED
Status: DISCONTINUED | OUTPATIENT
Start: 2023-09-10 | End: 2023-09-13 | Stop reason: HOSPADM

## 2023-09-10 RX ORDER — ACETAMINOPHEN 325 MG/1
650 TABLET ORAL EVERY 6 HOURS PRN
Status: DISCONTINUED | OUTPATIENT
Start: 2023-09-10 | End: 2023-09-13 | Stop reason: HOSPADM

## 2023-09-10 RX ORDER — ONDANSETRON 2 MG/ML
4 INJECTION INTRAMUSCULAR; INTRAVENOUS EVERY 6 HOURS PRN
Status: DISCONTINUED | OUTPATIENT
Start: 2023-09-10 | End: 2023-09-13 | Stop reason: HOSPADM

## 2023-09-10 RX ORDER — LANOLIN ALCOHOL/MO/W.PET/CERES
1000 CREAM (GRAM) TOPICAL DAILY
COMMUNITY

## 2023-09-10 RX ORDER — SODIUM CHLORIDE 9 MG/ML
INJECTION, SOLUTION INTRAVENOUS PRN
Status: DISCONTINUED | OUTPATIENT
Start: 2023-09-10 | End: 2023-09-13 | Stop reason: HOSPADM

## 2023-09-10 RX ORDER — ONDANSETRON 4 MG/1
4 TABLET, ORALLY DISINTEGRATING ORAL EVERY 8 HOURS PRN
Status: DISCONTINUED | OUTPATIENT
Start: 2023-09-10 | End: 2023-09-13 | Stop reason: HOSPADM

## 2023-09-10 RX ADMIN — VANCOMYCIN HYDROCHLORIDE 1750 MG: 1 INJECTION, POWDER, LYOPHILIZED, FOR SOLUTION INTRAVENOUS at 12:46

## 2023-09-10 RX ADMIN — SODIUM CHLORIDE, PRESERVATIVE FREE 10 ML: 5 INJECTION INTRAVENOUS at 23:01

## 2023-09-10 RX ADMIN — SODIUM ZIRCONIUM CYCLOSILICATE 10 G: 10 POWDER, FOR SUSPENSION ORAL at 23:01

## 2023-09-10 RX ADMIN — WATER 1000 MG: 1 INJECTION INTRAMUSCULAR; INTRAVENOUS; SUBCUTANEOUS at 12:42

## 2023-09-10 ASSESSMENT — ENCOUNTER SYMPTOMS
SINUS PRESSURE: 0
SHORTNESS OF BREATH: 0
COUGH: 0
WHEEZING: 0
BACK PAIN: 0
EYE DISCHARGE: 0
ABDOMINAL PAIN: 0
DIARRHEA: 0
VOMITING: 0
EYE PAIN: 0
NAUSEA: 0
SORE THROAT: 0
EYE REDNESS: 0

## 2023-09-10 ASSESSMENT — PAIN DESCRIPTION - DESCRIPTORS: DESCRIPTORS: SORE;DISCOMFORT;ACHING

## 2023-09-10 ASSESSMENT — PAIN SCALES - GENERAL
PAINLEVEL_OUTOF10: 10
PAINLEVEL_OUTOF10: 3

## 2023-09-10 ASSESSMENT — PAIN DESCRIPTION - ORIENTATION: ORIENTATION: RIGHT

## 2023-09-10 ASSESSMENT — PAIN - FUNCTIONAL ASSESSMENT
PAIN_FUNCTIONAL_ASSESSMENT: PREVENTS OR INTERFERES SOME ACTIVE ACTIVITIES AND ADLS
PAIN_FUNCTIONAL_ASSESSMENT: 0-10

## 2023-09-10 ASSESSMENT — LIFESTYLE VARIABLES
HOW MANY STANDARD DRINKS CONTAINING ALCOHOL DO YOU HAVE ON A TYPICAL DAY: PATIENT DOES NOT DRINK
HOW OFTEN DO YOU HAVE A DRINK CONTAINING ALCOHOL: NEVER

## 2023-09-10 ASSESSMENT — PAIN DESCRIPTION - ONSET: ONSET: ON-GOING

## 2023-09-10 ASSESSMENT — PAIN DESCRIPTION - FREQUENCY: FREQUENCY: CONTINUOUS

## 2023-09-10 ASSESSMENT — PAIN DESCRIPTION - PAIN TYPE: TYPE: ACUTE PAIN

## 2023-09-10 ASSESSMENT — PAIN DESCRIPTION - LOCATION: LOCATION: ANKLE;FOOT

## 2023-09-10 NOTE — ED PROVIDER NOTES
HPI   This is an 80-year-old male patient presents emergency department for evaluation of right lower extremity pain and swelling. Patient was evaluated at [facility] 3 days ago, he was short of breath, hypoxic and they had wanted to admit him at that time. He was having right lower leg swelling and redness at that time as well. He had decided to sign out [address] [place] [address] and he was started on Keflex and doxycycline. His right lower extremity redness and swelling have persisted. He has already taken 72 hours of Keflex and doxycycline. He denies any fevers but believes it may be worsening. Currently he has no complaints of chest pain or shortness of breath. Review of Systems   Constitutional:  Negative for chills and fever. HENT:  Negative for ear pain, sinus pressure and sore throat. Eyes:  Negative for pain, discharge and redness. Respiratory:  Negative for cough, shortness of breath and wheezing. Cardiovascular:  Negative for chest pain. Gastrointestinal:  Negative for abdominal pain, diarrhea, nausea and vomiting. Genitourinary:  Negative for dysuria and frequency. Musculoskeletal:  Negative for arthralgias and back pain. Right lower extremity redness, swelling   Skin:  Negative for rash and wound. Neurological:  Negative for weakness and headaches. Hematological:  Negative for adenopathy. All other systems reviewed and are negative. Physical Exam  Vitals and nursing note reviewed. Constitutional:       Appearance: He is well-developed. HENT:      Head: Normocephalic and atraumatic. Eyes:      Conjunctiva/sclera: Conjunctivae normal.   Cardiovascular:      Rate and Rhythm: Normal rate and regular rhythm. Heart sounds: Normal heart sounds. No murmur heard. Pulmonary:      Effort: Pulmonary effort is normal. No respiratory distress. Breath sounds: Normal breath sounds. No wheezing or rales.    Abdominal:      General: Bowel

## 2023-09-10 NOTE — ED NOTES
Called report to [facility] [address]  09/10/23 6545

## 2023-09-11 LAB
ALBUMIN SERPL-MCNC: 3.3 G/DL (ref 3.5–5.2)
ALP SERPL-CCNC: 74 U/L (ref 40–129)
ALT SERPL-CCNC: 12 U/L (ref 0–40)
ANION GAP SERPL CALCULATED.3IONS-SCNC: 11 MMOL/L (ref 7–16)
AST SERPL-CCNC: 16 U/L (ref 0–39)
BASOPHILS # BLD: 0.02 K/UL (ref 0–0.2)
BASOPHILS NFR BLD: 0 % (ref 0–2)
BILIRUB SERPL-MCNC: 0.4 MG/DL (ref 0–1.2)
BUN SERPL-MCNC: 24 MG/DL (ref 6–23)
CALCIUM SERPL-MCNC: 9.3 MG/DL (ref 8.6–10.2)
CHLORIDE SERPL-SCNC: 107 MMOL/L (ref 98–107)
CO2 SERPL-SCNC: 25 MMOL/L (ref 22–29)
CREAT SERPL-MCNC: 1.3 MG/DL (ref 0.7–1.2)
EOSINOPHIL # BLD: 0.19 K/UL (ref 0.05–0.5)
EOSINOPHILS RELATIVE PERCENT: 3 % (ref 0–6)
ERYTHROCYTE [DISTWIDTH] IN BLOOD BY AUTOMATED COUNT: 12.7 % (ref 11.5–15)
GFR SERPL CREATININE-BSD FRML MDRD: 54 ML/MIN/1.73M2
GLUCOSE SERPL-MCNC: 132 MG/DL (ref 74–99)
HCT VFR BLD AUTO: 40.3 % (ref 37–54)
HGB BLD-MCNC: 12.7 G/DL (ref 12.5–16.5)
IMM GRANULOCYTES # BLD AUTO: <0.03 K/UL (ref 0–0.58)
IMM GRANULOCYTES NFR BLD: 0 % (ref 0–5)
LYMPHOCYTES NFR BLD: 0.94 K/UL (ref 1.5–4)
LYMPHOCYTES RELATIVE PERCENT: 17 % (ref 20–42)
MCH RBC QN AUTO: 32.8 PG (ref 26–35)
MCHC RBC AUTO-ENTMCNC: 31.5 G/DL (ref 32–34.5)
MCV RBC AUTO: 104.1 FL (ref 80–99.9)
MONOCYTES NFR BLD: 0.6 K/UL (ref 0.1–0.95)
MONOCYTES NFR BLD: 11 % (ref 2–12)
NEUTROPHILS NFR BLD: 69 % (ref 43–80)
NEUTS SEG NFR BLD: 3.95 K/UL (ref 1.8–7.3)
PLATELET # BLD AUTO: 225 K/UL (ref 130–450)
PMV BLD AUTO: 10.8 FL (ref 7–12)
POTASSIUM SERPL-SCNC: 4.1 MMOL/L (ref 3.5–5)
PROT SERPL-MCNC: 6.5 G/DL (ref 6.4–8.3)
RBC # BLD AUTO: 3.87 M/UL (ref 3.8–5.8)
SODIUM SERPL-SCNC: 143 MMOL/L (ref 132–146)
WBC OTHER # BLD: 5.7 K/UL (ref 4.5–11.5)

## 2023-09-11 PROCEDURE — 6360000002 HC RX W HCPCS: Performed by: INTERNAL MEDICINE

## 2023-09-11 PROCEDURE — 99233 SBSQ HOSP IP/OBS HIGH 50: CPT | Performed by: INTERNAL MEDICINE

## 2023-09-11 PROCEDURE — 85025 COMPLETE CBC W/AUTO DIFF WBC: CPT

## 2023-09-11 PROCEDURE — 36415 COLL VENOUS BLD VENIPUNCTURE: CPT

## 2023-09-11 PROCEDURE — 6370000000 HC RX 637 (ALT 250 FOR IP)

## 2023-09-11 PROCEDURE — 80053 COMPREHEN METABOLIC PANEL: CPT

## 2023-09-11 PROCEDURE — 2580000003 HC RX 258: Performed by: INTERNAL MEDICINE

## 2023-09-11 PROCEDURE — 1200000000 HC SEMI PRIVATE

## 2023-09-11 PROCEDURE — 2580000003 HC RX 258

## 2023-09-11 PROCEDURE — 6370000000 HC RX 637 (ALT 250 FOR IP): Performed by: INTERNAL MEDICINE

## 2023-09-11 RX ORDER — HYDROCODONE BITARTRATE AND ACETAMINOPHEN 5; 325 MG/1; MG/1
1 TABLET ORAL EVERY 6 HOURS PRN
Status: DISCONTINUED | OUTPATIENT
Start: 2023-09-11 | End: 2023-09-13 | Stop reason: HOSPADM

## 2023-09-11 RX ADMIN — SACUBITRIL AND VALSARTAN 1 TABLET: 49; 51 TABLET, FILM COATED ORAL at 08:41

## 2023-09-11 RX ADMIN — BUMETANIDE 2 MG: 1 TABLET ORAL at 08:40

## 2023-09-11 RX ADMIN — HYDROCODONE BITARTRATE AND ACETAMINOPHEN 1 TABLET: 5; 325 TABLET ORAL at 21:30

## 2023-09-11 RX ADMIN — CETIRIZINE HYDROCHLORIDE 10 MG: 10 TABLET, FILM COATED ORAL at 08:41

## 2023-09-11 RX ADMIN — WATER 2000 MG: 1 INJECTION INTRAMUSCULAR; INTRAVENOUS; SUBCUTANEOUS at 19:50

## 2023-09-11 RX ADMIN — APIXABAN 5 MG: 5 TABLET, FILM COATED ORAL at 21:31

## 2023-09-11 RX ADMIN — WATER 2000 MG: 1 INJECTION INTRAMUSCULAR; INTRAVENOUS; SUBCUTANEOUS at 11:56

## 2023-09-11 RX ADMIN — SODIUM CHLORIDE, PRESERVATIVE FREE 10 ML: 5 INJECTION INTRAVENOUS at 08:42

## 2023-09-11 RX ADMIN — SODIUM CHLORIDE, PRESERVATIVE FREE 10 ML: 5 INJECTION INTRAVENOUS at 19:50

## 2023-09-11 RX ADMIN — METOPROLOL SUCCINATE 100 MG: 100 TABLET, EXTENDED RELEASE ORAL at 08:41

## 2023-09-11 ASSESSMENT — PAIN DESCRIPTION - LOCATION: LOCATION: FOOT

## 2023-09-11 ASSESSMENT — PAIN SCALES - GENERAL
PAINLEVEL_OUTOF10: 9
PAINLEVEL_OUTOF10: 0

## 2023-09-11 NOTE — PROGRESS NOTES
4 Eyes Skin Assessment     NAME:  Katrina De Los Santos  YOB: 1940  MEDICAL RECORD NUMBER:  [id number]    The patient is being assessed for  Admission    I agree that at least one RN has performed a thorough Head to Toe Skin Assessment on the patient. ALL assessment sites listed below have been assessed. Areas assessed by both nurses:    Head, Face, Ears, Shoulders, Back, Chest, Arms, Elbows, Hands, Sacrum. Buttock, Coccyx, Ischium, Legs. Feet and Heels, Under Medical Devices , and Other          Does the Patient have a Wound?  No noted wound(s)       Jayme Prevention initiated by RN: Yes  Wound Care Orders initiated by RN: No    Pressure Injury (Stage 3,4, Unstageable, DTI, NWPT, and Complex wounds) if present, place Wound referral order by RN under : No    New Ostomies, if present place, Ostomy referral order under : No     Nurse 1 eSignature: Electronically signed by Chad Loza on 9/11/23 at 12:03 AM EDT    **SHARE this note so that the co-signing nurse can place an eSignature**    Nurse 2 eSignature: Electronically signed by Soo Garay RN on 9/11/23 at 12:43 AM EDT

## 2023-09-11 NOTE — PROGRESS NOTES
This RN received report from off going nurse, Katelynn Leigh. During report, this N asked pt why he was sitting in his rollator walker and asked if he wanted to  try the chair out. Pt stated that he was not going to sit or lay down on an alarm and that the bed feels like a \"bag of lumpy potatoes\". This RN attempted to educate the patient on why we had bed and chair alarms on to prevent pt from falling, pt refusing both.  Pt signed bed and chair alarm waiver

## 2023-09-11 NOTE — PROGRESS NOTES
Pharmacy Consultation Note  (Antibiotic Dosing and Monitoring)    Initial consult date: 9-  Consulting physician/provider: Rosibel Leyva, APRN - CNP  Drug: Vancomycin  Indication: Skin and Soft Tissue Infection    Age/  Gender Height Weight IBW  Allergy Information   83 y.o./male 5' 11\" (180.3 cm)  252 lb (114.3 kg)    Ideal body weight: 75.3 kg (166 lb 0.1 oz)  Adjusted ideal body weight: 90.9 kg (200 lb 6.5 oz)   Coumadin [warfarin sodium] and Heparin      Renal Function:  Recent Labs     09/10/23  1013   BUN 29*   CREATININE 1.5*     No intake or output data in the 24 hours ending 09/10/23 2155    Vancomycin Monitoring:  Trough:  No results for input(s): \"VANCOTROUGH\" in the last 72 hours. Random:  No results for input(s): \"VANCORANDOM\" in the last 72 hours. No results for input(s): \"BLOODCULT2\" in the last 72 hours. Historical Cultures:  No results found for: \"ORG\"  No results for input(s): \"BC\" in the last 72 hours. Vancomycin Administration Times:  Recent vancomycin administrations                     vancomycin (VANCOCIN) 1,750 mg in sodium chloride 0.9 % 500 mL IVPB (mg) 1,750 mg New Bag 09/10/23 1246                    Assessment:  Patient is a 80 y.o. male who has been initiated on vancomycin  Estimated Creatinine Clearance: 48 mL/min (A) (based on SCr of 1.5 mg/dL (H)). To dose vancomycin, pharmacy will be utilizing [employer] calculation software for goal AUC/CHUCK 400-600 mg/L-hr (predicted AUC/CHUCK = 501, Tr =17.1)    Plan:   Will continue vancomycin 1000 mg IV every 18 hours  Will check vancomycin levels when appropriate  Will continue to monitor renal function   Pharmacy to follow    Niki Enrique, [facility] 9/10/2023 9:55 PM

## 2023-09-12 LAB
ALBUMIN SERPL-MCNC: 3.8 G/DL (ref 3.5–5.2)
ALP SERPL-CCNC: 82 U/L (ref 40–129)
ALT SERPL-CCNC: 11 U/L (ref 0–40)
ANION GAP SERPL CALCULATED.3IONS-SCNC: 9 MMOL/L (ref 7–16)
ASO AB SERPL-ACNC: <20 IU/ML (ref 0–200)
AST SERPL-CCNC: 21 U/L (ref 0–39)
BASOPHILS # BLD: 0.02 K/UL (ref 0–0.2)
BASOPHILS NFR BLD: 0 % (ref 0–2)
BILIRUB SERPL-MCNC: 0.4 MG/DL (ref 0–1.2)
BUN SERPL-MCNC: 26 MG/DL (ref 6–23)
CALCIUM SERPL-MCNC: 9.3 MG/DL (ref 8.6–10.2)
CHLORIDE SERPL-SCNC: 104 MMOL/L (ref 98–107)
CO2 SERPL-SCNC: 28 MMOL/L (ref 22–29)
CREAT SERPL-MCNC: 1.6 MG/DL (ref 0.7–1.2)
EOSINOPHIL # BLD: 0.25 K/UL (ref 0.05–0.5)
EOSINOPHILS RELATIVE PERCENT: 4 % (ref 0–6)
ERYTHROCYTE [DISTWIDTH] IN BLOOD BY AUTOMATED COUNT: 12.7 % (ref 11.5–15)
GFR SERPL CREATININE-BSD FRML MDRD: 44 ML/MIN/1.73M2
GLUCOSE SERPL-MCNC: 98 MG/DL (ref 74–99)
HCT VFR BLD AUTO: 40.8 % (ref 37–54)
HGB BLD-MCNC: 13.2 G/DL (ref 12.5–16.5)
IMM GRANULOCYTES # BLD AUTO: <0.03 K/UL (ref 0–0.58)
IMM GRANULOCYTES NFR BLD: 0 % (ref 0–5)
LYMPHOCYTES NFR BLD: 1.44 K/UL (ref 1.5–4)
LYMPHOCYTES RELATIVE PERCENT: 25 % (ref 20–42)
MCH RBC QN AUTO: 32.5 PG (ref 26–35)
MCHC RBC AUTO-ENTMCNC: 32.4 G/DL (ref 32–34.5)
MCV RBC AUTO: 100.5 FL (ref 80–99.9)
MONOCYTES NFR BLD: 0.59 K/UL (ref 0.1–0.95)
MONOCYTES NFR BLD: 10 % (ref 2–12)
NEUTROPHILS NFR BLD: 60 % (ref 43–80)
NEUTS SEG NFR BLD: 3.47 K/UL (ref 1.8–7.3)
PLATELET # BLD AUTO: 265 K/UL (ref 130–450)
PMV BLD AUTO: 11 FL (ref 7–12)
POTASSIUM SERPL-SCNC: 3.9 MMOL/L (ref 3.5–5)
PROCALCITONIN SERPL-MCNC: 0.06 NG/ML (ref 0–0.08)
PROT SERPL-MCNC: 7 G/DL (ref 6.4–8.3)
RBC # BLD AUTO: 4.06 M/UL (ref 3.8–5.8)
SODIUM SERPL-SCNC: 141 MMOL/L (ref 132–146)
WBC OTHER # BLD: 5.8 K/UL (ref 4.5–11.5)

## 2023-09-12 PROCEDURE — 80053 COMPREHEN METABOLIC PANEL: CPT

## 2023-09-12 PROCEDURE — 6360000002 HC RX W HCPCS: Performed by: INTERNAL MEDICINE

## 2023-09-12 PROCEDURE — 2580000003 HC RX 258

## 2023-09-12 PROCEDURE — 99233 SBSQ HOSP IP/OBS HIGH 50: CPT | Performed by: INTERNAL MEDICINE

## 2023-09-12 PROCEDURE — 86063 ANTISTREPTOLYSIN O SCREEN: CPT

## 2023-09-12 PROCEDURE — 2580000003 HC RX 258: Performed by: INTERNAL MEDICINE

## 2023-09-12 PROCEDURE — 6370000000 HC RX 637 (ALT 250 FOR IP): Performed by: INTERNAL MEDICINE

## 2023-09-12 PROCEDURE — 85025 COMPLETE CBC W/AUTO DIFF WBC: CPT

## 2023-09-12 PROCEDURE — 6370000000 HC RX 637 (ALT 250 FOR IP)

## 2023-09-12 PROCEDURE — 1200000000 HC SEMI PRIVATE

## 2023-09-12 PROCEDURE — 84145 PROCALCITONIN (PCT): CPT

## 2023-09-12 RX ADMIN — WATER 2000 MG: 1 INJECTION INTRAMUSCULAR; INTRAVENOUS; SUBCUTANEOUS at 21:24

## 2023-09-12 RX ADMIN — APIXABAN 5 MG: 5 TABLET, FILM COATED ORAL at 10:16

## 2023-09-12 RX ADMIN — WATER 2000 MG: 1 INJECTION INTRAMUSCULAR; INTRAVENOUS; SUBCUTANEOUS at 12:14

## 2023-09-12 RX ADMIN — SODIUM CHLORIDE, PRESERVATIVE FREE 10 ML: 5 INJECTION INTRAVENOUS at 10:17

## 2023-09-12 RX ADMIN — SODIUM CHLORIDE, PRESERVATIVE FREE 10 ML: 5 INJECTION INTRAVENOUS at 21:25

## 2023-09-12 RX ADMIN — APIXABAN 5 MG: 5 TABLET, FILM COATED ORAL at 21:24

## 2023-09-12 RX ADMIN — HYDROCODONE BITARTRATE AND ACETAMINOPHEN 1 TABLET: 5; 325 TABLET ORAL at 21:37

## 2023-09-12 RX ADMIN — EMPAGLIFLOZIN 10 MG: 10 TABLET, FILM COATED ORAL at 10:16

## 2023-09-12 RX ADMIN — CETIRIZINE HYDROCHLORIDE 10 MG: 10 TABLET, FILM COATED ORAL at 10:16

## 2023-09-12 RX ADMIN — SACUBITRIL AND VALSARTAN 1 TABLET: 49; 51 TABLET, FILM COATED ORAL at 10:16

## 2023-09-12 RX ADMIN — WATER 2000 MG: 1 INJECTION INTRAMUSCULAR; INTRAVENOUS; SUBCUTANEOUS at 02:36

## 2023-09-12 RX ADMIN — METOPROLOL SUCCINATE 100 MG: 100 TABLET, EXTENDED RELEASE ORAL at 10:16

## 2023-09-12 RX ADMIN — BUMETANIDE 2 MG: 1 TABLET ORAL at 10:15

## 2023-09-12 ASSESSMENT — PAIN SCALES - GENERAL
PAINLEVEL_OUTOF10: 0
PAINLEVEL_OUTOF10: 10

## 2023-09-12 ASSESSMENT — PAIN DESCRIPTION - LOCATION: LOCATION: LEG;FOOT

## 2023-09-12 ASSESSMENT — PAIN DESCRIPTION - ORIENTATION: ORIENTATION: LEFT

## 2023-09-12 ASSESSMENT — PAIN DESCRIPTION - DESCRIPTORS: DESCRIPTORS: THROBBING;STABBING

## 2023-09-12 NOTE — PLAN OF CARE
Patient's chart updated to reflect:      . - HF care plan, HF education points and HF discharge instructions.  -Orders: 2 gram sodium diet, daily weights, I/O.  -PCP and/or Cardiologist appointment to be scheduled within 7 days of hospital discharge.  -History of HF, not primary admission Dx. Patient admitted for treatment of cellulitis.      Chas Morales RN BSN  Heart Failure Navigator

## 2023-09-12 NOTE — DISCHARGE INSTRUCTIONS
HEART FAILURE  / CONGESTIVE HEART FAILURE  DISCHARGE INSTRUCTIONS:  GUIDELINES TO FOLLOW AT HOME    Self- Managed Care:     MEDICATIONS:  Take your medication as directed. If you are experiencing any side effects, inform your doctor, Do not stop taking any of your medications without letting your doctor know. Check with your doctor before taking any over-the-counter medications / herbal / or dietary supplements. They may interfere with your other medications. Do not take ibuprofen (Advil or Motrin) and naproxen (Aleve) without talking to your doctor first. They could make your heart failure worse. WEIGHT MONITORING:   Weigh yourself everyday (with the same scale) around the same time of the day and write it down. (you can chart them on a calendar or keep track of them on paper. Notify your doctor of a weight gain of 3 pounds or more in 1 day   OR a total of 5 pounds or more in 1 week    Take your weight record to your doctor visits  Also, the same goes if you loose more than 3# in one day, let your heart doctor know. DIET:   Cardiac heart healthy diet- Low saturated / low trans fat, no added salt, caffeine restricted, Low sodium diet-   No more than 2,000mg (2 grams) of salt / sodium per day (which equals to a little less than  a teaspoon of salt)  If your doctor wants you on a fluid restriction. ..it is usually recommended a fluid limit of 2,000cc -  Fluid restriction- 2,000 ml (milliliters) = 64 ounces = you can have 8 glasses of fluid per day (each glass 8 ounces)    Follow a low salt diet - avoid using salt at the table, avoid / limit use of canned soups, processed / packaged foods, salted snacks, olives and pickles. Do not use a salt substitute without checking with your doctor, they may contain a high amount of potassioum. (Mrs. Arreola Soulier is safe to use).     Limit the use of alcohol       CALL YOUR DOCTOR THE FIRST DAY YOU NOTICE ANY OF THESE   SYMPTOMS:  You have a

## 2023-09-12 NOTE — PLAN OF CARE
Problem: Pain  Goal: Verbalizes/displays adequate comfort level or baseline comfort level  Outcome: Progressing     Problem: Skin/Tissue Integrity  Goal: Absence of new skin breakdown  Description: 1. Monitor for areas of redness and/or skin breakdown  2. Assess vascular access sites hourly  3. Every 4-6 hours minimum:  Change oxygen saturation probe site  4. Every 4-6 hours:  If on nasal continuous positive airway pressure, respiratory therapy assess nares and determine need for appliance change or resting period.   Outcome: Progressing     Problem: Safety - Adult  Goal: Free from fall injury  Outcome: Progressing     Problem: Chronic Conditions and Co-morbidities  Goal: Patient's chronic conditions and co-morbidity symptoms are monitored and maintained or improved  Outcome: Progressing

## 2023-09-12 NOTE — CARE COORDINATION
Social Work discharge planning    Sw spoke to pt, who has PCP at [facility] Dr Eunice Chambers. Sw spoke to [facility] as listed as pt's PCP in [address] [place] [address]. They advised pt saw their NP June 2023, but Dr Andrei Truong retired in 2022. Pt has support from daughter Marcela Preciado. He drives. He has ww and cane, but was not using pta. Pt here for Right LE cellulitis. On IV Ancef q 8 hours here. Await Dr's advisement re: plan for ATB for discharge planning. Pt has recent hx with [facility], but NOT active now per naveed Hoang.    Electronically signed by Adrian Ball on 9/12/2023 at 3:38 PM

## 2023-09-13 VITALS
SYSTOLIC BLOOD PRESSURE: 105 MMHG | HEIGHT: 71 IN | RESPIRATION RATE: 16 BRPM | OXYGEN SATURATION: 97 % | DIASTOLIC BLOOD PRESSURE: 63 MMHG | HEART RATE: 75 BPM | BODY MASS INDEX: 36.54 KG/M2 | WEIGHT: 261 LBS | TEMPERATURE: 97.7 F

## 2023-09-13 LAB
ALBUMIN SERPL-MCNC: 3.8 G/DL (ref 3.5–5.2)
ALP SERPL-CCNC: 84 U/L (ref 40–129)
ALT SERPL-CCNC: 8 U/L (ref 0–40)
ANION GAP SERPL CALCULATED.3IONS-SCNC: 11 MMOL/L (ref 7–16)
AST SERPL-CCNC: 21 U/L (ref 0–39)
BASOPHILS # BLD: 0.02 K/UL (ref 0–0.2)
BASOPHILS NFR BLD: 0 % (ref 0–2)
BILIRUB SERPL-MCNC: 0.3 MG/DL (ref 0–1.2)
BUN SERPL-MCNC: 29 MG/DL (ref 6–23)
CALCIUM SERPL-MCNC: 9.3 MG/DL (ref 8.6–10.2)
CHLORIDE SERPL-SCNC: 104 MMOL/L (ref 98–107)
CO2 SERPL-SCNC: 28 MMOL/L (ref 22–29)
CREAT SERPL-MCNC: 1.5 MG/DL (ref 0.7–1.2)
EOSINOPHIL # BLD: 0.25 K/UL (ref 0.05–0.5)
EOSINOPHILS RELATIVE PERCENT: 4 % (ref 0–6)
ERYTHROCYTE [DISTWIDTH] IN BLOOD BY AUTOMATED COUNT: 12.8 % (ref 11.5–15)
GFR SERPL CREATININE-BSD FRML MDRD: 47 ML/MIN/1.73M2
GLUCOSE SERPL-MCNC: 103 MG/DL (ref 74–99)
HCT VFR BLD AUTO: 39.4 % (ref 37–54)
HGB BLD-MCNC: 12.7 G/DL (ref 12.5–16.5)
IMM GRANULOCYTES # BLD AUTO: 0.03 K/UL (ref 0–0.58)
IMM GRANULOCYTES NFR BLD: 0 % (ref 0–5)
LYMPHOCYTES NFR BLD: 1.44 K/UL (ref 1.5–4)
LYMPHOCYTES RELATIVE PERCENT: 21 % (ref 20–42)
MCH RBC QN AUTO: 32.4 PG (ref 26–35)
MCHC RBC AUTO-ENTMCNC: 32.2 G/DL (ref 32–34.5)
MCV RBC AUTO: 100.5 FL (ref 80–99.9)
MONOCYTES NFR BLD: 0.83 K/UL (ref 0.1–0.95)
MONOCYTES NFR BLD: 12 % (ref 2–12)
NEUTROPHILS NFR BLD: 63 % (ref 43–80)
NEUTS SEG NFR BLD: 4.43 K/UL (ref 1.8–7.3)
PLATELET # BLD AUTO: 255 K/UL (ref 130–450)
PMV BLD AUTO: 11 FL (ref 7–12)
POTASSIUM SERPL-SCNC: 4.1 MMOL/L (ref 3.5–5)
PROT SERPL-MCNC: 6.9 G/DL (ref 6.4–8.3)
RBC # BLD AUTO: 3.92 M/UL (ref 3.8–5.8)
SODIUM SERPL-SCNC: 143 MMOL/L (ref 132–146)
WBC OTHER # BLD: 7 K/UL (ref 4.5–11.5)

## 2023-09-13 PROCEDURE — 6370000000 HC RX 637 (ALT 250 FOR IP)

## 2023-09-13 PROCEDURE — 99239 HOSP IP/OBS DSCHRG MGMT >30: CPT | Performed by: INTERNAL MEDICINE

## 2023-09-13 PROCEDURE — 80053 COMPREHEN METABOLIC PANEL: CPT

## 2023-09-13 PROCEDURE — 2580000003 HC RX 258: Performed by: INTERNAL MEDICINE

## 2023-09-13 PROCEDURE — 6370000000 HC RX 637 (ALT 250 FOR IP): Performed by: INTERNAL MEDICINE

## 2023-09-13 PROCEDURE — 6360000002 HC RX W HCPCS: Performed by: INTERNAL MEDICINE

## 2023-09-13 PROCEDURE — 2580000003 HC RX 258

## 2023-09-13 PROCEDURE — 85025 COMPLETE CBC W/AUTO DIFF WBC: CPT

## 2023-09-13 RX ADMIN — SODIUM CHLORIDE, PRESERVATIVE FREE 10 ML: 5 INJECTION INTRAVENOUS at 08:28

## 2023-09-13 RX ADMIN — CETIRIZINE HYDROCHLORIDE 10 MG: 10 TABLET, FILM COATED ORAL at 08:28

## 2023-09-13 RX ADMIN — HYDROCODONE BITARTRATE AND ACETAMINOPHEN 1 TABLET: 5; 325 TABLET ORAL at 06:48

## 2023-09-13 RX ADMIN — METOPROLOL SUCCINATE 100 MG: 100 TABLET, EXTENDED RELEASE ORAL at 08:29

## 2023-09-13 RX ADMIN — WATER 2000 MG: 1 INJECTION INTRAMUSCULAR; INTRAVENOUS; SUBCUTANEOUS at 06:48

## 2023-09-13 RX ADMIN — SACUBITRIL AND VALSARTAN 1 TABLET: 49; 51 TABLET, FILM COATED ORAL at 08:29

## 2023-09-13 RX ADMIN — BUMETANIDE 2 MG: 1 TABLET ORAL at 08:29

## 2023-09-13 RX ADMIN — EMPAGLIFLOZIN 10 MG: 10 TABLET, FILM COATED ORAL at 08:28

## 2023-09-13 RX ADMIN — APIXABAN 2.5 MG: 2.5 TABLET, FILM COATED ORAL at 08:29

## 2023-09-13 RX ADMIN — HYDROCODONE BITARTRATE AND ACETAMINOPHEN 1 TABLET: 5; 325 TABLET ORAL at 12:33

## 2023-09-13 ASSESSMENT — PAIN SCALES - GENERAL
PAINLEVEL_OUTOF10: 10
PAINLEVEL_OUTOF10: 10
PAINLEVEL_OUTOF10: 4

## 2023-09-13 ASSESSMENT — PAIN DESCRIPTION - ORIENTATION
ORIENTATION: RIGHT
ORIENTATION: RIGHT

## 2023-09-13 ASSESSMENT — PAIN DESCRIPTION - LOCATION
LOCATION: LEG;FOOT
LOCATION: FOOT;LEG

## 2023-09-13 ASSESSMENT — PAIN DESCRIPTION - DESCRIPTORS
DESCRIPTORS: ACHING
DESCRIPTORS: THROBBING;SHOOTING

## 2023-09-13 ASSESSMENT — PAIN - FUNCTIONAL ASSESSMENT: PAIN_FUNCTIONAL_ASSESSMENT: ACTIVITIES ARE NOT PREVENTED

## 2023-09-13 NOTE — DISCHARGE SUMMARY
9/10/2023 10:49 am CLINICAL HISTORY: ORDERING SYSTEM PROVIDED HISTORY: pain swelling  TECHNOLOGIST PROVIDED HISTORY:  Reason for exam:->pain swelling TECHNIQUE: Frontal, lateral and oblique views of the right foot. COMPARISON: No relevant prior studies available. FINDINGS: Bones/joints:  No acute findings. No acute fracture. No dislocation. No significant arthritic changes noted. Soft tissues:  Diffuse soft tissue swelling of the ankle and foot. No radiopaque foreign body. Vasculature:  Vascular calcifications are noted. Diffuse soft tissue swelling of the ankle and foot. No evidence of acute fracture or traumatic malalignment.        Patient Instructions:      Medication List        START taking these medications      apixaban 2.5 MG Tabs tablet  Commonly known as: ELIQUIS  Take 1 tablet by mouth 2 times daily            CONTINUE taking these medications      bumetanide 2 MG tablet  Commonly known as: BUMEX  Take 1 tablet by mouth daily     cetirizine 10 MG tablet  Commonly known as: ZYRTEC     empagliflozin 10 MG tablet  Commonly known as: JARDIANCE  Take 1 tablet by mouth daily     metoprolol succinate 100 MG extended release tablet  Commonly known as: TOPROL XL  Take 1 tablet by mouth daily     sacubitril-valsartan 49-51 MG per tablet  Commonly known as: ENTRESTO  Take 1 tablet by mouth 2 times daily     vitamin B-12 1000 MCG tablet  Commonly known as: CYANOCOBALAMIN     vitamin C 500 MG tablet  Commonly known as: ASCORBIC ACID  Take 2 tablets by mouth daily for 10 days            STOP taking these medications      cephALEXin 500 MG capsule  Commonly known as: KEFLEX     doxycycline hyclate 100 MG tablet  Commonly known as: VIBRA-TABS     guaiFENesin 400 MG tablet     Xarelto 10 MG Tabs tablet  Generic drug: rivaroxaban               Where to Get Your Medications        These medications were sent to [address], [address] [place] [address],

## 2023-09-13 NOTE — PROGRESS NOTES
Your patient is on a medication that requires a renal and/or weight dose adjustment. Renal/Body Weight Function Assessment:    Date Body Weight IBW  Adjusted BW SCr  CrCl Dialysis status   9/13/2023 261 lb (118.4 kg)  Ideal body weight: 75.3 kg (166 lb 0.1 oz)  Adjusted ideal body weight: 92.5 kg (204 lb 0.1 oz) Serum creatinine: 1.5 mg/dL (H) 09/13/23 0600  Estimated creatinine clearance: 49 mL/min (A) N/A       Pharmacy has dose-adjusted the following medication(s):    Date Previous Order Adjusted Order   9/13/2023 Eliquis 5 mg BID Eliquis 2.5 mg BID       These changes were made per protocol according to the [facility] Renal Dosing Policy/ [facility] Pharmacist Anticoagulant Review. *Please note this dose may need readjusted if your patient's condition changes. Please contact pharmacy with any questions regarding these changes.     Dottie Juarez [facility]  9/13/2023  7:53 AM

## 2023-09-13 NOTE — PROGRESS NOTES
[facility] Quality Flow/Interdisciplinary Rounds Progress Note        Quality Flow Rounds held on September 13, 2023    Disciplines Attending:  Bedside Nurse, , , and Nursing Unit Leadership    Oneyda Valverde was admitted on 9/10/2023  8:23 PM    Anticipated Discharge Date:       Disposition:    Jayme Score:  Jayme Scale Score: 20    Readmission Risk              Risk of Unplanned Readmission:  22           Discussed patient goal for the day, patient clinical progression, and barriers to discharge.   The following Goal(s) of the Day/Commitment(s) have been identified:  Diagnostics - Report Results and Labs - Report Results      Satya Boo RN  September 13, 2023

## 2023-09-15 LAB
MICROORGANISM SPEC CULT: NORMAL
MICROORGANISM SPEC CULT: NORMAL
SERVICE CMNT-IMP: NORMAL
SERVICE CMNT-IMP: NORMAL
SPECIMEN DESCRIPTION: NORMAL
SPECIMEN DESCRIPTION: NORMAL

## 2024-08-16 NOTE — ACP (ADVANCE CARE PLANNING)
Advance Care Planning   Healthcare Decision Maker:    Primary Decision Maker: Luiza Raza - Child - [phone]    Click here to complete Healthcare Decision Makers including selection of the Healthcare Decision Maker Relationship (ie \"Primary\"). Refill approved as requested.